# Patient Record
Sex: FEMALE | Race: ASIAN | NOT HISPANIC OR LATINO | Employment: OTHER | URBAN - NONMETROPOLITAN AREA
[De-identification: names, ages, dates, MRNs, and addresses within clinical notes are randomized per-mention and may not be internally consistent; named-entity substitution may affect disease eponyms.]

---

## 2017-03-07 ENCOUNTER — TRANSCRIBE ORDERS (OUTPATIENT)
Dept: INTERNAL MEDICINE | Facility: CLINIC | Age: 67
End: 2017-03-07

## 2017-03-07 DIAGNOSIS — Z12.31 VISIT FOR SCREENING MAMMOGRAM: Primary | ICD-10-CM

## 2017-04-05 ENCOUNTER — HOSPITAL ENCOUNTER (OUTPATIENT)
Dept: MAMMOGRAPHY | Facility: HOSPITAL | Age: 67
Discharge: HOME | End: 2017-04-05
Attending: INTERNAL MEDICINE | Admitting: INTERNAL MEDICINE

## 2017-04-05 DIAGNOSIS — Z12.31 VISIT FOR SCREENING MAMMOGRAM: ICD-10-CM

## 2017-04-05 PROCEDURE — 77063 BREAST TOMOSYNTHESIS BI: CPT | Performed by: RADIOLOGY

## 2017-04-05 PROCEDURE — G0202 SCR MAMMO BI INCL CAD: HCPCS

## 2017-04-05 PROCEDURE — 77063 BREAST TOMOSYNTHESIS BI: CPT

## 2017-04-05 PROCEDURE — G0202 SCR MAMMO BI INCL CAD: HCPCS | Performed by: RADIOLOGY

## 2017-05-16 DIAGNOSIS — G50.0 TRIGEMINAL NEURALGIA: ICD-10-CM

## 2017-05-16 RX ORDER — AMITRIPTYLINE HYDROCHLORIDE 10 MG/1
TABLET, FILM COATED ORAL
Qty: 135 TABLET | Refills: 3 | Status: CANCELLED | OUTPATIENT
Start: 2017-05-16

## 2017-05-17 DIAGNOSIS — G50.0 TRIGEMINAL NEURALGIA: ICD-10-CM

## 2017-05-17 RX ORDER — AMITRIPTYLINE HYDROCHLORIDE 10 MG/1
TABLET, FILM COATED ORAL
Qty: 90 TABLET | Refills: 3 | Status: SHIPPED | OUTPATIENT
Start: 2017-05-17 | End: 2017-05-18 | Stop reason: SDUPTHER

## 2017-05-17 RX ORDER — GABAPENTIN 100 MG/1
100 CAPSULE ORAL 2 TIMES DAILY PRN
Qty: 180 CAPSULE | Refills: 3 | Status: SHIPPED | OUTPATIENT
Start: 2017-05-17 | End: 2017-05-18 | Stop reason: SDUPTHER

## 2017-05-18 DIAGNOSIS — G50.0 TRIGEMINAL NEURALGIA: ICD-10-CM

## 2017-05-18 RX ORDER — GABAPENTIN 100 MG/1
100 CAPSULE ORAL 2 TIMES DAILY PRN
Qty: 180 CAPSULE | Refills: 3 | Status: SHIPPED | OUTPATIENT
Start: 2017-05-18 | End: 2017-07-07 | Stop reason: SDUPTHER

## 2017-05-18 RX ORDER — AMITRIPTYLINE HYDROCHLORIDE 10 MG/1
TABLET, FILM COATED ORAL
Qty: 90 TABLET | Refills: 3 | Status: SHIPPED | OUTPATIENT
Start: 2017-05-18 | End: 2017-11-30 | Stop reason: SDUPTHER

## 2017-06-07 DIAGNOSIS — E78.5 HYPERLIPIDEMIA, UNSPECIFIED HYPERLIPIDEMIA TYPE: Primary | ICD-10-CM

## 2017-06-14 ENCOUNTER — RESULTS ENCOUNTER (OUTPATIENT)
Dept: INTERNAL MEDICINE | Facility: CLINIC | Age: 67
End: 2017-06-14

## 2017-06-14 DIAGNOSIS — E78.5 HYPERLIPIDEMIA, UNSPECIFIED HYPERLIPIDEMIA TYPE: ICD-10-CM

## 2017-06-20 RX ORDER — DOXYCYCLINE HYCLATE 50 MG/1
324 CAPSULE, GELATIN COATED ORAL
Qty: 90 TABLET | Refills: 3 | Status: SHIPPED | OUTPATIENT
Start: 2017-06-20 | End: 2017-07-07

## 2017-07-07 ENCOUNTER — OFFICE VISIT (OUTPATIENT)
Dept: NEUROLOGY | Facility: CLINIC | Age: 67
End: 2017-07-07

## 2017-07-07 VITALS
BODY MASS INDEX: 21.37 KG/M2 | SYSTOLIC BLOOD PRESSURE: 148 MMHG | HEART RATE: 88 BPM | HEIGHT: 59 IN | WEIGHT: 106 LBS | OXYGEN SATURATION: 98 % | DIASTOLIC BLOOD PRESSURE: 76 MMHG

## 2017-07-07 DIAGNOSIS — G50.0 TRIGEMINAL NEURALGIA OF RIGHT SIDE OF FACE: Primary | ICD-10-CM

## 2017-07-07 PROCEDURE — 99213 OFFICE O/P EST LOW 20 MIN: CPT | Performed by: PSYCHIATRY & NEUROLOGY

## 2017-07-07 RX ORDER — GABAPENTIN 100 MG/1
100 CAPSULE ORAL 2 TIMES DAILY PRN
Qty: 180 CAPSULE | Refills: 3 | Status: SHIPPED | OUTPATIENT
Start: 2017-07-07 | End: 2017-07-28 | Stop reason: SDUPTHER

## 2017-07-07 RX ORDER — CARBAMAZEPINE 200 MG/1
100 TABLET ORAL 3 TIMES DAILY
Qty: 90 TABLET | Refills: 3 | Status: SHIPPED | OUTPATIENT
Start: 2017-07-07 | End: 2018-11-13 | Stop reason: SDUPTHER

## 2017-07-09 NOTE — PROGRESS NOTES
[facility] NEUROLOGY [place] PROGRESS NOTE  History of Present Illness     Date: 7/8/2017    Patient Identification  Antonio Barnes is a 66 y.o. female.    Patient information was obtained from patient and relative(s).  History/Exam limitations: none.    Original consultation requested by: Amarjit Beck MD      Chief Complaint   Trigeminal Neuralgia (Pt here for follow up, pt states she has been having a lot of pain )      History of Present Illness   Is a delightful 66-year-old lady who had been diagnosis of trigeminal neuralgia patient is status post gamma knife resection clinically improved 4.  Time and recently patient is having facial numbness associated with intermittent radiating pain along her maxillary and ophthalmic branch of trigeminal nerve.    PMH:   Past Medical History:   Diagnosis Date   • Anemia    • Breast cancer 1994    RIGHT   • Esophagitis, reflux    • High cholesterol    • History of colonoscopy 2010    (Fiberoptic)   • History of mammogram    • History of Papanicolaou smear of cervix     History of reported pap smear   • Osteoarthritis    • Peptic ulcer    • Trigeminal neuralgia    • Urethral stricture due to infection        Past Surgical History:   Past Surgical History:   Procedure Laterality Date   • APPENDECTOMY     • BREAST BIOPSY Right 1994   • COLONOSCOPY      Colonoscopy (Fiberoptic)   • MASTECTOMY Right 1994   • TONSILLECTOMY     • TONSILLECTOMY         Family Hisotry:   Family History   Problem Relation Age of Onset   • Hypertension Mother    • Kidney disease Mother    • Heart attack Father    • Breast cancer Maternal Aunt      AGE UNKNOWN   • Ovarian cancer Neg Hx        Social History:   Social History     Social History   • Marital status:      Spouse name: N/A   • Number of children: N/A   • Years of education: N/A     Occupational History   • Not on file.     Social History Main Topics   • Smoking status: Former Smoker     Packs/day: 1.00     Years: 20.00     Types:  Cigarettes     Quit date: 2007   • Smokeless tobacco: Never Used      Comment: Quit smoking July 2007   • Alcohol use No   • Drug use: No   • Sexual activity: No     Other Topics Concern   • Not on file     Social History Narrative       Medications:   Current Outpatient Prescriptions   Medication Sig Dispense Refill   • amitriptyline (ELAVIL) 10 MG tablet Take 1.5 tablet daily at dinner. 90 tablet 3   • amLODIPine (NORVASC) 10 MG tablet TAKE 1 TABLET DAILY AS     DIRECTED 90 tablet 3   • B Complex Vitamins (VITAMIN B COMPLEX) tablet Take 1 tablet by mouth daily.     • calcium-vitamin D (OSCAL-500) 500-200 MG-UNIT per tablet Take 1 tablet by mouth 2 (two) times a day.     • fenofibrate micronized (LOFIBRA) 134 MG capsule Take 1 capsule by mouth Every Other Day. 45 capsule 3   • gabapentin (NEURONTIN) 100 MG capsule Take 1 capsule by mouth 2 (Two) Times a Day As Needed (for facial pain). Take 1 capsule at lunch and 1 capsule at bedtime. 180 capsule 3   • Omega-3 Fatty Acids (FISH OIL PO) 1000 mg twice daily.     • omeprazole (PriLOSEC) 20 MG capsule TAKE 1 CAPSULE DAILY. 90 capsule 3   • pravastatin (PRAVACHOL) 80 MG tablet TAKE 1 TABLET DAILY 90 tablet 3   • sertraline (ZOLOFT) 50 MG tablet TAKE 1 TABLET EVERY DAY 90 tablet 3   • alendronate (FOSAMAX) 70 MG tablet Take 1 tablet by mouth 1 (one) time per week.     • carBAMazepine (TEGRETOL) 200 MG tablet Take 0.5 tablets by mouth 3 (Three) Times a Day. 90 tablet 3   • Cholecalciferol (VITAMIN D3) 1000 UNITS capsule Take 1 capsule by mouth daily.       No current facility-administered medications for this visit.        Allergy:   Allergies   Allergen Reactions   • Ace Inhibitors Cough   • Penicillins        Review of Systems:  Review of Systems   Constitutional: Negative for chills and fever.   HENT: Negative for congestion, ear pain, hearing loss, rhinorrhea and sore throat.    Eyes: Negative for pain, discharge and redness.   Respiratory: Negative for cough,  "shortness of breath, wheezing and stridor.    Cardiovascular: Negative for chest pain, palpitations and leg swelling.   Gastrointestinal: Negative for abdominal pain, constipation, nausea and vomiting.   Endocrine: Negative for cold intolerance, heat intolerance and polyphagia.   Genitourinary: Negative for dysuria, flank pain, frequency and urgency.   Musculoskeletal: Negative for joint swelling, myalgias, neck pain and neck stiffness.   Skin: Negative for pallor, rash and wound.   Allergic/Immunologic: Negative for environmental allergies.   Neurological: Positive for numbness. Negative for dizziness, tremors, seizures, syncope, facial asymmetry, speech difficulty, weakness, light-headedness and headaches.   Hematological: Negative for adenopathy.   Psychiatric/Behavioral: Positive for sleep disturbance. Negative for confusion and hallucinations. The patient is not nervous/anxious.        Physical Exam     Vitals:    07/07/17 1532   BP: 148/76   Pulse: 88   SpO2: 98%   Weight: 106 lb (48.1 kg)   Height: 59\" (149.9 cm)     GENERAL: Patient is pleasant, cooperative, appears to be stated age.  Body habitus is endomorphic.  SKIN AND EXTREMITIES:  No skin rashes or lesions are noted.  No cyanosis, clubbing or edema of the extremities.    HEAD:  Head is normocephalic and atraumatic.    NECK: Neck are non-tender without thyromegaly or adenopathy.  Carotic upstrokes are 1+/4.  No cranial or cervical bruits.  The neck is supple with a full range of motion.   ENT: palate elevate symmetrically, no evidence of high arch palate, tongue midline erythema in posterior pharynx, Mallampati Classification Class III   CARDIOVASCULAR:  Regular rate and rhythm with normal S1 and S2 without rub or gallop.  RESPIRATORY:  Clear to auscultation without wheezes or crackle   ABDOMEN:  Soft and non-tender, positive bowel sound without hepatosplenomegaly  BACK:  Back is straight without midline defect.    PSYCH:  Higher cortical " function/mental status:  The patient is alert.  She is oriented x3 to time, place and person.  Recent and the remote memory appear normal.  The patient has a good fund of knowledge.  There is no visual or auditory hallucination or suicidal or homicidal ideation.  SPEECH:There is no gross evidence of aphasia, dysarthria or agnosia.      CRANIAL NERVES:  Pupils are 4mm, equal round reactive to light, reacting briskly to 2mm without afferent pupillary defect.  Visual fields are intact to confrontation testing.  Fundoscopic examination reveals sharp disk margins with normal vasculature.  No papilledema, hemorrhages or exudates.  Extraocular movements are full and smooth with normal pursuits and saccades.  No nystagmus noted.  The face is symmetric. palate elevate symmetrically, Tongue midline, positive gag reflex. The remainder of the cranial nerves are intact and symmetrical.    MOTOR: Strength is 5/5 throughout with normal tone and bulk with the following exceptions, 4/5 intrinsic muscles of the hands and feet.  No involuntary movements noted.    Deep Tendon Reflexes: are 2/4 and symmetrical in the upper extremities, 2/4 and symmetrical at the knees and 1/4 and symmetrical at the Achilles tendon.  Plantar responses were down-going bilaterally.    SENSATION:  Intact to pinprick, light touch, vibration and proprioception.  Coordination:  The patient normally performs finger-nose-finger, heel-to-knee-to-shin and rapid alternating movements in symmetrical fashion.    COORDINATION AND GAIT:  The patient walks with a narrow-based gait.  Patient is able to heel-toe and tandem walk forward and backwards without difficulty.  Romberg and monopedal  Romberg are negative.    MUSCULOSKELETAL: Range of motion normal, no clubbing, cyanosis, or edema.  No joint swelling.              Records Reviewed: I have personally reviewed her previous medical record.    Antonio was seen today for trigeminal neuralgia.    Diagnoses and all  orders for this visit:    Trigeminal neuralgia of right side of face    Other orders  -     gabapentin (NEURONTIN) 100 MG capsule; Take 1 capsule by mouth 2 (Two) Times a Day As Needed (for facial pain). Take 1 capsule at lunch and 1 capsule at bedtime.  -     carBAMazepine (TEGRETOL) 200 MG tablet; Take 0.5 tablets by mouth 3 (Three) Times a Day.      Treatments:  1. I will start this patient on Tegretol 200 mg half a pill 3 times a day when necessary  2.  I have refilled her gabapentin  3.  And I'll schedule patient for follow-up visit    This Document is signed by Deangelo Rosas MD, FAAN, FAASM   July 8, 201710:18 PM

## 2017-08-03 ENCOUNTER — APPOINTMENT (OUTPATIENT)
Dept: LAB | Facility: HOSPITAL | Age: 67
End: 2017-08-03

## 2017-08-03 LAB
ALBUMIN SERPL-MCNC: 4.6 G/DL (ref 3.2–4.8)
ALBUMIN/GLOB SERPL: 1.4 G/DL (ref 1.5–2.5)
ALP SERPL-CCNC: 54 U/L (ref 25–100)
ALT SERPL W P-5'-P-CCNC: 24 U/L (ref 7–40)
ANION GAP SERPL CALCULATED.3IONS-SCNC: 10 MMOL/L (ref 3–11)
ARTICHOKE IGE QN: 147 MG/DL (ref 0–130)
AST SERPL-CCNC: 30 U/L (ref 0–33)
BILIRUB SERPL-MCNC: 0.3 MG/DL (ref 0.3–1.2)
BUN BLD-MCNC: 16 MG/DL (ref 9–23)
BUN/CREAT SERPL: 20 (ref 7–25)
CALCIUM SPEC-SCNC: 9.8 MG/DL (ref 8.7–10.4)
CHLORIDE SERPL-SCNC: 103 MMOL/L (ref 99–109)
CHOLEST SERPL-MCNC: 218 MG/DL (ref 0–200)
CK SERPL-CCNC: 130 U/L (ref 26–174)
CO2 SERPL-SCNC: 27 MMOL/L (ref 20–31)
CREAT BLD-MCNC: 0.8 MG/DL (ref 0.6–1.3)
GFR SERPL CREATININE-BSD FRML MDRD: 72 ML/MIN/1.73
GFR SERPL CREATININE-BSD FRML MDRD: 87 ML/MIN/1.73
GLOBULIN UR ELPH-MCNC: 3.3 GM/DL
GLUCOSE BLD-MCNC: 97 MG/DL (ref 70–100)
HDLC SERPL-MCNC: 65 MG/DL (ref 40–60)
POTASSIUM BLD-SCNC: 4.1 MMOL/L (ref 3.5–5.5)
PROT SERPL-MCNC: 7.9 G/DL (ref 5.7–8.2)
SODIUM BLD-SCNC: 140 MMOL/L (ref 132–146)
TRIGL SERPL-MCNC: 84 MG/DL (ref 0–150)

## 2017-08-03 PROCEDURE — 80061 LIPID PANEL: CPT | Performed by: INTERNAL MEDICINE

## 2017-08-03 PROCEDURE — 36415 COLL VENOUS BLD VENIPUNCTURE: CPT | Performed by: INTERNAL MEDICINE

## 2017-08-03 PROCEDURE — 82550 ASSAY OF CK (CPK): CPT | Performed by: INTERNAL MEDICINE

## 2017-08-03 PROCEDURE — 80053 COMPREHEN METABOLIC PANEL: CPT | Performed by: INTERNAL MEDICINE

## 2017-08-03 RX ORDER — GABAPENTIN 100 MG/1
CAPSULE ORAL
Qty: 180 CAPSULE | Refills: 1 | Status: SHIPPED | OUTPATIENT
Start: 2017-08-03 | End: 2017-08-22 | Stop reason: DRUGHIGH

## 2017-08-09 ENCOUNTER — OFFICE VISIT (OUTPATIENT)
Dept: INTERNAL MEDICINE | Facility: CLINIC | Age: 67
End: 2017-08-09

## 2017-08-09 VITALS
BODY MASS INDEX: 21.57 KG/M2 | TEMPERATURE: 99 F | RESPIRATION RATE: 14 BRPM | WEIGHT: 107 LBS | HEART RATE: 94 BPM | OXYGEN SATURATION: 98 % | DIASTOLIC BLOOD PRESSURE: 80 MMHG | HEIGHT: 59 IN | SYSTOLIC BLOOD PRESSURE: 152 MMHG

## 2017-08-09 DIAGNOSIS — K21.9 GASTROESOPHAGEAL REFLUX DISEASE WITHOUT ESOPHAGITIS: ICD-10-CM

## 2017-08-09 DIAGNOSIS — D64.9 ANEMIA, UNSPECIFIED TYPE: ICD-10-CM

## 2017-08-09 DIAGNOSIS — I10 ESSENTIAL HYPERTENSION: Primary | ICD-10-CM

## 2017-08-09 DIAGNOSIS — G50.0 TRIGEMINAL NEURALGIA: ICD-10-CM

## 2017-08-09 DIAGNOSIS — E55.9 VITAMIN D DEFICIENCY: ICD-10-CM

## 2017-08-09 DIAGNOSIS — F32.A DEPRESSION, UNSPECIFIED DEPRESSION TYPE: ICD-10-CM

## 2017-08-09 DIAGNOSIS — M77.10 LATERAL EPICONDYLITIS, UNSPECIFIED LATERALITY: ICD-10-CM

## 2017-08-09 PROCEDURE — 99214 OFFICE O/P EST MOD 30 MIN: CPT | Performed by: INTERNAL MEDICINE

## 2017-08-09 NOTE — PROGRESS NOTES
Subjective   Antonio Barnes is a 66 y.o. female.     Chief Complaint   Patient presents with   • Follow-up   • Jaw Pain   • Skin Problem     look at feet    • Labs Only       History of Present Illness   Patient here for follow-up.  Blood pressure elevated on medication.  Vision complains trigeminal neuralgia is coming back.  Patient is taking pain medicine by neurologist but still has severe pain.  Hyperlipidemia  patient is taking pravastatin 80 mg nighttime.  Also complains skin lesion in both ankle and feet no significant changing size or color.  GERD stable medication.  Knee joint pain stable now.  Anemia stable now.    Current Outpatient Prescriptions:   •  alendronate (FOSAMAX) 70 MG tablet, Take 1 tablet by mouth 1 (one) time per week., Disp: , Rfl:   •  amitriptyline (ELAVIL) 10 MG tablet, Take 1.5 tablet daily at dinner., Disp: 90 tablet, Rfl: 3  •  amLODIPine (NORVASC) 10 MG tablet, TAKE 1 TABLET DAILY AS     DIRECTED, Disp: 90 tablet, Rfl: 3  •  B Complex Vitamins (VITAMIN B COMPLEX) tablet, Take 1 tablet by mouth daily., Disp: , Rfl:   •  calcium-vitamin D (OSCAL-500) 500-200 MG-UNIT per tablet, Take 1 tablet by mouth 2 (two) times a day., Disp: , Rfl:   •  carBAMazepine (TEGRETOL) 200 MG tablet, Take 0.5 tablets by mouth 3 (Three) Times a Day., Disp: 90 tablet, Rfl: 3  •  Cholecalciferol (VITAMIN D3) 1000 UNITS capsule, Take 1 capsule by mouth daily., Disp: , Rfl:   •  fenofibrate micronized (LOFIBRA) 134 MG capsule, Take 1 capsule by mouth Every Other Day., Disp: 45 capsule, Rfl: 3  •  gabapentin (NEURONTIN) 100 MG capsule, TAKE 1 CAPSULE AT LUNCH AND 1 CAPSULE AT BEDTIME AS NEEDED FOR FACIAL PAIN, Disp: 180 capsule, Rfl: 1  •  Omega-3 Fatty Acids (FISH OIL PO), 1000 mg twice daily., Disp: , Rfl:   •  omeprazole (PriLOSEC) 20 MG capsule, TAKE 1 CAPSULE DAILY., Disp: 90 capsule, Rfl: 3  •  pravastatin (PRAVACHOL) 80 MG tablet, TAKE 1 TABLET DAILY, Disp: 90 tablet, Rfl: 3  •  sertraline (ZOLOFT)  50 MG tablet, TAKE 1 TABLET EVERY DAY, Disp: 90 tablet, Rfl: 3    The following portions of the patient's history were reviewed and updated as appropriate: allergies, current medications, past family history, past medical history, past social history, past surgical history and problem list.    Review of Systems   Constitutional: Negative.    Respiratory: Negative.    Cardiovascular: Negative.    Gastrointestinal: Negative.    Musculoskeletal: Negative.    Skin: Negative.    Neurological: Negative.         Trigeminal neuralgia   Psychiatric/Behavioral: Negative.        Objective   Physical Exam   Constitutional: She is oriented to person, place, and time. She appears well-nourished.   Neck: Neck supple.   Cardiovascular: Normal rate, regular rhythm and normal heart sounds.    Pulmonary/Chest: Effort normal and breath sounds normal.   Abdominal: Bowel sounds are normal.   Neurological: She is alert and oriented to person, place, and time.   Trigeminal neuralgia   Skin: Skin is warm.   Psychiatric: She has a normal mood and affect.       All tests have been reviewed.    Assessment/Plan   There are no diagnoses linked to this encounter.           constipation improved after metamucil   Low back pain improved. cotninue Flexeril and the Tylenol patient declines physical therapy  Neck pain improved XR mild arthritis Flexeril and Tylenol patient declines a physical therapy  Osteoporosis,improved after Fosamax 70 mg once a week. continue Fosamax. Os-Tate D 500 mg twice a day. Weightbearing exercise.   Vitamin D deficiency continue vitamin D supplements  Thalassemia trait continue to watch, follow with hematologist  anemia now repeat Hb 10.5, s/p colonoscopy, 3/2015. need EGD and SBFT , patient wants to discuss next  visit  Hyperlipidemia continue good diet and fenofibrate 134mg,   GERD continue medicine  Depression continue sertraline   HTN continue medicine, BP high ?pain related, control pain first  Left knee OA XR mini DJD  PT patient declined  history breast ca right mastectomy 1994  Trigeminal neuralgia follow up with neuro,   gyn done PA no more needed  Colon 3/2016, only divertic and hemorroid  [id number], prevnar 13 done, zostavax done, Tdap done, pneumovax done  SK watch for color and size changes  PE done with octavio  follow up 4 mo

## 2017-08-17 ENCOUNTER — TELEPHONE (OUTPATIENT)
Dept: NEUROLOGY | Facility: CLINIC | Age: 67
End: 2017-08-17

## 2017-08-17 NOTE — TELEPHONE ENCOUNTER
Patient's daughter ( Jocelin) called and states that the patient's right side of her face isn't getting any better. She is in so much pain that she is crying and can't function. They would like to possibly talk about an increase in the medicine Carbamazepine. States that the patient's legs and feet are doing okay.

## 2017-08-22 NOTE — TELEPHONE ENCOUNTER
Pt's daughter called again about her mother's facial pain. They would like to see about increasing her Gabapentin.     I spoke with Dr. Rosas he has approved and increase. Pt may take Gabapentin 300mg, one BID. #60 with 2 RF called in to Christy @ [facility].

## 2017-09-15 ENCOUNTER — OFFICE VISIT (OUTPATIENT)
Dept: NEUROLOGY | Facility: CLINIC | Age: 67
End: 2017-09-15

## 2017-09-15 VITALS
DIASTOLIC BLOOD PRESSURE: 78 MMHG | BODY MASS INDEX: 21.57 KG/M2 | WEIGHT: 107 LBS | HEIGHT: 59 IN | SYSTOLIC BLOOD PRESSURE: 152 MMHG | HEART RATE: 89 BPM | OXYGEN SATURATION: 98 %

## 2017-09-15 DIAGNOSIS — G50.0 TRIGEMINAL NEURALGIA: Primary | ICD-10-CM

## 2017-09-15 PROCEDURE — 99213 OFFICE O/P EST LOW 20 MIN: CPT | Performed by: PSYCHIATRY & NEUROLOGY

## 2017-09-15 RX ORDER — GABAPENTIN 300 MG/1
CAPSULE ORAL
COMMUNITY
Start: 2017-08-22 | End: 2018-11-13 | Stop reason: SDUPTHER

## 2017-09-15 NOTE — PROGRESS NOTES
[facility] NEUROLOGY [place] PROGRESS NOTE  History of Present Illness     Date: 9/15/2017    Patient Identification  Antonio Barnes is a 66 y.o. female.    Patient information was obtained from patient and relative(s).  History/Exam limitations: none.    Original consultation requested by: Amarjit Beck MD      Chief Complaint   Pain (Pt in office with c/o pain in head )      History of Present Illness   Patient is a pleasant 66-year-old referred to [facility] neurology [place] for evaluation of trigeminal neuralgia.  Interval history since last visit patient has an acute is has sedation of trigeminal neuralgia require Tegretol 100 mg 3 times a day to abort her persistent trigeminal neuralgia attacks.  Patient also increase her Neurontin from 100 mg to 300 mg 3 times a day during the attacks.      In today visit patient reported that her trigeminal neuralgia is under excellent control.  She has reduced her Neurontin back to 100 mg 3 times a day.  She has also reduce her Tegretol use to when necessary basis.    PMH:   Past Medical History:   Diagnosis Date   • Anemia    • Breast cancer 1994    RIGHT   • Esophagitis, reflux    • High cholesterol    • History of colonoscopy 2010    (Fiberoptic)   • History of mammogram    • History of Papanicolaou smear of cervix     History of reported pap smear   • Osteoarthritis    • Peptic ulcer    • Trigeminal neuralgia    • Urethral stricture due to infection        Past Surgical History:   Past Surgical History:   Procedure Laterality Date   • APPENDECTOMY     • BREAST BIOPSY Right 1994   • COLONOSCOPY      Colonoscopy (Fiberoptic)   • MASTECTOMY Right 1994   • TONSILLECTOMY     • TONSILLECTOMY         Family Hisotry:   Family History   Problem Relation Age of Onset   • Hypertension Mother    • Kidney disease Mother    • Heart attack Father    • Breast cancer Maternal Aunt      AGE UNKNOWN   • Ovarian cancer Neg Hx        Social History:   Social History     Social  History   • Marital status:      Spouse name: N/A   • Number of children: N/A   • Years of education: N/A     Occupational History   • Not on file.     Social History Main Topics   • Smoking status: Former Smoker     Packs/day: 1.00     Years: 20.00     Types: Cigarettes     Quit date: 2007   • Smokeless tobacco: Never Used      Comment: Quit smoking July 2007   • Alcohol use No   • Drug use: No   • Sexual activity: No     Other Topics Concern   • Not on file     Social History Narrative       Medications:   Current Outpatient Prescriptions   Medication Sig Dispense Refill   • amitriptyline (ELAVIL) 10 MG tablet Take 1.5 tablet daily at dinner. 90 tablet 3   • amLODIPine (NORVASC) 10 MG tablet TAKE 1 TABLET DAILY AS     DIRECTED 90 tablet 3   • B Complex Vitamins (VITAMIN B COMPLEX) tablet Take 1 tablet by mouth daily.     • calcium-vitamin D (OSCAL-500) 500-200 MG-UNIT per tablet Take 1 tablet by mouth 2 (two) times a day.     • carBAMazepine (TEGRETOL) 200 MG tablet Take 0.5 tablets by mouth 3 (Three) Times a Day. 90 tablet 3   • Cholecalciferol (VITAMIN D3) 1000 UNITS capsule Take 1 capsule by mouth daily.     • fenofibrate micronized (LOFIBRA) 134 MG capsule Take 1 capsule by mouth Every Other Day. 45 capsule 3   • gabapentin (NEURONTIN) 300 MG capsule      • Omega-3 Fatty Acids (FISH OIL PO) 1000 mg twice daily.     • omeprazole (PriLOSEC) 20 MG capsule TAKE 1 CAPSULE DAILY. 90 capsule 3   • pravastatin (PRAVACHOL) 80 MG tablet TAKE 1 TABLET DAILY 90 tablet 3   • sertraline (ZOLOFT) 50 MG tablet TAKE 1 TABLET EVERY DAY 90 tablet 3   • alendronate (FOSAMAX) 70 MG tablet Take 1 tablet by mouth 1 (one) time per week.       No current facility-administered medications for this visit.        Allergy:   Allergies   Allergen Reactions   • Ace Inhibitors Cough   • Penicillins        Review of Systems:  Review of Systems   Constitutional: Negative for chills and fever.   HENT: Negative for congestion, ear pain,  "hearing loss, rhinorrhea and sore throat.    Eyes: Negative for pain, discharge and redness.   Respiratory: Negative for cough, shortness of breath, wheezing and stridor.    Cardiovascular: Negative for chest pain, palpitations and leg swelling.   Gastrointestinal: Negative for abdominal pain, constipation, nausea and vomiting.   Endocrine: Negative for cold intolerance, heat intolerance and polyphagia.   Genitourinary: Negative for dysuria, flank pain, frequency and urgency.   Musculoskeletal: Negative for joint swelling, myalgias, neck pain and neck stiffness.   Skin: Negative for pallor, rash and wound.   Allergic/Immunologic: Negative for environmental allergies.   Neurological: Positive for headaches. Negative for dizziness, tremors, seizures, syncope, facial asymmetry, speech difficulty, weakness, light-headedness and numbness.   Hematological: Negative for adenopathy.   Psychiatric/Behavioral: Negative for confusion and hallucinations. The patient is not nervous/anxious.        Physical Exam     Vitals:    09/15/17 1627   BP: 152/78   Pulse: 89   SpO2: 98%   Weight: 107 lb (48.5 kg)   Height: 59\" (149.9 cm)     GENERAL: Patient is pleasant, cooperative, appears to be stated age.  Body habitus is endomorphic.  SKIN AND EXTREMITIES:  No skin rashes or lesions are noted.  No cyanosis, clubbing or edema of the extremities.    HEAD:  Head is normocephalic and atraumatic.    NECK: Neck are non-tender without thyromegaly or adenopathy.  Carotic upstrokes are 1+/4.  No cranial or cervical bruits.  The neck is supple with a full range of motion.   ENT: palate elevate symmetrically, no evidence of high arch palate, tongue midline erythema in posterior pharynx, Mallampati Classification Class III   CARDIOVASCULAR:  Regular rate and rhythm with normal S1 and S2 without rub or gallop.  RESPIRATORY:  Clear to auscultation without wheezes or crackle   ABDOMEN:  Soft and non-tender, positive bowel sound without " hepatosplenomegaly  BACK:  Back is straight without midline defect.    PSYCH:  Higher cortical function/mental status:  The patient is alert.  She is oriented x3 to time, place and person.  Recent and the remote memory appear normal.  The patient has a good fund of knowledge.  There is no visual or auditory hallucination or suicidal or homicidal ideation.  SPEECH:There is no gross evidence of aphasia, dysarthria or agnosia.      CRANIAL NERVES:  Pupils are 4mm, equal round reactive to light, reacting briskly to 2mm without afferent pupillary defect.  Visual fields are intact to confrontation testing.  Fundoscopic examination reveals sharp disk margins with normal vasculature.  No papilledema, hemorrhages or exudates.  Extraocular movements are full and smooth with normal pursuits and saccades.  No nystagmus noted.  The face is symmetric. palate elevate symmetrically, Tongue midline, positive gag reflex. The remainder of the cranial nerves are intact and symmetrical.    MOTOR: Strength is 5/5 throughout with normal tone and bulk with the following exceptions, 4/5 intrinsic muscles of the hands and feet.  No involuntary movements noted.    Deep Tendon Reflexes: are 2/4 and symmetrical in the upper extremities, 2/4 and symmetrical at the knees and 1/4 and symmetrical at the Achilles tendon.  Plantar responses were down-going bilaterally.    SENSATION:  Intact to pinprick, light touch, vibration and proprioception.  Coordination:  The patient normally performs finger-nose-finger, heel-to-knee-to-shin and rapid alternating movements in symmetrical fashion.    COORDINATION AND GAIT:  The patient walks with a narrow-based gait.  Patient is able to heel-toe and tandem walk forward and backwards without difficulty.  Romberg and monopedal  Romberg are negative.    MUSCULOSKELETAL: Range of motion normal, no clubbing, cyanosis, or edema.  No joint swelling.            Studies: I have personally reviewed the following and  discussed with the patient.  Results for orders placed or performed in visit on 06/14/17   CK   Result Value Ref Range    Creatine Kinase 130 26 - 174 U/L   Comprehensive Metabolic Panel   Result Value Ref Range    Glucose 97 70 - 100 mg/dL    BUN 16 9 - 23 mg/dL    Creatinine 0.80 0.60 - 1.30 mg/dL    Sodium 140 132 - 146 mmol/L    Potassium 4.1 3.5 - 5.5 mmol/L    Chloride 103 99 - 109 mmol/L    CO2 27.0 20.0 - 31.0 mmol/L    Calcium 9.8 8.7 - 10.4 mg/dL    Total Protein 7.9 5.7 - 8.2 g/dL    Albumin 4.60 3.20 - 4.80 g/dL    ALT (SGPT) 24 7 - 40 U/L    AST (SGOT) 30 0 - 33 U/L    Alkaline Phosphatase 54 25 - 100 U/L    Total Bilirubin 0.3 0.3 - 1.2 mg/dL    eGFR Non African Amer 72 >60 mL/min/1.73    eGFR  African Amer 87 >60 mL/min/1.73    Globulin 3.3 gm/dL    A/G Ratio 1.4 (L) 1.5 - 2.5 g/dL    BUN/Creatinine Ratio 20.0 7.0 - 25.0    Anion Gap 10.0 3.0 - 11.0 mmol/L   Lipid Panel   Result Value Ref Range    Total Cholesterol 218 (H) 0 - 200 mg/dL    Triglycerides 84 0 - 150 mg/dL    HDL Cholesterol 65 (H) 40 - 60 mg/dL    LDL Cholesterol  147 (H) 0 - 130 mg/dL     Records Reviewed: I have personally reviewed her previous medical record.    Antonio was seen today for pain.    Diagnoses and all orders for this visit:    Trigeminal neuralgia      Treatments:  1.  Patient has an acute exacerbation of trigeminal neuralgia but fortunately aborted by Tegretol and Neurontin.  2.  Since patient has improved clinically she has reduced her Tegretol to when necessary and basis and reducing Neurontin from 300 mg back to 100 mg 3 times a day  3.  I'll schedule patient to follow visit in 3 months    This Document is signed by Deangelo Rosas MD, FAAN, FAASM   September 15, 84036:55 PM

## 2017-09-22 RX ORDER — OMEPRAZOLE 20 MG/1
CAPSULE, DELAYED RELEASE ORAL
Qty: 90 CAPSULE | Refills: 3 | Status: SHIPPED | OUTPATIENT
Start: 2017-09-22 | End: 2018-08-22 | Stop reason: SDUPTHER

## 2017-09-22 RX ORDER — AMLODIPINE BESYLATE 10 MG/1
TABLET ORAL
Qty: 90 TABLET | Refills: 3 | Status: SHIPPED | OUTPATIENT
Start: 2017-09-22 | End: 2018-08-22 | Stop reason: SDUPTHER

## 2017-11-29 ENCOUNTER — PATIENT MESSAGE (OUTPATIENT)
Dept: NEUROLOGY | Facility: CLINIC | Age: 67
End: 2017-11-29

## 2017-11-29 DIAGNOSIS — G50.0 TRIGEMINAL NEURALGIA: ICD-10-CM

## 2017-11-29 RX ORDER — AMITRIPTYLINE HYDROCHLORIDE 10 MG/1
TABLET, FILM COATED ORAL
Qty: 135 TABLET | Refills: 3 | Status: CANCELLED | OUTPATIENT
Start: 2017-11-29

## 2017-11-29 RX ORDER — PRAVASTATIN SODIUM 80 MG/1
TABLET ORAL
Qty: 90 TABLET | Refills: 3 | Status: SHIPPED | OUTPATIENT
Start: 2017-11-29 | End: 2018-04-03

## 2017-11-30 RX ORDER — AMITRIPTYLINE HYDROCHLORIDE 10 MG/1
TABLET, FILM COATED ORAL
Qty: 90 TABLET | Refills: 3 | Status: SHIPPED | OUTPATIENT
Start: 2017-11-30 | End: 2018-09-10 | Stop reason: SDUPTHER

## 2017-11-30 NOTE — TELEPHONE ENCOUNTER
From: Antonio Barnes  To: Deangelo Rosas MD, FAAN  Sent: 11/29/2017 4:50 PM EST  Subject: Prescription Question    Nemesio Santana, would you please call [employer] for the refill of Amitrityline 10 mg.  Thank you,  Bacilio

## 2018-01-04 RX ORDER — FENOFIBRATE 134 MG/1
134 CAPSULE ORAL EVERY OTHER DAY
Qty: 45 CAPSULE | Refills: 3 | Status: SHIPPED | OUTPATIENT
Start: 2018-01-04 | End: 2019-01-11

## 2018-01-08 ENCOUNTER — TELEPHONE (OUTPATIENT)
Dept: INTERNAL MEDICINE | Facility: CLINIC | Age: 68
End: 2018-01-08

## 2018-01-09 ENCOUNTER — TELEPHONE (OUTPATIENT)
Dept: INTERNAL MEDICINE | Facility: CLINIC | Age: 68
End: 2018-01-09

## 2018-01-09 DIAGNOSIS — E55.9 VITAMIN D DEFICIENCY: Primary | ICD-10-CM

## 2018-01-09 DIAGNOSIS — D64.9 ANEMIA, UNSPECIFIED TYPE: ICD-10-CM

## 2018-01-09 DIAGNOSIS — I10 ESSENTIAL HYPERTENSION: ICD-10-CM

## 2018-01-09 DIAGNOSIS — E78.5 HYPERLIPIDEMIA, UNSPECIFIED HYPERLIPIDEMIA TYPE: ICD-10-CM

## 2018-01-09 NOTE — TELEPHONE ENCOUNTER
LEFT DETAILED MESSAGE ON PATIENT'S VOICEMAIL REGARDING LAB ORDERS AND CHEST XRAY. ASK PATIENT TO CALL BACK IF SHE HAS ANY QUESTIONS.

## 2018-01-09 NOTE — TELEPHONE ENCOUNTER
Pt requests labs be put in prior to apt. Also she said it had been over a year since her last chest X-ray.

## 2018-01-10 ENCOUNTER — OFFICE VISIT (OUTPATIENT)
Dept: NEUROLOGY | Facility: CLINIC | Age: 68
End: 2018-01-10

## 2018-01-10 VITALS
HEART RATE: 93 BPM | WEIGHT: 107 LBS | BODY MASS INDEX: 21.57 KG/M2 | DIASTOLIC BLOOD PRESSURE: 78 MMHG | HEIGHT: 59 IN | OXYGEN SATURATION: 98 % | SYSTOLIC BLOOD PRESSURE: 150 MMHG

## 2018-01-10 DIAGNOSIS — G50.0 TRIGEMINAL NEURALGIA OF RIGHT SIDE OF FACE: Primary | ICD-10-CM

## 2018-01-10 PROCEDURE — 99213 OFFICE O/P EST LOW 20 MIN: CPT | Performed by: PSYCHIATRY & NEUROLOGY

## 2018-01-10 RX ORDER — GABAPENTIN 100 MG/1
1 CAPSULE ORAL 2 TIMES DAILY
COMMUNITY
Start: 2017-12-08 | End: 2018-03-01 | Stop reason: SDUPTHER

## 2018-01-11 NOTE — PROGRESS NOTES
[facility] NEUROLOGY [place] PROGRESS NOTE  History of Present Illness     Date: 1/10/2018    Patient Identification  Antonio Barnes is a 67 y.o. female.    Patient information was obtained from patient.  History/Exam limitations: none.    Original consultation requested by: Amarjit Beck MD      Chief Complaint   Trigeminal Neuralgia (Pt in office today for 3 month follow up )      History of Present Illness   Chin is a delightful 67-year-old lady referred to [facility] neurology [place] for evaluation of trigeminal neuralgia.  Patient has been much improved since last visit patient is currently taking Neurontin 100 mg one by mouth twice a day only on the location when the trigeminal neuralgia act up she would use to 300 mg intermittently.  Patient is still taking Tegretol 200 mg 3 times a day.  She is very pleased to see the improvement    PMH:   Past Medical History:   Diagnosis Date   • Anemia    • Breast cancer 1994    RIGHT   • Esophagitis, reflux    • High cholesterol    • History of colonoscopy 2010    (Fiberoptic)   • History of mammogram    • History of Papanicolaou smear of cervix     History of reported pap smear   • Osteoarthritis    • Peptic ulcer    • Trigeminal neuralgia    • Urethral stricture due to infection        Past Surgical History:   Past Surgical History:   Procedure Laterality Date   • APPENDECTOMY     • BREAST BIOPSY Right 1994   • COLONOSCOPY      Colonoscopy (Fiberoptic)   • MASTECTOMY Right 1994   • TONSILLECTOMY     • TONSILLECTOMY         Family Hisotry:   Family History   Problem Relation Age of Onset   • Hypertension Mother    • Kidney disease Mother    • Heart attack Father    • Breast cancer Maternal Aunt      AGE UNKNOWN   • Ovarian cancer Neg Hx        Social History:   Social History     Social History   • Marital status:      Spouse name: N/A   • Number of children: N/A   • Years of education: N/A     Occupational History   • Not on file.     Social History  Main Topics   • Smoking status: Former Smoker     Packs/day: 1.00     Years: 20.00     Types: Cigarettes     Quit date: 2007   • Smokeless tobacco: Never Used      Comment: Quit smoking July 2007   • Alcohol use No   • Drug use: No   • Sexual activity: No     Other Topics Concern   • Not on file     Social History Narrative       Medications:   Current Outpatient Prescriptions   Medication Sig Dispense Refill   • alendronate (FOSAMAX) 70 MG tablet Take 1 tablet by mouth 1 (one) time per week.     • amitriptyline (ELAVIL) 10 MG tablet Take 1.5 tablet daily at dinner. 90 tablet 3   • amLODIPine (NORVASC) 10 MG tablet TAKE 1 TABLET DAILY AS DIRECTED 90 tablet 3   • B Complex Vitamins (VITAMIN B COMPLEX) tablet Take 1 tablet by mouth daily.     • calcium-vitamin D (OSCAL-500) 500-200 MG-UNIT per tablet Take 1 tablet by mouth 2 (two) times a day.     • carBAMazepine (TEGRETOL) 200 MG tablet Take 0.5 tablets by mouth 3 (Three) Times a Day. 90 tablet 3   • Cholecalciferol (VITAMIN D3) 1000 UNITS capsule Take 1 capsule by mouth daily.     • fenofibrate micronized (LOFIBRA) 134 MG capsule Take 1 capsule by mouth Every Other Day. 45 capsule 3   • gabapentin (NEURONTIN) 100 MG capsule 1 capsule 2 (Two) Times a Day.     • gabapentin (NEURONTIN) 300 MG capsule      • Omega-3 Fatty Acids (FISH OIL PO) 1000 mg twice daily.     • omeprazole (priLOSEC) 20 MG capsule TAKE 1 CAPSULE DAILY. 90 capsule 3   • pravastatin (PRAVACHOL) 80 MG tablet TAKE 1 TABLET EVERY DAY 90 tablet 3   • sertraline (ZOLOFT) 50 MG tablet TAKE 1 TABLET EVERY DAY 90 tablet 3     No current facility-administered medications for this visit.        Allergy:   Allergies   Allergen Reactions   • Ace Inhibitors Cough   • Penicillins        Review of Systems:  Review of Systems   Constitutional: Negative for chills and fever.   HENT: Negative for congestion, ear pain, hearing loss, rhinorrhea and sore throat.         Facial pain   Eyes: Negative for pain, discharge  "and redness.   Respiratory: Negative for cough, shortness of breath, wheezing and stridor.    Cardiovascular: Negative for chest pain, palpitations and leg swelling.   Gastrointestinal: Negative for abdominal pain, constipation, nausea and vomiting.   Endocrine: Negative for cold intolerance, heat intolerance and polyphagia.   Genitourinary: Negative for dysuria, flank pain, frequency and urgency.   Musculoskeletal: Negative for joint swelling, myalgias, neck pain and neck stiffness.   Skin: Negative for pallor, rash and wound.   Allergic/Immunologic: Negative for environmental allergies.   Neurological: Positive for numbness. Negative for dizziness, tremors, seizures, syncope, facial asymmetry, speech difficulty, weakness, light-headedness and headaches.   Hematological: Negative for adenopathy.   Psychiatric/Behavioral: Positive for sleep disturbance. Negative for confusion and hallucinations. The patient is not nervous/anxious.        Physical Exam     Vitals:    01/10/18 1550   BP: 150/78   Pulse: 93   SpO2: 98%   Weight: 48.5 kg (107 lb)   Height: 149.9 cm (59\")     GENERAL: Patient is pleasant, cooperative, appears to be stated age.  Body habitus is endomorphic.  SKIN AND EXTREMITIES:  No skin rashes or lesions are noted.  No cyanosis, clubbing or edema of the extremities.    HEAD:  Head is normocephalic and atraumatic.    NECK: Neck are non-tender without thyromegaly or adenopathy.  Carotic upstrokes are 1+/4.  No cranial or cervical bruits.  The neck is supple with a full range of motion.   ENT: palate elevate symmetrically, no evidence of high arch palate, tongue midline erythema in posterior pharynx, Mallampati Classification Class III   CARDIOVASCULAR:  Regular rate and rhythm with normal S1 and S2 without rub or gallop.  RESPIRATORY:  Clear to auscultation without wheezes or crackle   ABDOMEN:  Soft and non-tender, positive bowel sound without hepatosplenomegaly  BACK:  Back is straight without midline " defect.    PSYCH:  Higher cortical function/mental status:  The patient is alert.  She is oriented x3 to time, place and person.  Recent and the remote memory appear normal.  The patient has a good fund of knowledge.  There is no visual or auditory hallucination or suicidal or homicidal ideation.  SPEECH:There is no gross evidence of aphasia, dysarthria or agnosia.      CRANIAL NERVES:  Pupils are 4mm, equal round reactive to light, reacting briskly to 2mm without afferent pupillary defect.  Visual fields are intact to confrontation testing.  Fundoscopic examination reveals sharp disk margins with normal vasculature.  No papilledema, hemorrhages or exudates.  Extraocular movements are full and smooth with normal pursuits and saccades.  No nystagmus noted.  The face is symmetric. palate elevate symmetrically, Tongue midline, positive gag reflex. The remainder of the cranial nerves are intact and symmetrical.    MOTOR: Strength is 5/5 throughout with normal tone and bulk with the following exceptions, 4/5 intrinsic muscles of the hands and feet.  No involuntary movements noted.    Deep Tendon Reflexes: are 2/4 and symmetrical in the upper extremities, 2/4 and symmetrical at the knees and 1/4 and symmetrical at the Achilles tendon.  Plantar responses were down-going bilaterally.    SENSATION:  Intact to pinprick, light touch, vibration and proprioception.  Coordination:  The patient normally performs finger-nose-finger, heel-to-knee-to-shin and rapid alternating movements in symmetrical fashion.    COORDINATION AND GAIT:  The patient walks with a narrow-based gait.  Patient is able to heel-toe and tandem walk forward and backwards without difficulty.  Romberg and monopedal  Romberg are negative.    MUSCULOSKELETAL: Range of motion normal, no clubbing, cyanosis, or edema.  No joint swelling.            Studies: I have personally reviewed the following and discussed with the patient.  Results for orders placed or  performed in visit on 06/14/17   CK   Result Value Ref Range    Creatine Kinase 130 26 - 174 U/L   Comprehensive Metabolic Panel   Result Value Ref Range    Glucose 97 70 - 100 mg/dL    BUN 16 9 - 23 mg/dL    Creatinine 0.80 0.60 - 1.30 mg/dL    Sodium 140 132 - 146 mmol/L    Potassium 4.1 3.5 - 5.5 mmol/L    Chloride 103 99 - 109 mmol/L    CO2 27.0 20.0 - 31.0 mmol/L    Calcium 9.8 8.7 - 10.4 mg/dL    Total Protein 7.9 5.7 - 8.2 g/dL    Albumin 4.60 3.20 - 4.80 g/dL    ALT (SGPT) 24 7 - 40 U/L    AST (SGOT) 30 0 - 33 U/L    Alkaline Phosphatase 54 25 - 100 U/L    Total Bilirubin 0.3 0.3 - 1.2 mg/dL    eGFR Non African Amer 72 >60 mL/min/1.73    eGFR  African Amer 87 >60 mL/min/1.73    Globulin 3.3 gm/dL    A/G Ratio 1.4 (L) 1.5 - 2.5 g/dL    BUN/Creatinine Ratio 20.0 7.0 - 25.0    Anion Gap 10.0 3.0 - 11.0 mmol/L   Lipid Panel   Result Value Ref Range    Total Cholesterol 218 (H) 0 - 200 mg/dL    Triglycerides 84 0 - 150 mg/dL    HDL Cholesterol 65 (H) 40 - 60 mg/dL    LDL Cholesterol  147 (H) 0 - 130 mg/dL       Records Reviewed: I have personally reviewed her previous medical record.    Antonio was seen today for trigeminal neuralgia.    Diagnoses and all orders for this visit:    Trigeminal neuralgia of right side of face      Treatments:  1.  Clinically improved on current regimen  2.  Counseled patient extensively on Trigemnal Neuralgia as follow  The pain typically involves the lower face and jaw, although sometimes it affects the area around the nose and above the eye. This intense, stabbing, electric shock-like pain is caused by irritation of the trigeminal nerve, which sends branches to the forehead, cheek and lower jaw. It usually is limited to one side of the face.   The pain associated with trigeminal neuralgia represents an irritation of the nerve. The cause of the pain usually is due to contact between a healthy artery or vein and the trigeminal nerve at the base of the brain. This places pressure on  "the nerve as it enters the brain and causes the nerve to misfire.  Other causes of trigeminal neuralgia include pressure of a tumor on the nerve or multiple sclerosis, which damages the myelin sheaths. Development of trigeminal neuralgia in a young adult suggests the possibility of multiple sclerosis.  Symptoms   The pain is intensely sharp, throbbing and shock-like, and usually triggered by touching an area of the skin or by specific activities. Atypical pain often is present as a constant, burning sensation affecting a more widespread area of the face. With atypical trigeminal neuralgia, there may not be a remission period, and symptoms are usually more difficult to treat.  Trigeminal neuralgia tends to run in cycles. Patients often suffer long stretches of frequent attacks followed by weeks, months or even years of little or no pain. The usual pattern, however, is for the attacks to intensify over time with shorter pain-free periods. Some patients suffer less than one attack a day, while others experience a dozen or more every hour. The pain typically begins with a sensation of electrical shocks that culminates in an excruciating stabbing pain within less than 20 seconds. Attacks of trigeminal neuralgia may be triggered by the following:  · Touching the skin lightly  · Washing  · Shaving  · Brushing teeth  · Blowing the nose  · Drinking hot or cold beverages  · Encountering a light breeze  · Applying makeup  · Smiling  · Talking  The symptoms of several pain disorders are similar to those of trigeminal neuralgia. Temporal tendinitis involves cheek pain and tooth sensitivity, as well as headaches and neck and shoulder pain. This condition is called a \"migraine mimic\" because its symptoms are similar to those of a migraine. Petey syndrome is an injury of the styomandubular ligament, which connects the base of the skull with the lower jaw, producing pain in areas of the face, head and neck. Occipital neuralgia " involves pain in the front and back of the head that sometimes extends into the facial region.  Treatment   There are several effective ways to alleviate the pain, including a variety of medications.  · Carbamazepine , Baclofen  Phenytoin , Oxcarbazepine , Other medications include gabapentin , clonazepam , sodium valporate , lamotrigine  and topiramate.  There are drawbacks to these medications other than side effects.   Surgery   If medications have proven ineffective in treating trigeminal neuralgia, there are several surgical procedures that may help control the pain.   Stereotactic radiosurgery (through such procedures as Gamma Knife , Cyberknife , LINAC ) delivers a single highly concentrated dose of ionizing radiation to a small, precise target at the trigeminal nerve root. This treatment is noninvasive, and avoids many of the risks and complications of open surgery and other treatments. Over a period of time and as a result of radiation exposure, the slow formation of a lesion in the nerve interrupts transmission of pain signals to the brain.  Microvascular decompression    Percutaneous stereotactic rhizotomy    Percutaneous glycerol rhizotomy   Percutaneous balloon compression .  Motor cortex stimulation    The benefits of surgery should always be weighed carefully against its risks. Although a large percentage of trigeminal neuralgia patients report pain relief after surgery, there is no guarantee that surgery will help every individual.      This Document is signed by Deangelo Rosas MD, FAAN, FAASM   January 10, 42028:24 PM

## 2018-01-31 ENCOUNTER — APPOINTMENT (OUTPATIENT)
Dept: LAB | Facility: HOSPITAL | Age: 68
End: 2018-01-31

## 2018-02-06 LAB
25(OH)D3+25(OH)D2 SERPL-MCNC: 43.8 NG/ML
ALBUMIN SERPL-MCNC: 5 G/DL (ref 3.5–5)
ALBUMIN/GLOB SERPL: 1.5 G/DL (ref 1–2)
ALP SERPL-CCNC: 64 U/L (ref 38–126)
ALT SERPL-CCNC: 37 U/L (ref 13–69)
AST SERPL-CCNC: 34 U/L (ref 15–46)
BASOPHILS # BLD MANUAL: 0.08 10*3/MM3 (ref 0–0.2)
BASOPHILS NFR BLD MANUAL: 1 % (ref 0–2.5)
BILIRUB SERPL-MCNC: 0.4 MG/DL (ref 0.2–1.3)
BUN SERPL-MCNC: 12 MG/DL (ref 7–20)
BUN/CREAT SERPL: 20 (ref 7.1–23.5)
CALCIUM SERPL-MCNC: 10.5 MG/DL (ref 8.4–10.2)
CHLORIDE SERPL-SCNC: 102 MMOL/L (ref 98–107)
CHOLEST SERPL-MCNC: 231 MG/DL (ref 0–199)
CK SERPL-CCNC: 90 U/L (ref 30–170)
CO2 SERPL-SCNC: 28 MMOL/L (ref 26–30)
CREAT SERPL-MCNC: 0.6 MG/DL (ref 0.6–1.3)
DIFFERENTIAL COMMENT: NORMAL
EOSINOPHIL # BLD MANUAL: 0.08 10*3/MM3 (ref 0–0.7)
EOSINOPHIL NFR BLD MANUAL: 1 % (ref 0–7)
ERYTHROCYTE [DISTWIDTH] IN BLOOD BY AUTOMATED COUNT: 16.1 % (ref 11.5–14.5)
GFR SERPLBLD CREATININE-BSD FMLA CKD-EPI: 120 ML/MIN/1.73
GFR SERPLBLD CREATININE-BSD FMLA CKD-EPI: 99 ML/MIN/1.73
GLOBULIN SER CALC-MCNC: 3.4 GM/DL
GLUCOSE SERPL-MCNC: 100 MG/DL (ref 74–98)
HCT VFR BLD AUTO: 37.9 % (ref 37–47)
HDLC SERPL-MCNC: 72 MG/DL (ref 40–60)
HGB BLD-MCNC: 11.7 G/DL (ref 12–16)
LDLC SERPL CALC-MCNC: 136 MG/DL (ref 0–99)
LYMPHOCYTES # BLD MANUAL: 3.36 10*3/MM3 (ref 0.6–3.4)
LYMPHOCYTES NFR BLD MANUAL: 40 % (ref 10–50)
MCH RBC QN AUTO: 21.3 PG (ref 27–31)
MCHC RBC AUTO-ENTMCNC: 30.9 G/DL (ref 30–37)
MCV RBC AUTO: 68.9 FL (ref 81–99)
MONOCYTES # BLD MANUAL: 0.84 10*3/MM3 (ref 0–0.9)
MONOCYTES NFR BLD MANUAL: 10 % (ref 0–12)
NEUTROPHILS # BLD MANUAL: 3.69 10*3/MM3 (ref 2–6.9)
NEUTROPHILS NFR BLD MANUAL: 40 % (ref 37–80)
PLATELET # BLD AUTO: 450 10*3/MM3 (ref 130–400)
PLATELET BLD QL SMEAR: NORMAL
POTASSIUM SERPL-SCNC: 3.7 MMOL/L (ref 3.5–5.1)
PROT SERPL-MCNC: 8.4 G/DL (ref 6.3–8.2)
RBC # BLD AUTO: 5.5 10*6/MM3 (ref 4.2–5.4)
RBC MORPH BLD: NORMAL
SODIUM SERPL-SCNC: 147 MMOL/L (ref 137–145)
TRIGL SERPL-MCNC: 117 MG/DL
TSH SERPL DL<=0.005 MIU/L-ACNC: 3.8 MIU/ML (ref 0.47–4.68)
VLDLC SERPL CALC-MCNC: 23.4 MG/DL
WBC # BLD AUTO: 8.39 10*3/MM3 (ref 4.8–10.8)

## 2018-03-05 RX ORDER — GABAPENTIN 100 MG/1
CAPSULE ORAL
Qty: 180 CAPSULE | Refills: 3 | Status: SHIPPED | OUTPATIENT
Start: 2018-03-05 | End: 2018-08-30 | Stop reason: SDUPTHER

## 2018-04-03 ENCOUNTER — OFFICE VISIT (OUTPATIENT)
Dept: INTERNAL MEDICINE | Facility: CLINIC | Age: 68
End: 2018-04-03

## 2018-04-03 VITALS
DIASTOLIC BLOOD PRESSURE: 80 MMHG | TEMPERATURE: 99 F | BODY MASS INDEX: 21.17 KG/M2 | SYSTOLIC BLOOD PRESSURE: 138 MMHG | HEART RATE: 68 BPM | RESPIRATION RATE: 14 BRPM | OXYGEN SATURATION: 98 % | WEIGHT: 105 LBS | HEIGHT: 59 IN

## 2018-04-03 DIAGNOSIS — F32.A DEPRESSION, UNSPECIFIED DEPRESSION TYPE: ICD-10-CM

## 2018-04-03 DIAGNOSIS — E55.9 VITAMIN D DEFICIENCY: ICD-10-CM

## 2018-04-03 DIAGNOSIS — K21.9 GASTROESOPHAGEAL REFLUX DISEASE WITHOUT ESOPHAGITIS: ICD-10-CM

## 2018-04-03 DIAGNOSIS — K59.00 CONSTIPATION, UNSPECIFIED CONSTIPATION TYPE: ICD-10-CM

## 2018-04-03 DIAGNOSIS — R07.9 CHEST PAIN, UNSPECIFIED TYPE: ICD-10-CM

## 2018-04-03 DIAGNOSIS — R73.9 HYPERGLYCEMIA: ICD-10-CM

## 2018-04-03 DIAGNOSIS — D56.8 OTHER THALASSEMIA (HCC): ICD-10-CM

## 2018-04-03 DIAGNOSIS — G50.0 TRIGEMINAL NEURALGIA: ICD-10-CM

## 2018-04-03 DIAGNOSIS — D64.9 ANEMIA, UNSPECIFIED TYPE: ICD-10-CM

## 2018-04-03 DIAGNOSIS — I10 ESSENTIAL HYPERTENSION: Primary | ICD-10-CM

## 2018-04-03 PROCEDURE — 99214 OFFICE O/P EST MOD 30 MIN: CPT | Performed by: INTERNAL MEDICINE

## 2018-04-03 PROCEDURE — 93000 ELECTROCARDIOGRAM COMPLETE: CPT | Performed by: INTERNAL MEDICINE

## 2018-04-03 RX ORDER — ATORVASTATIN CALCIUM 40 MG/1
40 TABLET, FILM COATED ORAL NIGHTLY
Qty: 30 TABLET | Refills: 1 | Status: SHIPPED | OUTPATIENT
Start: 2018-04-03 | End: 2018-05-29 | Stop reason: SDUPTHER

## 2018-04-03 NOTE — PROGRESS NOTES
Subjective   Antonio Barnes is a 67 y.o. female.     Chief Complaint   Patient presents with   • Follow-up       History of Present Illness   Patient here for follow-up.  The patient has a hyperlipidemia cholesterol  total cholesterol 231 patient is on 2 medications.  Depression stable medication.  Blood pressure improved.  Patient also complains neck and left anterior chest cramping sensation comes and goes usually once  in 3 months.  Patient denies any short of breath palpitation dizziness.  Patient states cramping is not related to exertion.  Anemia patient has a thalassemia patient was seen by hematologist in the past.  Hemoglobin stable now.    Current Outpatient Prescriptions:   •  alendronate (FOSAMAX) 70 MG tablet, Take 1 tablet by mouth 1 (one) time per week., Disp: , Rfl:   •  amitriptyline (ELAVIL) 10 MG tablet, Take 1.5 tablet daily at dinner., Disp: 90 tablet, Rfl: 3  •  amLODIPine (NORVASC) 10 MG tablet, TAKE 1 TABLET DAILY AS DIRECTED, Disp: 90 tablet, Rfl: 3  •  B Complex Vitamins (VITAMIN B COMPLEX) tablet, Take 1 tablet by mouth daily., Disp: , Rfl:   •  calcium-vitamin D (OSCAL-500) 500-200 MG-UNIT per tablet, Take 1 tablet by mouth 2 (two) times a day., Disp: , Rfl:   •  carBAMazepine (TEGRETOL) 200 MG tablet, Take 0.5 tablets by mouth 3 (Three) Times a Day., Disp: 90 tablet, Rfl: 3  •  Cholecalciferol (VITAMIN D3) 1000 UNITS capsule, Take 1 capsule by mouth daily., Disp: , Rfl:   •  fenofibrate micronized (LOFIBRA) 134 MG capsule, Take 1 capsule by mouth Every Other Day., Disp: 45 capsule, Rfl: 3  •  gabapentin (NEURONTIN) 100 MG capsule, TAKE 1 CAPSULE TWICE DAILY (AT LUNCH AND AT BEDTIME) AS NEEDED FOR FACIAL PAIN , Disp: 180 capsule, Rfl: 3  •  gabapentin (NEURONTIN) 300 MG capsule, , Disp: , Rfl:   •  Omega-3 Fatty Acids (FISH OIL PO), 1000 mg twice daily., Disp: , Rfl:   •  omeprazole (priLOSEC) 20 MG capsule, TAKE 1 CAPSULE DAILY., Disp: 90 capsule, Rfl: 3  •  pravastatin  (PRAVACHOL) 80 MG tablet, TAKE 1 TABLET EVERY DAY, Disp: 90 tablet, Rfl: 3  •  sertraline (ZOLOFT) 50 MG tablet, TAKE 1 TABLET EVERY DAY, Disp: 90 tablet, Rfl: 3    The following portions of the patient's history were reviewed and updated as appropriate: allergies, current medications, past family history, past medical history, past social history, past surgical history and problem list.    Review of Systems   Constitutional: Negative.    Respiratory: Negative.    Cardiovascular: Negative.    Gastrointestinal: Negative.    Musculoskeletal: Negative.    Skin: Negative.    Neurological: Negative.    Psychiatric/Behavioral: Negative.        Objective   Physical Exam   Constitutional: She is oriented to person, place, and time. She appears well-nourished.   Neck: Neck supple.   Cardiovascular: Normal rate, regular rhythm and normal heart sounds.    Pulmonary/Chest: Effort normal and breath sounds normal.   Abdominal: Bowel sounds are normal.   Neurological: She is alert and oriented to person, place, and time.   Skin: Skin is warm.   Psychiatric: She has a normal mood and affect.       All tests have been reviewed.    Assessment/Plan   There are no diagnoses linked to this encounter.           constipation improved after metamucil   Low back pain improved. cotninue Flexeril and the Tylenol patient declines physical therapy  Neck pain improved XR mild arthritis Flexeril and Tylenol patient declines a physical therapy  Osteoporosis,improved after Fosamax 70 mg once a week. continue Fosamax. Os-Tate D 500 mg twice a day. Weightbearing exercise.   Vitamin D deficiency continue vitamin D supplements  Thalassemia trait continue to watch, seen by  Hematologist  anemia now repeat Hb 10.5, s/p colonoscopy, 3/2015. need EGD and SBFT , patient declined  Hyperlipidemia continue good diet and fenofibrate 134mg, change pravastatin to lipitor---  GERD continue medicine  Depression continue sertraline   HTN continue medicine,   Left  knee OA XR mini DJD PT patient declined  history breast ca right mastectomy 1994  Trigeminal neuralgia follow up with neuro,   gyn done [place] no more needed  Colon 3/2016, only divertic and hemorroid  [id number], prevnar 13 done, zostavax done, Tdap done, pneumovax done  SK watch for color and size changes  Chest pain  Atypical  Do EKG now  follow up 1 mo after labs      ECG 12 Lead  Date/Time: 4/3/2018 2:35 PM  Performed by: ELVIN LEON  Authorized by: ELVIN LEON   Comparison: not compared with previous ECG   Previous ECG: no previous ECG available  Rhythm: sinus rhythm  Ectopy: PVCs  Rate: normal  Conduction: conduction normal  ST Segments: ST segments normal  T Waves: T waves normal  Other: no other findings  Clinical impression: normal ECG

## 2018-04-04 ENCOUNTER — TRANSCRIBE ORDERS (OUTPATIENT)
Dept: ADMINISTRATIVE | Facility: HOSPITAL | Age: 68
End: 2018-04-04

## 2018-04-04 DIAGNOSIS — Z12.31 VISIT FOR SCREENING MAMMOGRAM: Primary | ICD-10-CM

## 2018-04-08 ENCOUNTER — RESULTS ENCOUNTER (OUTPATIENT)
Dept: INTERNAL MEDICINE | Facility: CLINIC | Age: 68
End: 2018-04-08

## 2018-04-08 DIAGNOSIS — D56.8 OTHER THALASSEMIA (HCC): ICD-10-CM

## 2018-04-08 DIAGNOSIS — E55.9 VITAMIN D DEFICIENCY: ICD-10-CM

## 2018-04-08 DIAGNOSIS — D64.9 ANEMIA, UNSPECIFIED TYPE: ICD-10-CM

## 2018-04-08 DIAGNOSIS — I10 ESSENTIAL HYPERTENSION: ICD-10-CM

## 2018-04-09 ENCOUNTER — HOSPITAL ENCOUNTER (OUTPATIENT)
Dept: CARDIOLOGY | Facility: HOSPITAL | Age: 68
Discharge: HOME | End: 2018-04-09
Attending: INTERNAL MEDICINE

## 2018-04-09 VITALS — HEIGHT: 59 IN | WEIGHT: 105 LBS | BODY MASS INDEX: 21.17 KG/M2

## 2018-04-09 DIAGNOSIS — R07.9 CHEST PAIN, UNSPECIFIED TYPE: ICD-10-CM

## 2018-04-09 PROCEDURE — 78452 HT MUSCLE IMAGE SPECT MULT: CPT | Performed by: INTERNAL MEDICINE

## 2018-04-09 PROCEDURE — 93017 CV STRESS TEST TRACING ONLY: CPT

## 2018-04-09 PROCEDURE — 93018 CV STRESS TEST I&R ONLY: CPT | Performed by: INTERNAL MEDICINE

## 2018-04-09 PROCEDURE — 0 TECHNETIUM SESTAMIBI: Performed by: INTERNAL MEDICINE

## 2018-04-09 PROCEDURE — A9500 TC99M SESTAMIBI: HCPCS | Performed by: INTERNAL MEDICINE

## 2018-04-09 PROCEDURE — 78452 HT MUSCLE IMAGE SPECT MULT: CPT

## 2018-04-09 RX ADMIN — TECHNETIUM TC 99M SESTAMIBI 1 DOSE: 1 INJECTION INTRAVENOUS at 08:25

## 2018-04-09 RX ADMIN — TECHNETIUM TC 99M SESTAMIBI 1 DOSE: 1 INJECTION INTRAVENOUS at 10:40

## 2018-04-10 LAB
BH CV NUCLEAR PRIOR STUDY: 2
BH CV STRESS BP STAGE 1: NORMAL
BH CV STRESS BP STAGE 2: NORMAL
BH CV STRESS DURATION MIN STAGE 1: 3
BH CV STRESS DURATION MIN STAGE 2: 3
BH CV STRESS DURATION SEC STAGE 1: 0
BH CV STRESS DURATION SEC STAGE 2: 0
BH CV STRESS GRADE STAGE 1: 10
BH CV STRESS GRADE STAGE 2: 12
BH CV STRESS HR STAGE 1: 117
BH CV STRESS HR STAGE 2: 134
BH CV STRESS METS STAGE 1: 5
BH CV STRESS METS STAGE 2: 7.2
BH CV STRESS O2 STAGE 2: 99
BH CV STRESS PROTOCOL 1: NORMAL
BH CV STRESS RECOVERY BP: NORMAL MMHG
BH CV STRESS RECOVERY HR: 96 BPM
BH CV STRESS RECOVERY O2: 98 %
BH CV STRESS SPEED STAGE 1: 1.7
BH CV STRESS SPEED STAGE 2: 2.5
BH CV STRESS STAGE 1: 1
BH CV STRESS STAGE 2: 2
LV EF NUC BP: 78 %
MAXIMAL PREDICTED HEART RATE: 152 BPM
PERCENT MAX PREDICTED HR: 89.47 %
STRESS BASELINE BP: NORMAL MMHG
STRESS BASELINE HR: 87 BPM
STRESS O2 SAT REST: 96 %
STRESS PERCENT HR: 105 %
STRESS POST ESTIMATED WORKLOAD: 7.2 METS
STRESS POST EXERCISE DUR MIN: 6 MIN
STRESS POST EXERCISE DUR SEC: 0 SEC
STRESS POST O2 SAT PEAK: 98 %
STRESS POST PEAK BP: NORMAL MMHG
STRESS POST PEAK HR: 136 BPM
STRESS TARGET HR: 129 BPM

## 2018-05-09 ENCOUNTER — HOSPITAL ENCOUNTER (OUTPATIENT)
Dept: MAMMOGRAPHY | Facility: HOSPITAL | Age: 68
Discharge: HOME | End: 2018-05-09
Attending: INTERNAL MEDICINE | Admitting: INTERNAL MEDICINE

## 2018-05-09 DIAGNOSIS — Z12.31 VISIT FOR SCREENING MAMMOGRAM: ICD-10-CM

## 2018-05-09 PROCEDURE — 77063 BREAST TOMOSYNTHESIS BI: CPT | Performed by: RADIOLOGY

## 2018-05-09 PROCEDURE — 77067 SCR MAMMO BI INCL CAD: CPT

## 2018-05-09 PROCEDURE — 77067 SCR MAMMO BI INCL CAD: CPT | Performed by: RADIOLOGY

## 2018-05-09 PROCEDURE — 77063 BREAST TOMOSYNTHESIS BI: CPT

## 2018-05-29 RX ORDER — ATORVASTATIN CALCIUM 40 MG/1
40 TABLET, FILM COATED ORAL NIGHTLY
Qty: 30 TABLET | Refills: 4 | Status: SHIPPED | OUTPATIENT
Start: 2018-05-29 | End: 2018-10-29 | Stop reason: SDUPTHER

## 2018-06-12 ENCOUNTER — APPOINTMENT (OUTPATIENT)
Dept: LAB | Facility: HOSPITAL | Age: 68
End: 2018-06-12

## 2018-06-12 LAB
25(OH)D3 SERPL-MCNC: 68.9 NG/ML
ALBUMIN SERPL-MCNC: 5.1 G/DL (ref 3.5–5)
ALBUMIN/GLOB SERPL: 1.4 G/DL (ref 1–2)
ALP SERPL-CCNC: 70 U/L (ref 38–126)
ALT SERPL W P-5'-P-CCNC: 30 U/L (ref 13–69)
ANION GAP SERPL CALCULATED.3IONS-SCNC: 14.9 MMOL/L (ref 10–20)
AST SERPL-CCNC: 44 U/L (ref 15–46)
BILIRUB SERPL-MCNC: 0.3 MG/DL (ref 0.2–1.3)
BILIRUB UR QL STRIP: NEGATIVE
BUN BLD-MCNC: 15 MG/DL (ref 7–20)
BUN/CREAT SERPL: 21.4 (ref 7.1–23.5)
CALCIUM SPEC-SCNC: 9.8 MG/DL (ref 8.4–10.2)
CHLORIDE SERPL-SCNC: 103 MMOL/L (ref 98–107)
CHOLEST SERPL-MCNC: 189 MG/DL (ref 0–199)
CK SERPL-CCNC: 150 U/L (ref 30–170)
CLARITY UR: CLEAR
CO2 SERPL-SCNC: 30 MMOL/L (ref 26–30)
COLOR UR: YELLOW
CREAT BLD-MCNC: 0.7 MG/DL (ref 0.6–1.3)
FERRITIN SERPL-MCNC: 33.7 NG/ML (ref 11.1–264)
GFR SERPL CREATININE-BSD FRML MDRD: 101 ML/MIN/1.73
GFR SERPL CREATININE-BSD FRML MDRD: 83 ML/MIN/1.73
GLOBULIN UR ELPH-MCNC: 3.6 GM/DL
GLUCOSE BLD-MCNC: 103 MG/DL (ref 74–98)
GLUCOSE UR STRIP-MCNC: NEGATIVE MG/DL
HBA1C MFR BLD: 6.3 % (ref 3–6)
HDLC SERPL-MCNC: 66 MG/DL (ref 40–60)
HGB UR QL STRIP.AUTO: NEGATIVE
IRON 24H UR-MRATE: 94 MCG/DL (ref 37–181)
IRON SATN MFR SERPL: 21 % (ref 11–46)
KETONES UR QL STRIP: NEGATIVE
LDLC SERPL CALC-MCNC: 106 MG/DL (ref 0–99)
LDLC/HDLC SERPL: 1.61 {RATIO}
LEUKOCYTE ESTERASE UR QL STRIP.AUTO: NEGATIVE
NITRITE UR QL STRIP: NEGATIVE
PH UR STRIP.AUTO: 7 [PH] (ref 5–8)
POTASSIUM BLD-SCNC: 3.9 MMOL/L (ref 3.5–5.1)
PROT SERPL-MCNC: 8.7 G/DL (ref 6.3–8.2)
PROT UR QL STRIP: NEGATIVE
SODIUM BLD-SCNC: 144 MMOL/L (ref 137–145)
SP GR UR STRIP: 1.01 (ref 1–1.03)
TIBC SERPL-MCNC: 449 MCG/DL (ref 261–497)
TRIGL SERPL-MCNC: 85 MG/DL
UROBILINOGEN UR QL STRIP: NORMAL
VLDLC SERPL-MCNC: 17 MG/DL

## 2018-06-12 PROCEDURE — 82306 VITAMIN D 25 HYDROXY: CPT | Performed by: INTERNAL MEDICINE

## 2018-06-12 PROCEDURE — 80053 COMPREHEN METABOLIC PANEL: CPT | Performed by: INTERNAL MEDICINE

## 2018-06-12 PROCEDURE — 82550 ASSAY OF CK (CPK): CPT | Performed by: INTERNAL MEDICINE

## 2018-06-12 PROCEDURE — 83036 HEMOGLOBIN GLYCOSYLATED A1C: CPT | Performed by: INTERNAL MEDICINE

## 2018-06-12 PROCEDURE — 80061 LIPID PANEL: CPT | Performed by: INTERNAL MEDICINE

## 2018-06-12 PROCEDURE — 81003 URINALYSIS AUTO W/O SCOPE: CPT | Performed by: INTERNAL MEDICINE

## 2018-06-12 PROCEDURE — 83540 ASSAY OF IRON: CPT | Performed by: INTERNAL MEDICINE

## 2018-06-12 PROCEDURE — 82330 ASSAY OF CALCIUM: CPT | Performed by: INTERNAL MEDICINE

## 2018-06-12 PROCEDURE — 36415 COLL VENOUS BLD VENIPUNCTURE: CPT | Performed by: INTERNAL MEDICINE

## 2018-06-12 PROCEDURE — 83550 IRON BINDING TEST: CPT | Performed by: INTERNAL MEDICINE

## 2018-06-12 PROCEDURE — 82728 ASSAY OF FERRITIN: CPT | Performed by: INTERNAL MEDICINE

## 2018-06-13 LAB — CA-I SERPL ISE-MCNC: 5.2 MG/DL (ref 4.5–5.6)

## 2018-06-21 LAB — HBA1 GENE MUT TESTED BLD/T: NORMAL

## 2018-07-19 ENCOUNTER — OFFICE VISIT (OUTPATIENT)
Dept: INTERNAL MEDICINE | Facility: CLINIC | Age: 68
End: 2018-07-19

## 2018-07-19 VITALS
DIASTOLIC BLOOD PRESSURE: 64 MMHG | WEIGHT: 103 LBS | SYSTOLIC BLOOD PRESSURE: 132 MMHG | OXYGEN SATURATION: 98 % | TEMPERATURE: 99 F | BODY MASS INDEX: 20.76 KG/M2 | HEIGHT: 59 IN | RESPIRATION RATE: 14 BRPM | HEART RATE: 68 BPM

## 2018-07-19 DIAGNOSIS — G50.0 TRIGEMINAL NEURALGIA: ICD-10-CM

## 2018-07-19 DIAGNOSIS — I10 ESSENTIAL HYPERTENSION: Primary | ICD-10-CM

## 2018-07-19 DIAGNOSIS — D64.9 ANEMIA, UNSPECIFIED TYPE: ICD-10-CM

## 2018-07-19 DIAGNOSIS — K59.00 CONSTIPATION, UNSPECIFIED CONSTIPATION TYPE: ICD-10-CM

## 2018-07-19 DIAGNOSIS — M54.2 NECK PAIN: ICD-10-CM

## 2018-07-19 DIAGNOSIS — D56.8 OTHER THALASSEMIA (HCC): ICD-10-CM

## 2018-07-19 DIAGNOSIS — E55.9 VITAMIN D DEFICIENCY: ICD-10-CM

## 2018-07-19 DIAGNOSIS — F32.A DEPRESSION, UNSPECIFIED DEPRESSION TYPE: ICD-10-CM

## 2018-07-19 DIAGNOSIS — R53.83 OTHER FATIGUE: ICD-10-CM

## 2018-07-19 DIAGNOSIS — M81.0 OSTEOPOROSIS, UNSPECIFIED OSTEOPOROSIS TYPE, UNSPECIFIED PATHOLOGICAL FRACTURE PRESENCE: ICD-10-CM

## 2018-07-19 PROCEDURE — 99214 OFFICE O/P EST MOD 30 MIN: CPT | Performed by: INTERNAL MEDICINE

## 2018-07-19 RX ORDER — CALCITONIN SALMON 200 [IU]/.09ML
1 SPRAY, METERED NASAL DAILY
Qty: 1 EACH | Refills: 12 | Status: SHIPPED | OUTPATIENT
Start: 2018-07-19 | End: 2019-01-11

## 2018-07-19 RX ORDER — DOXYCYCLINE HYCLATE 50 MG/1
CAPSULE, GELATIN COATED ORAL
COMMUNITY
Start: 2018-06-11 | End: 2018-08-22 | Stop reason: SDUPTHER

## 2018-07-19 NOTE — PROGRESS NOTES
Subjective   Antonio Barnes is a 67 y.o. female.     Chief Complaint   Patient presents with   • Follow-up       History of Present Illness   Patient here for follow-up of.  The chest pain resolved.  Anemia improved to 8 hemoglobin 11.7.  Constipation improved.  Thalassemia trait stable now.  Hyperlipidemia improved after changing her medicine to Lipitor.  The GERD stable medication.  Depression stable without medication still some depressed depressive episodes.  Hypertension stable medication.    Current Outpatient Prescriptions:   •  alendronate (FOSAMAX) 70 MG tablet, Take 1 tablet by mouth 1 (one) time per week., Disp: , Rfl:   •  amitriptyline (ELAVIL) 10 MG tablet, Take 1.5 tablet daily at dinner., Disp: 90 tablet, Rfl: 3  •  amLODIPine (NORVASC) 10 MG tablet, TAKE 1 TABLET DAILY AS DIRECTED, Disp: 90 tablet, Rfl: 3  •  atorvastatin (LIPITOR) 40 MG tablet, Take 1 tablet by mouth Every Night., Disp: 30 tablet, Rfl: 4  •  B Complex Vitamins (VITAMIN B COMPLEX) tablet, Take 1 tablet by mouth daily., Disp: , Rfl:   •  calcium-vitamin D (OSCAL-500) 500-200 MG-UNIT per tablet, Take 1 tablet by mouth 2 (two) times a day., Disp: , Rfl:   •  carBAMazepine (TEGRETOL) 200 MG tablet, Take 0.5 tablets by mouth 3 (Three) Times a Day., Disp: 90 tablet, Rfl: 3  •  Cholecalciferol (VITAMIN D3) 1000 UNITS capsule, Take 1 capsule by mouth daily., Disp: , Rfl:   •  fenofibrate micronized (LOFIBRA) 134 MG capsule, Take 1 capsule by mouth Every Other Day., Disp: 45 capsule, Rfl: 3  •  ferrous gluconate (FERGON) 324 MG tablet, , Disp: , Rfl:   •  gabapentin (NEURONTIN) 100 MG capsule, TAKE 1 CAPSULE TWICE DAILY (AT LUNCH AND AT BEDTIME) AS NEEDED FOR FACIAL PAIN , Disp: 180 capsule, Rfl: 3  •  gabapentin (NEURONTIN) 300 MG capsule, , Disp: , Rfl:   •  Omega-3 Fatty Acids (FISH OIL PO), 1000 mg twice daily., Disp: , Rfl:   •  omeprazole (priLOSEC) 20 MG capsule, TAKE 1 CAPSULE DAILY., Disp: 90 capsule, Rfl: 3  •  sertraline (ZOLOFT)  50 MG tablet, TAKE 1 TABLET EVERY DAY, Disp: 90 tablet, Rfl: 3    The following portions of the patient's history were reviewed and updated as appropriate: allergies, current medications, past family history, past medical history, past social history, past surgical history and problem list.    Review of Systems   Constitutional: Negative.    Respiratory: Negative.    Cardiovascular: Negative.    Gastrointestinal: Negative.    Musculoskeletal: Negative.    Skin: Negative.    Neurological: Negative.    Psychiatric/Behavioral: Negative.        Objective   Physical Exam   Constitutional: She is oriented to person, place, and time. She appears well-nourished.   Neck: Neck supple.   Cardiovascular: Normal rate, regular rhythm and normal heart sounds.    Pulmonary/Chest: Effort normal and breath sounds normal.   Abdominal: Bowel sounds are normal.   Neurological: She is alert and oriented to person, place, and time.   Skin: Skin is warm.   Psychiatric: She has a normal mood and affect.       All tests have been reviewed.    Assessment/Plan   There are no diagnoses linked to this encounter.            constipation improved after metamucil   Low back pain improved. continue Flexeril and the Tylenol patient declines physical therapy  Neck pain improved XR mild arthritis Flexeril and Tylenol patient declines a physical therapy  Osteoporosis,unable to take Fosamax 70 mg once a week due to stomach upset.  Os-Tate D 500 mg twice a day. Weightbearing exercise. Start miacalcin--  Vitamin D deficiency continue vitamin D supplements  Thalassemia trait continue to watch, seen by  Hematologist  Hb11.7, s/p colonoscopy, 3/2015. need EGD and SBFT , patient declined  Hyperlipidemia continue good diet and fenofibrate 134mg, change lipitor---  GERD continue medicine  Depression continue sertraline   HTN continue medicine,   Left knee OA XR mini DJD PT patient declined  history breast ca right mastectomy 1994--  Trigeminal neuralgia follow up  with neuro,   gyn done [place] no more needed  Colon 3/2016, only divertic and hemorroid  [id number], prevnar 13 done, zostavax done, Tdap done, pneumovax done, shingrix informed  SK watch for color and size changes  Chest pain resolved   6 mo

## 2018-08-22 RX ORDER — OMEPRAZOLE 20 MG/1
20 CAPSULE, DELAYED RELEASE ORAL DAILY
Qty: 90 CAPSULE | Refills: 1 | Status: SHIPPED | OUTPATIENT
Start: 2018-08-22 | End: 2019-01-11

## 2018-08-22 RX ORDER — AMLODIPINE BESYLATE 10 MG/1
10 TABLET ORAL DAILY
Qty: 90 TABLET | Refills: 1 | Status: SHIPPED | OUTPATIENT
Start: 2018-08-22 | End: 2019-02-05 | Stop reason: SDUPTHER

## 2018-08-22 RX ORDER — DOXYCYCLINE HYCLATE 50 MG/1
324 CAPSULE, GELATIN COATED ORAL
Qty: 90 TABLET | Refills: 1 | Status: SHIPPED | OUTPATIENT
Start: 2018-08-22 | End: 2019-01-11

## 2018-09-02 RX ORDER — GABAPENTIN 100 MG/1
CAPSULE ORAL
Qty: 180 CAPSULE | Refills: 3 | Status: SHIPPED | OUTPATIENT
Start: 2018-09-02 | End: 2019-05-31 | Stop reason: SDUPTHER

## 2018-09-10 DIAGNOSIS — G50.0 TRIGEMINAL NEURALGIA: ICD-10-CM

## 2018-09-10 RX ORDER — AMITRIPTYLINE HYDROCHLORIDE 10 MG/1
TABLET, FILM COATED ORAL
Qty: 135 TABLET | Refills: 1 | Status: SHIPPED | OUTPATIENT
Start: 2018-09-10 | End: 2019-02-04 | Stop reason: SDUPTHER

## 2018-10-09 ENCOUNTER — TELEPHONE (OUTPATIENT)
Dept: NEUROLOGY | Facility: CLINIC | Age: 68
End: 2018-10-09

## 2018-10-09 NOTE — TELEPHONE ENCOUNTER
PT'S DAUGHTER CALLED TO R/S PT'S  PASSED AWAY. PT HAS BEEN R/S FOR 11/07/18 BUT WILL RUN OUT OF THE GABAPENTIN 300MG TAKE ONE AS NEEDED. PT ALSO USES GABAPENTIN 100MG BID PRN. SHE USES THE 300MG WITH THE PAIN IS REALLY BAD. PER HER DAUGHTER DR. BABIN IS THE ONE WHO FILLED.

## 2018-10-29 RX ORDER — ATORVASTATIN CALCIUM 40 MG/1
40 TABLET, FILM COATED ORAL NIGHTLY
Qty: 90 TABLET | Refills: 1 | Status: SHIPPED | OUTPATIENT
Start: 2018-10-29 | End: 2019-07-15 | Stop reason: SDUPTHER

## 2018-10-29 RX ORDER — GABAPENTIN 300 MG/1
CAPSULE ORAL
Status: CANCELLED | OUTPATIENT
Start: 2018-10-29

## 2018-10-29 RX ORDER — ATORVASTATIN CALCIUM 40 MG/1
40 TABLET, FILM COATED ORAL NIGHTLY
Qty: 30 TABLET | Refills: 4 | Status: SHIPPED | OUTPATIENT
Start: 2018-10-29 | End: 2018-10-29 | Stop reason: SDUPTHER

## 2018-11-13 ENCOUNTER — OFFICE VISIT (OUTPATIENT)
Dept: NEUROLOGY | Facility: CLINIC | Age: 68
End: 2018-11-13

## 2018-11-13 VITALS
HEART RATE: 68 BPM | OXYGEN SATURATION: 94 % | BODY MASS INDEX: 20.8 KG/M2 | SYSTOLIC BLOOD PRESSURE: 140 MMHG | DIASTOLIC BLOOD PRESSURE: 68 MMHG | HEIGHT: 59 IN

## 2018-11-13 DIAGNOSIS — G50.0 TRIGEMINAL NEURALGIA: Primary | ICD-10-CM

## 2018-11-13 PROCEDURE — 99214 OFFICE O/P EST MOD 30 MIN: CPT | Performed by: PSYCHIATRY & NEUROLOGY

## 2018-11-13 RX ORDER — GABAPENTIN 300 MG/1
300 CAPSULE ORAL 3 TIMES DAILY
Qty: 270 CAPSULE | Refills: 1 | Status: SHIPPED | OUTPATIENT
Start: 2018-11-13 | End: 2019-01-11

## 2018-11-13 RX ORDER — CARBAMAZEPINE 200 MG/1
100 TABLET ORAL 3 TIMES DAILY
Qty: 270 TABLET | Refills: 1 | Status: SHIPPED | OUTPATIENT
Start: 2018-11-13 | End: 2019-09-23 | Stop reason: SDUPTHER

## 2018-11-13 NOTE — PROGRESS NOTES
[facility] [place] CONSULTATION   History of Present Illness     Date: 11/13/2018    Patient Identification  Antonio Barnes is a 68 y.o. female.    Patient information was obtained from patient.  History/Exam limitations: none.    CONSULTATION requested by: No ref. provider found      Chief Complaint   Trigeminal Neuralgia (Pt in office today for follow up appt ) and Med Refill      History of Present Illness   Patient is a pleasant 68-year-old referred to [facility] neurology [place] for evaluation of trigeminal neuralgia.  Patient has been clinically stable on Tegretol and gabapentin.  Patient would like to have refueling today visits  PMH:   Past Medical History:   Diagnosis Date   • Anemia    • Breast cancer (CMS/HCC) 1994    RIGHT   • Esophagitis, reflux    • High cholesterol    • History of colonoscopy 2010    (Fiberoptic)   • History of mammogram    • History of Papanicolaou smear of cervix     History of reported pap smear   • Osteoarthritis    • Peptic ulcer    • Trigeminal neuralgia    • Urethral stricture due to infection        Past Surgical History:   Past Surgical History:   Procedure Laterality Date   • APPENDECTOMY     • BREAST BIOPSY Right 1994   • COLONOSCOPY      Colonoscopy (Fiberoptic)   • MASTECTOMY Right 1994   • TONSILLECTOMY     • TONSILLECTOMY         Family Hisotry:   Family History   Problem Relation Age of Onset   • Hypertension Mother    • Kidney disease Mother    • Heart attack Father    • Breast cancer Maternal Aunt         AGE UNKNOWN   • Ovarian cancer Neg Hx        Social History:   Social History     Socioeconomic History   • Marital status:      Spouse name: Not on file   • Number of children: Not on file   • Years of education: Not on file   • Highest education level: Not on file   Social Needs   • Financial resource strain: Not on file   • Food insecurity - worry: Not on file   • Food insecurity - inability: Not on file   • Transportation needs -  medical: Not on file   • Transportation needs - non-medical: Not on file   Occupational History   • Not on file   Tobacco Use   • Smoking status: Former Smoker     Packs/day: 1.00     Years: 20.00     Pack years: 20.00     Types: Cigarettes     Last attempt to quit:      Years since quittin.8   • Smokeless tobacco: Never Used   • Tobacco comment: Quit smoking 2007   Substance and Sexual Activity   • Alcohol use: No   • Drug use: No   • Sexual activity: No     Partners: Male     Birth control/protection: Post-menopausal   Other Topics Concern   • Not on file   Social History Narrative   • Not on file       Medications:   Current Outpatient Medications   Medication Sig Dispense Refill   • amitriptyline (ELAVIL) 10 MG tablet Take 1.5 tablet daily at dinner. 135 tablet 1   • amLODIPine (NORVASC) 10 MG tablet Take 1 tablet by mouth Daily. as directed 90 tablet 1   • atorvastatin (LIPITOR) 40 MG tablet Take 1 tablet by mouth Every Night. 90 tablet 1   • B Complex Vitamins (VITAMIN B COMPLEX) tablet Take 1 tablet by mouth daily.     • calcitonin, salmon, (MIACALCIN) 200 UNIT/ACT nasal spray 1 spray into each nostril Daily. 1 each 12   • calcium-vitamin D (OSCAL-500) 500-200 MG-UNIT per tablet Take 1 tablet by mouth 2 (two) times a day.     • carBAMazepine (TEGRETOL) 200 MG tablet Take 0.5 tablets by mouth 3 (Three) Times a Day. 270 tablet 1   • Cholecalciferol (VITAMIN D3) 1000 UNITS capsule Take 1 capsule by mouth daily.     • fenofibrate micronized (LOFIBRA) 134 MG capsule Take 1 capsule by mouth Every Other Day. 45 capsule 3   • ferrous gluconate (FERGON) 324 MG tablet Take 1 tablet by mouth Daily With Breakfast. 90 tablet 1   • gabapentin (NEURONTIN) 100 MG capsule Take 1 capsule twice daily (at lunch and at bedtime) PRN facial pain 180 capsule 3   • gabapentin (NEURONTIN) 300 MG capsule Take 1 capsule by mouth 3 (Three) Times a Day. 270 capsule 1   • Omega-3 Fatty Acids (FISH OIL PO) 1000 mg twice daily.  "    • omeprazole (priLOSEC) 20 MG capsule Take 1 capsule by mouth Daily. 90 capsule 1   • sertraline (ZOLOFT) 50 MG tablet TAKE 1 TABLET EVERY DAY 90 tablet 3     No current facility-administered medications for this visit.        Allergy:   Allergies   Allergen Reactions   • Ace Inhibitors Cough   • Penicillins        Review of Systems:  Review of Systems   Constitutional: Negative for chills and fever.   HENT: Negative for congestion, ear pain, hearing loss, rhinorrhea and sore throat.    Eyes: Negative for pain, discharge and redness.   Respiratory: Negative for cough, shortness of breath, wheezing and stridor.    Cardiovascular: Negative for chest pain, palpitations and leg swelling.   Gastrointestinal: Negative for abdominal pain, constipation, nausea and vomiting.   Endocrine: Negative for cold intolerance, heat intolerance and polyphagia.   Genitourinary: Negative for dysuria, flank pain, frequency and urgency.   Musculoskeletal: Negative for joint swelling, myalgias, neck pain and neck stiffness.   Skin: Negative for pallor, rash and wound.   Allergic/Immunologic: Negative for environmental allergies.   Neurological: Positive for numbness. Negative for dizziness, tremors, seizures, syncope, facial asymmetry, speech difficulty, weakness, light-headedness and headaches.   Hematological: Negative for adenopathy.   Psychiatric/Behavioral: Positive for dysphoric mood and sleep disturbance. Negative for confusion and hallucinations. The patient is not nervous/anxious.        Physical Exam     Vitals:    11/13/18 1022   BP: 140/68   Pulse: 68   SpO2: 94%   Height: 149.9 cm (59\")     GENERAL: Patient is pleasant, cooperative, appears to be stated age.  Body habitus is endomorphic.  SKIN AND EXTREMITIES:  No skin rashes or lesions are noted.  No cyanosis, clubbing or edema of the extremities.    HEAD:  Head is normocephalic and atraumatic.    NECK: Nontender without thyromegaly or adenopathy.  Carotid upstrokes are " 1+/4.  No cranial or cervical bruits.  The neck is supple with a full range of motion.   ENT: Palate elevates symmetrically.  No evidence of high arch palate.  Tongue midline.  Erythema in posterior pharynx.  Mallampati Classification Class III   CARDIOVASCULAR:  Regular rate and rhythm with normal S1 and S2 without rub or gallop.  RESPIRATORY:  Clear to auscultation without wheezes or crackle   ABDOMEN:  Soft and non-tender, positive bowel sound without hepatosplenomegaly  BACK:  Back is straight without midline defect.    PSYCH:  Higher cortical function/mental status:  The patient is alert.  She is oriented x3 to time, place and person.  Recent and the remote memory appear normal.  The patient has a good fund of knowledge.  There is no visual or auditory hallucination or suicidal or homicidal ideation.  SPEECH:There is no gross evidence of aphasia, dysarthria or agnosia.      CRANIAL NERVES:  Pupils are 4mm, equal round reactive to light, reacting briskly to 2mm without afferent pupillary defect.  Visual fields are intact to confrontation testing.  Funduscopic examination reveals sharp disk margins with normal vasculature.  No papilledema, hemorrhages or exudates.  Extraocular movements are full and smooth with normal pursuits and saccades.  No nystagmus noted.  The face is symmetric. palate elevate symmetrically, Tongue midline, positive gag reflex. The remainder of the cranial nerves are intact and symmetrical.    MOTOR: Strength is 5/5 throughout with normal tone and bulk with the following exceptions, 4/5 intrinsic muscles of the hands and feet.  No involuntary movements noted.    Deep Tendon Reflexes: are 2/4 and symmetrical in the upper extremities, 2/4 and symmetrical at the knees and 1/4 and symmetrical at the Achilles tendon.  Plantar responses were down-going bilaterally.    SENSATION:  Intact to pinprick, light touch, vibration and proprioception.  Coordination:  The patient normally performs  finger-nose-finger, heel-to-knee-to-shin and rapid alternating movements in symmetrical fashion.    COORDINATION AND GAIT:  The patient walks with a narrow-based gait.  Patient is able to heel-toe and tandem walk forward and backwards without difficulty.  Romberg and monopedal  Romberg are negative.    MUSCULOSKELETAL: Range of motion normal, no clubbing, cyanosis, or edema.  No joint swelling.            Records Reviewed: I have personally reviewed her previous medical record.    Antonio was seen today for trigeminal neuralgia and med refill.    Diagnoses and all orders for this visit:    Trigeminal neuralgia    Other orders  -     carBAMazepine (TEGRETOL) 200 MG tablet; Take 0.5 tablets by mouth 3 (Three) Times a Day.  -     gabapentin (NEURONTIN) 300 MG capsule; Take 1 capsule by mouth 3 (Three) Times a Day.        Treatments:    Discussion:  The pain typically involves the lower face and jaw, although sometimes it affects the area around the nose and above the eye. This intense, stabbing, electric shock-like pain is caused by irritation of the trigeminal nerve, which sends branches to the forehead, cheek and lower jaw. It usually is limited to one side of the face.   The pain associated with trigeminal neuralgia represents an irritation of the nerve. The cause of the pain usually is due to contact between a healthy artery or vein and the trigeminal nerve at the base of the brain. This places pressure on the nerve as it enters the brain and causes the nerve to misfire.  Other causes of trigeminal neuralgia include pressure of a tumor on the nerve or multiple sclerosis, which damages the myelin sheaths. Development of trigeminal neuralgia in a young adult suggests the possibility of multiple sclerosis.  Symptoms   The pain is intensely sharp, throbbing and shock-like, and usually triggered by touching an area of the skin or by specific activities. Atypical pain often is present as a constant, burning sensation  "affecting a more widespread area of the face. With atypical trigeminal neuralgia, there may not be a remission period, and symptoms are usually more difficult to treat.  Trigeminal neuralgia tends to run in cycles. Patients often suffer long stretches of frequent attacks followed by weeks, months or even years of little or no pain. The usual pattern, however, is for the attacks to intensify over time with shorter pain-free periods. Some patients suffer less than one attack a day, while others experience a dozen or more every hour. The pain typically begins with a sensation of electrical shocks that culminates in an excruciating stabbing pain within less than 20 seconds. Attacks of trigeminal neuralgia may be triggered by the following:  · Touching the skin lightly  · Washing  · Shaving  · Brushing teeth  · Blowing the nose  · Drinking hot or cold beverages  · Encountering a light breeze  · Applying makeup  · Smiling  · Talking  The symptoms of several pain disorders are similar to those of trigeminal neuralgia. Temporal tendinitis involves cheek pain and tooth sensitivity, as well as headaches and neck and shoulder pain. This condition is called a \"migraine mimic\" because its symptoms are similar to those of a migraine. Petey syndrome is an injury of the styomandubular ligament, which connects the base of the skull with the lower jaw, producing pain in areas of the face, head and neck. Occipital neuralgia involves pain in the front and back of the head that sometimes extends into the facial region.  Treatment   There are several effective ways to alleviate the pain, including a variety of medications.  · Carbamazepine , Baclofen  Phenytoin , Oxcarbazepine , Other medications include gabapentin , clonazepam , sodium valporate , lamotrigine  and topiramate.  There are drawbacks to these medications other than side effects.   Surgery   If medications have proven ineffective in treating trigeminal neuralgia, there are " several surgical procedures that may help control the pain.   Stereotactic radiosurgery (through such procedures as Gamma Knife , Cyberknife , LINAC ) delivers a single highly concentrated dose of ionizing radiation to a small, precise target at the trigeminal nerve root. This treatment is noninvasive, and avoids many of the risks and complications of open surgery and other treatments. Over a period of time and as a result of radiation exposure, the slow formation of a lesion in the nerve interrupts transmission of pain signals to the brain.  Microvascular decompression    Percutaneous stereotactic rhizotomy    Percutaneous glycerol rhizotomy   Percutaneous balloon compression .  Motor cortex stimulation    The benefits of surgery should always be weighed carefully against its risks. Although a large percentage of trigeminal neuralgia patients report pain relief after surgery, there is no guarantee that surgery will help every individual.      This Document is signed by Deangelo Rosas MD, FAAN, FAASM November 13, 20182:19 PM

## 2018-11-15 ENCOUNTER — OFFICE VISIT (OUTPATIENT)
Dept: RETAIL CLINIC | Facility: CLINIC | Age: 68
End: 2018-11-15

## 2018-11-15 VITALS
RESPIRATION RATE: 18 BRPM | TEMPERATURE: 100.3 F | BODY MASS INDEX: 20.6 KG/M2 | OXYGEN SATURATION: 96 % | DIASTOLIC BLOOD PRESSURE: 75 MMHG | WEIGHT: 102 LBS | SYSTOLIC BLOOD PRESSURE: 135 MMHG | HEART RATE: 112 BPM

## 2018-11-15 DIAGNOSIS — T50.B95A INFLUENZA VACCINE SIDE EFFECT, INITIAL ENCOUNTER: Primary | ICD-10-CM

## 2018-11-15 DIAGNOSIS — R50.9 DEHYDRATION FEVER: ICD-10-CM

## 2018-11-15 LAB
EXPIRATION DATE: NORMAL
FLUAV AG NPH QL: NEGATIVE
FLUBV AG NPH QL: NEGATIVE
INTERNAL CONTROL: NORMAL
Lab: NORMAL

## 2018-11-15 PROCEDURE — 87804 INFLUENZA ASSAY W/OPTIC: CPT | Performed by: NURSE PRACTITIONER

## 2018-11-15 PROCEDURE — 99213 OFFICE O/P EST LOW 20 MIN: CPT | Performed by: NURSE PRACTITIONER

## 2018-11-15 RX ORDER — BENZONATATE 200 MG/1
200 CAPSULE ORAL 3 TIMES DAILY PRN
Qty: 21 CAPSULE | Refills: 0 | Status: SHIPPED | OUTPATIENT
Start: 2018-11-15 | End: 2018-11-22

## 2018-11-15 NOTE — PATIENT INSTRUCTIONS
Fever, Adult  A fever is an increase in the body’s temperature. It is often defined as a temperature of 100° F (38°C) or higher. Short mild or moderate fevers often have no long-term effects. They also often do not need treatment. Moderate or high fevers may make you feel uncomfortable. Sometimes, they can also be a sign of a serious illness or disease. The sweating that may happen with repeated fevers or fevers that last a while may also cause you to not have enough fluid in your body (dehydration).  You can take your temperature with a thermometer to see if you have a fever. A measured temperature can change with:  · Age.  · Time of day.  · Where the thermometer is placed:  ? Mouth (oral).  ? Rectum (rectal).  ? Ear (tympanic).  ? Underarm (axillary).  ? Forehead (temporal).    Follow these instructions at home:  Pay attention to any changes in your symptoms. Take these actions to help with your condition:  · Take over-the-counter and prescription medicines only as told by your doctor. Follow the dosing instructions carefully.  · If you were prescribed an antibiotic medicine, take it as told by your doctor. Do not stop taking the antibiotic even if you start to feel better.  · Rest as needed.  · Drink enough fluid to keep your pee (urine) clear or pale yellow.  · Sponge yourself or bathe with room-temperature water as needed. This helps to lower your body temperature . Do not use ice water.  · Do not wear too many blankets or heavy clothes.    Contact a doctor if:  · You throw up (vomit).  · You cannot eat or drink without throwing up.  · You have watery poop (diarrhea).  · It hurts when you pee.  · Your symptoms do not get better with treatment.  · You have new symptoms.  · You feel very weak.  Get help right away if:  · You are short of breath or have trouble breathing.  · You are dizzy or you pass out (faint).  · You feel confused.  · You have signs of not having enough fluid in your body, such as:  ? A dry  mouth.  ? Peeing less.  ? Looking pale.  · You have very bad pain in your belly (abdomen).  · You keep throwing up or having water poop.  · You have a skin rash.  · Your symptoms suddenly get worse.  This information is not intended to replace advice given to you by your health care provider. Make sure you discuss any questions you have with your health care provider.  Document Released: 09/26/2009 Document Revised: 05/25/2017 Document Reviewed: 02/11/2016  Elsevier Interactive Patient Education © 2018 [employer]

## 2018-11-15 NOTE — PROGRESS NOTES
URI      Subjective   Antonio Barnes is a 68 y.o. female.     URI    This is a new problem. The current episode started yesterday. The problem has been gradually worsening. There has been no fever. Associated symptoms include congestion, coughing, diarrhea, headaches, rhinorrhea, sneezing and a sore throat. Pertinent negatives include no abdominal pain, ear pain, nausea, sinus pain, vomiting or wheezing. Treatments tried: Tylenol (last dose: last night)  The treatment provided no relief.    Had influenza vaccine 11/13/2018.   recently passed 6 weeks ago.  Has not been watching her diet as well or drinking much fluids.  See ROS.      Patient Active Problem List   Diagnosis   • Anemia   • Constipation   • Depression   • Fatigue   • Gastroesophageal reflux disease   • Hypertension   • Low back pain   • Neck pain   • Osteoporosis   • Thalassemia   • Trigeminal neuralgia   • Vitamin D deficiency       Allergies   Allergen Reactions   • Ace Inhibitors Cough   • Penicillins Other (See Comments)     Childhood allergy         Current Outpatient Medications on File Prior to Visit   Medication Sig Dispense Refill   • amitriptyline (ELAVIL) 10 MG tablet Take 1.5 tablet daily at dinner. 135 tablet 1   • amLODIPine (NORVASC) 10 MG tablet Take 1 tablet by mouth Daily. as directed 90 tablet 1   • atorvastatin (LIPITOR) 40 MG tablet Take 1 tablet by mouth Every Night. 90 tablet 1   • B Complex Vitamins (VITAMIN B COMPLEX) tablet Take 1 tablet by mouth daily.     • calcitonin, salmon, (MIACALCIN) 200 UNIT/ACT nasal spray 1 spray into each nostril Daily. 1 each 12   • calcium-vitamin D (OSCAL-500) 500-200 MG-UNIT per tablet Take 1 tablet by mouth 2 (two) times a day.     • carBAMazepine (TEGRETOL) 200 MG tablet Take 0.5 tablets by mouth 3 (Three) Times a Day. 270 tablet 1   • Cholecalciferol (VITAMIN D3) 1000 UNITS capsule Take 1 capsule by mouth daily.     • fenofibrate micronized (LOFIBRA) 134 MG capsule Take 1 capsule by  mouth Every Other Day. 45 capsule 3   • ferrous gluconate (FERGON) 324 MG tablet Take 1 tablet by mouth Daily With Breakfast. 90 tablet 1   • gabapentin (NEURONTIN) 100 MG capsule Take 1 capsule twice daily (at lunch and at bedtime) PRN facial pain 180 capsule 3   • gabapentin (NEURONTIN) 300 MG capsule Take 1 capsule by mouth 3 (Three) Times a Day. 270 capsule 1   • Omega-3 Fatty Acids (FISH OIL PO) 1000 mg twice daily.     • omeprazole (priLOSEC) 20 MG capsule Take 1 capsule by mouth Daily. 90 capsule 1   • sertraline (ZOLOFT) 50 MG tablet TAKE 1 TABLET EVERY DAY 90 tablet 3     No current facility-administered medications on file prior to visit.        Past Medical History:   Diagnosis Date   • Anemia    • Breast cancer (CMS/HCC) 1994    RIGHT   • Esophagitis, reflux    • High cholesterol    • History of colonoscopy 2010    (Fiberoptic)   • History of mammogram    • History of Papanicolaou smear of cervix     History of reported pap smear   • Osteoarthritis    • Peptic ulcer    • Trigeminal neuralgia    • Urethral stricture due to infection        Past Surgical History:   Procedure Laterality Date   • APPENDECTOMY     • BREAST BIOPSY Right 1994   • COLONOSCOPY      Colonoscopy (Fiberoptic)   • MASTECTOMY Right 1994   • TONSILLECTOMY     • TONSILLECTOMY         Family History   Problem Relation Age of Onset   • Hypertension Mother    • Kidney disease Mother    • Heart attack Father    • Breast cancer Maternal Aunt         AGE UNKNOWN   • Ovarian cancer Neg Hx        Social History     Socioeconomic History   • Marital status:      Spouse name: Not on file   • Number of children: Not on file   • Years of education: Not on file   • Highest education level: Not on file   Social Needs   • Financial resource strain: Not on file   • Food insecurity - worry: Not on file   • Food insecurity - inability: Not on file   • Transportation needs - medical: Not on file   • Transportation needs - non-medical: Not on file    Occupational History   • Not on file   Tobacco Use   • Smoking status: Former Smoker     Packs/day: 1.00     Years: 20.00     Pack years: 20.00     Types: Cigarettes     Last attempt to quit: 2007     Years since quittin.8   • Smokeless tobacco: Never Used   • Tobacco comment: Quit smoking 2007   Substance and Sexual Activity   • Alcohol use: No   • Drug use: No   • Sexual activity: Not Currently     Birth control/protection: Abstinence   Other Topics Concern   • Not on file   Social History Narrative   • Not on file       Travel:  No recent travel within the last 21 days outside the [place] Denies recent travel to one of the following [place]ries:  [place], [place], [place], or [place].  Denies contact with anyone who has traveled to one of the following [place]  Countries: [place], [place], [place], or [place] within the last 21 days and is known or suspected to have Ebola.  Denies having had any contact with the human remains, blood or any bodily fluids of someone who is known or suspected to have Ebola within the last 21 days.     OB History      Para Term  AB Living    1 1 1 0 0 0    SAB TAB Ectopic Molar Multiple Live Births    0 0 0   0            Review of Systems   Constitutional: Positive for appetite change (decreased ), chills and diaphoresis. Negative for fever.   HENT: Positive for congestion, postnasal drip, rhinorrhea, sneezing and sore throat. Negative for ear discharge, ear pain, mouth sores, nosebleeds, sinus pressure, sinus pain and voice change.    Respiratory: Positive for cough. Negative for shortness of breath and wheezing.    Gastrointestinal: Positive for diarrhea. Negative for abdominal pain, nausea and vomiting.   Musculoskeletal: Positive for myalgias.   Neurological: Positive for headaches. Negative for dizziness and light-headedness.       /75 (BP Location: Left arm, Patient Position: Sitting, Cuff Size: Adult)   Pulse 112   Temp 100.3 °F  (37.9 °C) (Temporal)   Resp 18   Wt 46.3 kg (102 lb)   LMP  (LMP Unknown)   SpO2 96%   Breastfeeding? No   BMI 20.60 kg/m²     Objective   Physical Exam   Constitutional: She is oriented to person, place, and time. She appears well-developed and well-nourished. She is cooperative. She appears ill. No distress.   HENT:   Head: Normocephalic.   Right Ear: Tympanic membrane, external ear and ear canal normal.   Left Ear: Tympanic membrane, external ear and ear canal normal.   Nose: Right sinus exhibits no maxillary sinus tenderness and no frontal sinus tenderness. Left sinus exhibits no maxillary sinus tenderness and no frontal sinus tenderness.   Mouth/Throat: Uvula is midline. Mucous membranes are dry. No posterior oropharyngeal edema or posterior oropharyngeal erythema. Tonsils are 0 on the right. Tonsils are 0 on the left. No tonsillar exudate.   Mild nasal congestion    Cardiovascular: Regular rhythm and normal heart sounds. Tachycardia present.   Pulmonary/Chest: Effort normal and breath sounds normal. No stridor. She has no decreased breath sounds. She has no wheezes. She has no rhonchi. She has no rales.   Abdominal: Soft. Bowel sounds are normal. There is no hepatosplenomegaly. There is no tenderness. There is no rigidity, no rebound, no guarding and no CVA tenderness.   Lymphadenopathy:        Head (right side): No tonsillar adenopathy present.        Head (left side): No tonsillar adenopathy present.     She has no cervical adenopathy.   Neurological: She is alert and oriented to person, place, and time.   Skin: Skin is warm and dry. No rash noted.   Poor skin turgor    Psychiatric: Her speech is normal and behavior is normal. Judgment and thought content normal. Cognition and memory are normal.   Tearful at times       Assessment/Plan   Antonio was seen today for uri.    Diagnoses and all orders for this visit:    Influenza vaccine side effect, initial encounter  -     benzonatate (TESSALON) 200 MG  capsule; Take 1 capsule by mouth 3 (Three) Times a Day As Needed for Cough for up to 7 days.  -     POC Influenza A / B    Dehydration fever    Increase water intake to at least 64 ounces per day.  Rest.  Encouraged self care.  Patient tearful; offered support.  Okay to take Tylenol and/or Motrin when she increases her water intake to avoid acute liver/acute kidney injury.  Follow up with PCP if symptoms do not improve.  Visit summary provided.  Questions/concerns addressed.  Test results reviewed today.      Results for orders placed or performed in visit on 11/15/18   POC Influenza A / B   Result Value Ref Range    Rapid Influenza A Ag negative     Rapid Influenza B Ag negative     Internal Control Passed Passed    Lot Number [id number]     Expiration Date 6/30/2020        Return with PCP if symptoms worsen.  .

## 2018-11-17 ENCOUNTER — OFFICE VISIT (OUTPATIENT)
Dept: RETAIL CLINIC | Facility: CLINIC | Age: 68
End: 2018-11-17

## 2018-11-17 VITALS
RESPIRATION RATE: 16 BRPM | WEIGHT: 100 LBS | TEMPERATURE: 99.2 F | OXYGEN SATURATION: 98 % | DIASTOLIC BLOOD PRESSURE: 78 MMHG | SYSTOLIC BLOOD PRESSURE: 142 MMHG | HEART RATE: 84 BPM | BODY MASS INDEX: 20.2 KG/M2

## 2018-11-17 DIAGNOSIS — J06.9 ACUTE URI: ICD-10-CM

## 2018-11-17 DIAGNOSIS — J02.0 STREP PHARYNGITIS: Primary | ICD-10-CM

## 2018-11-17 LAB
EXPIRATION DATE: ABNORMAL
INTERNAL CONTROL: ABNORMAL
Lab: ABNORMAL
S PYO AG THROAT QL: POSITIVE

## 2018-11-17 PROCEDURE — 87880 STREP A ASSAY W/OPTIC: CPT | Performed by: NURSE PRACTITIONER

## 2018-11-17 PROCEDURE — 99213 OFFICE O/P EST LOW 20 MIN: CPT | Performed by: NURSE PRACTITIONER

## 2018-11-17 RX ORDER — DEXTROMETHORPHAN HYDROBROMIDE AND PROMETHAZINE HYDROCHLORIDE 15; 6.25 MG/5ML; MG/5ML
5 SYRUP ORAL 4 TIMES DAILY PRN
Qty: 100 ML | Refills: 0 | Status: SHIPPED | OUTPATIENT
Start: 2018-11-17 | End: 2018-11-20

## 2018-11-17 RX ORDER — AZITHROMYCIN 250 MG/1
TABLET, FILM COATED ORAL
Qty: 6 TABLET | Refills: 0 | Status: SHIPPED | OUTPATIENT
Start: 2018-11-17 | End: 2019-01-11

## 2018-11-17 NOTE — PROGRESS NOTES
Subjective   Antonio Barnes is a 68 y.o. female.     Sore Throat    This is a new problem. Episode onset: 4 days ago. The problem has been gradually worsening. Neither side of throat is experiencing more pain than the other. Maximum temperature: up to 100.2. The pain is at a severity of 9/10. Associated symptoms include congestion, coughing, headaches and a hoarse voice. Pertinent negatives include no abdominal pain, diarrhea, drooling, ear discharge, ear pain, plugged ear sensation, neck pain, shortness of breath, stridor, swollen glands, trouble swallowing or vomiting. She has had exposure to strep. Treatments tried: increased water, tylenol, motrin, tessalon. The treatment provided no relief.        Current Outpatient Medications on File Prior to Visit   Medication Sig Dispense Refill   • amitriptyline (ELAVIL) 10 MG tablet Take 1.5 tablet daily at dinner. 135 tablet 1   • amLODIPine (NORVASC) 10 MG tablet Take 1 tablet by mouth Daily. as directed 90 tablet 1   • atorvastatin (LIPITOR) 40 MG tablet Take 1 tablet by mouth Every Night. 90 tablet 1   • B Complex Vitamins (VITAMIN B COMPLEX) tablet Take 1 tablet by mouth daily.     • benzonatate (TESSALON) 200 MG capsule Take 1 capsule by mouth 3 (Three) Times a Day As Needed for Cough for up to 7 days. 21 capsule 0   • calcitonin, salmon, (MIACALCIN) 200 UNIT/ACT nasal spray 1 spray into each nostril Daily. 1 each 12   • calcium-vitamin D (OSCAL-500) 500-200 MG-UNIT per tablet Take 1 tablet by mouth 2 (two) times a day.     • carBAMazepine (TEGRETOL) 200 MG tablet Take 0.5 tablets by mouth 3 (Three) Times a Day. 270 tablet 1   • Cholecalciferol (VITAMIN D3) 1000 UNITS capsule Take 1 capsule by mouth daily.     • fenofibrate micronized (LOFIBRA) 134 MG capsule Take 1 capsule by mouth Every Other Day. 45 capsule 3   • ferrous gluconate (FERGON) 324 MG tablet Take 1 tablet by mouth Daily With Breakfast. 90 tablet 1   • gabapentin (NEURONTIN) 100 MG capsule Take 1  capsule twice daily (at lunch and at bedtime) PRN facial pain 180 capsule 3   • gabapentin (NEURONTIN) 300 MG capsule Take 1 capsule by mouth 3 (Three) Times a Day. 270 capsule 1   • Omega-3 Fatty Acids (FISH OIL PO) 1000 mg twice daily.     • omeprazole (priLOSEC) 20 MG capsule Take 1 capsule by mouth Daily. 90 capsule 1   • sertraline (ZOLOFT) 50 MG tablet TAKE 1 TABLET EVERY DAY 90 tablet 3     No current facility-administered medications on file prior to visit.        Allergies   Allergen Reactions   • Ace Inhibitors Cough   • Penicillins Other (See Comments)     Childhood allergy        Past Medical History:   Diagnosis Date   • Anemia    • Breast cancer (CMS/HCC) 1994    RIGHT   • Esophagitis, reflux    • High cholesterol    • History of colonoscopy 2010    (Fiberoptic)   • History of mammogram    • History of Papanicolaou smear of cervix     History of reported pap smear   • Osteoarthritis    • Peptic ulcer    • Trigeminal neuralgia    • Urethral stricture due to infection        Past Surgical History:   Procedure Laterality Date   • APPENDECTOMY     • BREAST BIOPSY Right 1994   • COLONOSCOPY      Colonoscopy (Fiberoptic)   • MASTECTOMY Right 1994   • TONSILLECTOMY     • TONSILLECTOMY         Family History   Problem Relation Age of Onset   • Hypertension Mother    • Kidney disease Mother    • Heart attack Father    • Breast cancer Maternal Aunt         AGE UNKNOWN   • Ovarian cancer Neg Hx        Social History     Socioeconomic History   • Marital status:      Spouse name: Not on file   • Number of children: Not on file   • Years of education: Not on file   • Highest education level: Not on file   Social Needs   • Financial resource strain: Not on file   • Food insecurity - worry: Not on file   • Food insecurity - inability: Not on file   • Transportation needs - medical: Not on file   • Transportation needs - non-medical: Not on file   Occupational History   • Not on file   Tobacco Use   • Smoking  status: Former Smoker     Packs/day: 1.00     Years: 20.00     Pack years: 20.00     Types: Cigarettes     Last attempt to quit:      Years since quittin.8   • Smokeless tobacco: Never Used   • Tobacco comment: Quit smoking 2007   Substance and Sexual Activity   • Alcohol use: No   • Drug use: No   • Sexual activity: Not Currently     Birth control/protection: Abstinence   Other Topics Concern   • Not on file   Social History Narrative   • Not on file       Review of Systems   Constitutional: Positive for activity change, appetite change, chills, diaphoresis and fever.   HENT: Positive for congestion, hoarse voice, rhinorrhea and sore throat. Negative for drooling, ear discharge, ear pain and trouble swallowing.    Respiratory: Positive for cough. Negative for shortness of breath and stridor.    Cardiovascular: Negative for chest pain.   Gastrointestinal: Negative for abdominal pain, diarrhea, nausea and vomiting.   Musculoskeletal: Negative for neck pain.   Skin: Negative for rash.   Neurological: Positive for headaches.       /78   Pulse 84   Temp 99.2 °F (37.3 °C)   Resp 16   Wt 45.4 kg (100 lb)   LMP  (LMP Unknown)   SpO2 98%   BMI 20.20 kg/m²     Objective   Physical Exam   Constitutional: She is oriented to person, place, and time. She appears well-developed and well-nourished. She is cooperative.   HENT:   Head: Normocephalic.   Right Ear: Tympanic membrane, external ear and ear canal normal.   Left Ear: Tympanic membrane, external ear and ear canal normal.   Nose: Rhinorrhea present. Right sinus exhibits no maxillary sinus tenderness and no frontal sinus tenderness. Left sinus exhibits no maxillary sinus tenderness and no frontal sinus tenderness.   Mouth/Throat: Uvula is midline. Oropharyngeal exudate and posterior oropharyngeal erythema present. Tonsils are 0 on the right. Tonsils are 0 on the left. No tonsillar exudate.       Cardiovascular: Regular rhythm and normal heart  sounds.   Pulmonary/Chest: Effort normal and breath sounds normal.   Lymphadenopathy:     She has cervical adenopathy.   Neurological: She is alert and oriented to person, place, and time.   Skin: Skin is warm and dry. No rash noted.   Psychiatric: Her speech is normal and behavior is normal. Judgment and thought content normal. Cognition and memory are normal.         Assessment/Plan   Antonio was seen today for sore throat.    Diagnoses and all orders for this visit:    Strep pharyngitis  -     azithromycin (ZITHROMAX Z-TRACY) 250 MG tablet; Take 2 tablets the first day, then 1 tablet daily for 4 days.  -     POCT rapid strep A    Acute URI  -     promethazine-dextromethorphan (PROMETHAZINE-DM) 6.25-15 MG/5ML syrup; Take 5 mL by mouth 4 (Four) Times a Day As Needed for Cough for up to 5 days.        Results for orders placed or performed in visit on 11/17/18   POCT rapid strep A   Result Value Ref Range    Rapid Strep A Screen Positive (A) Negative, VALID, INVALID, Not Performed    Internal Control Passed Passed    Lot Number [id number]     Expiration Date 4/20        Follow up with PCP or go to the nearest emergency room if symptoms worsen or fail to improve.

## 2018-11-17 NOTE — PATIENT INSTRUCTIONS
"Upper Respiratory Infection, Adult  Most upper respiratory infections (URIs) are a viral infection of the air passages leading to the lungs. A URI affects the nose, throat, and upper air passages. The most common type of URI is nasopharyngitis and is typically referred to as \"the common cold.\"  URIs run their course and usually go away on their own. Most of the time, a URI does not require medical attention, but sometimes a bacterial infection in the upper airways can follow a viral infection. This is called a secondary infection. Sinus and middle ear infections are common types of secondary upper respiratory infections.  Bacterial pneumonia can also complicate a URI. A URI can worsen asthma and chronic obstructive pulmonary disease (COPD). Sometimes, these complications can require emergency medical care and may be life threatening.  What are the causes?  Almost all URIs are caused by viruses. A virus is a type of germ and can spread from one person to another.  What increases the risk?  You may be at risk for a URI if:  · You smoke.  · You have chronic heart or lung disease.  · You have a weakened defense (immune) system.  · You are very young or very old.  · You have nasal allergies or asthma.  · You work in crowded or poorly ventilated areas.  · You work in health care facilities or schools.    What are the signs or symptoms?  Symptoms typically develop 2-3 days after you come in contact with a cold virus. Most viral URIs last 7-10 days. However, viral URIs from the influenza virus (flu virus) can last 14-18 days and are typically more severe. Symptoms may include:  · Runny or stuffy (congested) nose.  · Sneezing.  · Cough.  · Sore throat.  · Headache.  · Fatigue.  · Fever.  · Loss of appetite.  · Pain in your forehead, behind your eyes, and over your cheekbones (sinus pain).  · Muscle aches.    How is this diagnosed?  Your health care provider may diagnose a URI by:  · Physical exam.  · Tests to check that your " symptoms are not due to another condition such as:  ? Strep throat.  ? Sinusitis.  ? Pneumonia.  ? Asthma.    How is this treated?  A URI goes away on its own with time. It cannot be cured with medicines, but medicines may be prescribed or recommended to relieve symptoms. Medicines may help:  · Reduce your fever.  · Reduce your cough.  · Relieve nasal congestion.    Follow these instructions at home:  · Take medicines only as directed by your health care provider.  · Gargle warm saltwater or take cough drops to comfort your throat as directed by your health care provider.  · Use a warm mist humidifier or inhale steam from a shower to increase air moisture. This may make it easier to breathe.  · Drink enough fluid to keep your urine clear or pale yellow.  · Eat soups and other clear broths and maintain good nutrition.  · Rest as needed.  · Return to work when your temperature has returned to normal or as your health care provider advises. You may need to stay home longer to avoid infecting others. You can also use a face mask and careful hand washing to prevent spread of the virus.  · Increase the usage of your inhaler if you have asthma.  · Do not use any tobacco products, including cigarettes, chewing tobacco, or electronic cigarettes. If you need help quitting, ask your health care provider.  How is this prevented?  The best way to protect yourself from getting a cold is to practice good hygiene.  · Avoid oral or hand contact with people with cold symptoms.  · Wash your hands often if contact occurs.    There is no clear evidence that vitamin C, vitamin E, echinacea, or exercise reduces the chance of developing a cold. However, it is always recommended to get plenty of rest, exercise, and practice good nutrition.  Contact a health care provider if:  · You are getting worse rather than better.  · Your symptoms are not controlled by medicine.  · You have chills.  · You have worsening shortness of breath.  · You have  brown or red mucus.  · You have yellow or brown nasal discharge.  · You have pain in your face, especially when you bend forward.  · You have a fever.  · You have swollen neck glands.  · You have pain while swallowing.  · You have white areas in the back of your throat.  Get help right away if:  · You have severe or persistent:  ? Headache.  ? Ear pain.  ? Sinus pain.  ? Chest pain.  · You have chronic lung disease and any of the following:  ? Wheezing.  ? Prolonged cough.  ? Coughing up blood.  ? A change in your usual mucus.  · You have a stiff neck.  · You have changes in your:  ? Vision.  ? Hearing.  ? Thinking.  ? Mood.  This information is not intended to replace advice given to you by your health care provider. Make sure you discuss any questions you have with your health care provider.  Document Released: 06/13/2002 Document Revised: 08/20/2017 Document Reviewed: 03/25/2015  [employer] Interactive Patient Education © 2018 [employer]  Strep Throat  Strep throat is a bacterial infection of the throat. Your health care provider may call the infection tonsillitis or pharyngitis, depending on whether there is swelling in the tonsils or at the back of the throat. Strep throat is most common during the cold months of the year in children who are 5-15 years of age, but it can happen during any season in people of any age. This infection is spread from person to person (contagious) through coughing, sneezing, or close contact.  What are the causes?  Strep throat is caused by the bacteria called Streptococcus pyogenes.  What increases the risk?  This condition is more likely to develop in:  · People who spend time in crowded places where the infection can spread easily.  · People who have close contact with someone who has strep throat.    What are the signs or symptoms?  Symptoms of this condition include:  · Fever or chills.  · Redness, swelling, or pain in the tonsils or throat.  · Pain or difficulty when  swallowing.  · White or yellow spots on the tonsils or throat.  · Swollen, tender glands in the neck or under the jaw.  · Red rash all over the body (rare).    How is this diagnosed?  This condition is diagnosed by performing a rapid strep test or by taking a swab of your throat (throat culture test). Results from a rapid strep test are usually ready in a few minutes, but throat culture test results are available after one or two days.  How is this treated?  This condition is treated with antibiotic medicine.  Follow these instructions at home:  Medicines  · Take over-the-counter and prescription medicines only as told by your health care provider.  · Take your antibiotic as told by your health care provider. Do not stop taking the antibiotic even if you start to feel better.  · Have family members who also have a sore throat or fever tested for strep throat. They may need antibiotics if they have the strep infection.  Eating and drinking  · Do not share food, drinking cups, or personal items that could cause the infection to spread to other people.  · If swallowing is difficult, try eating soft foods until your sore throat feels better.  · Drink enough fluid to keep your urine clear or pale yellow.  General instructions  · Gargle with a salt-water mixture 3-4 times per day or as needed. To make a salt-water mixture, completely dissolve ½-1 tsp of salt in 1 cup of warm water.  · Make sure that all household members wash their hands well.  · Get plenty of rest.  · Stay home from school or work until you have been taking antibiotics for 24 hours.  · Keep all follow-up visits as told by your health care provider. This is important.  Contact a health care provider if:  · The glands in your neck continue to get bigger.  · You develop a rash, cough, or earache.  · You cough up a thick liquid that is green, yellow-brown, or bloody.  · You have pain or discomfort that does not get better with medicine.  · Your problems seem  to be getting worse rather than better.  · You have a fever.  Get help right away if:  · You have new symptoms, such as vomiting, severe headache, stiff or painful neck, chest pain, or shortness of breath.  · You have severe throat pain, drooling, or changes in your voice.  · You have swelling of the neck, or the skin on the neck becomes red and tender.  · You have signs of dehydration, such as fatigue, dry mouth, and decreased urination.  · You become increasingly sleepy, or you cannot wake up completely.  · Your joints become red or painful.  This information is not intended to replace advice given to you by your health care provider. Make sure you discuss any questions you have with your health care provider.  Document Released: 12/15/2001 Document Revised: 08/16/2017 Document Reviewed: 04/11/2016  [employer] Interactive Patient Education © 2018 [employer]

## 2018-11-19 RX ORDER — GABAPENTIN 100 MG/1
CAPSULE ORAL
Qty: 180 CAPSULE | Refills: 3 | OUTPATIENT
Start: 2018-11-19

## 2018-11-20 ENCOUNTER — OFFICE VISIT (OUTPATIENT)
Dept: RETAIL CLINIC | Facility: CLINIC | Age: 68
End: 2018-11-20

## 2018-11-20 ENCOUNTER — TELEPHONE (OUTPATIENT)
Dept: RETAIL CLINIC | Facility: CLINIC | Age: 68
End: 2018-11-20

## 2018-11-20 VITALS
DIASTOLIC BLOOD PRESSURE: 72 MMHG | OXYGEN SATURATION: 98 % | BODY MASS INDEX: 20.2 KG/M2 | WEIGHT: 100 LBS | TEMPERATURE: 99.5 F | SYSTOLIC BLOOD PRESSURE: 124 MMHG | HEART RATE: 89 BPM | RESPIRATION RATE: 18 BRPM

## 2018-11-20 DIAGNOSIS — J20.9 ACUTE BRONCHITIS, UNSPECIFIED ORGANISM: Primary | ICD-10-CM

## 2018-11-20 PROCEDURE — 96372 THER/PROPH/DIAG INJ SC/IM: CPT | Performed by: NURSE PRACTITIONER

## 2018-11-20 PROCEDURE — 99213 OFFICE O/P EST LOW 20 MIN: CPT | Performed by: NURSE PRACTITIONER

## 2018-11-20 PROCEDURE — 94640 AIRWAY INHALATION TREATMENT: CPT | Performed by: NURSE PRACTITIONER

## 2018-11-20 RX ORDER — PROMETHAZINE HYDROCHLORIDE 12.5 MG/1
12.5 TABLET ORAL EVERY 6 HOURS PRN
Qty: 16 TABLET | Refills: 0 | Status: SHIPPED | OUTPATIENT
Start: 2018-11-20 | End: 2018-11-24

## 2018-11-20 RX ORDER — ALBUTEROL SULFATE 90 UG/1
2 AEROSOL, METERED RESPIRATORY (INHALATION) EVERY 4 HOURS PRN
Qty: 1 INHALER | Refills: 0 | Status: SHIPPED | OUTPATIENT
Start: 2018-11-20 | End: 2018-11-27

## 2018-11-20 RX ORDER — DEXAMETHASONE SODIUM PHOSPHATE 10 MG/ML
10 INJECTION INTRAMUSCULAR; INTRAVENOUS ONCE
Status: COMPLETED | OUTPATIENT
Start: 2018-11-20 | End: 2018-11-20

## 2018-11-20 RX ORDER — ALBUTEROL SULFATE 2.5 MG/3ML
2.5 SOLUTION RESPIRATORY (INHALATION) EVERY 6 HOURS PRN
Status: SHIPPED | OUTPATIENT
Start: 2018-11-20

## 2018-11-20 RX ADMIN — ALBUTEROL SULFATE 2.5 MG: 2.5 SOLUTION RESPIRATORY (INHALATION) at 10:10

## 2018-11-20 RX ADMIN — DEXAMETHASONE SODIUM PHOSPHATE 10 MG: 10 INJECTION INTRAMUSCULAR; INTRAVENOUS at 10:10

## 2018-11-20 NOTE — PROGRESS NOTES
Cough      Subjective   Antonio Barnes is a 68 y.o. female.     Cough   This is a recurrent problem. Episode onset: 5 days  The problem has been gradually worsening. The cough is productive of purulent sputum. Associated symptoms include chest pain (right rib pain ), a fever, headaches, shortness of breath and wheezing. Pertinent negatives include no chills, ear pain, myalgias, postnasal drip, rash, rhinorrhea or sore throat. The symptoms are aggravated by lying down (activity ). Treatments tried: Azithromycin x 3 days, cough medicine, Mucinex. The treatment provided no relief.    Has had influenza vaccine.  See ROS.      Patient Active Problem List   Diagnosis   • Anemia   • Constipation   • Depression   • Fatigue   • Gastroesophageal reflux disease   • Hypertension   • Low back pain   • Neck pain   • Osteoporosis   • Thalassemia   • Trigeminal neuralgia   • Vitamin D deficiency       Allergies   Allergen Reactions   • Ace Inhibitors Cough   • Penicillins Other (See Comments)     Childhood allergy         Current Outpatient Medications on File Prior to Visit   Medication Sig Dispense Refill   • amitriptyline (ELAVIL) 10 MG tablet Take 1.5 tablet daily at dinner. 135 tablet 1   • amLODIPine (NORVASC) 10 MG tablet Take 1 tablet by mouth Daily. as directed 90 tablet 1   • atorvastatin (LIPITOR) 40 MG tablet Take 1 tablet by mouth Every Night. 90 tablet 1   • azithromycin (ZITHROMAX Z-TRACY) 250 MG tablet Take 2 tablets the first day, then 1 tablet daily for 4 days. 6 tablet 0   • B Complex Vitamins (VITAMIN B COMPLEX) tablet Take 1 tablet by mouth daily.     • benzonatate (TESSALON) 200 MG capsule Take 1 capsule by mouth 3 (Three) Times a Day As Needed for Cough for up to 7 days. 21 capsule 0   • calcitonin, salmon, (MIACALCIN) 200 UNIT/ACT nasal spray 1 spray into each nostril Daily. 1 each 12   • calcium-vitamin D (OSCAL-500) 500-200 MG-UNIT per tablet Take 1 tablet by mouth 2 (two) times a day.     • carBAMazepine  (TEGRETOL) 200 MG tablet Take 0.5 tablets by mouth 3 (Three) Times a Day. 270 tablet 1   • Cholecalciferol (VITAMIN D3) 1000 UNITS capsule Take 1 capsule by mouth daily.     • fenofibrate micronized (LOFIBRA) 134 MG capsule Take 1 capsule by mouth Every Other Day. 45 capsule 3   • gabapentin (NEURONTIN) 100 MG capsule Take 1 capsule twice daily (at lunch and at bedtime) PRN facial pain 180 capsule 3   • gabapentin (NEURONTIN) 300 MG capsule Take 1 capsule by mouth 3 (Three) Times a Day. 270 capsule 1   • Omega-3 Fatty Acids (FISH OIL PO) 1000 mg twice daily.     • omeprazole (priLOSEC) 20 MG capsule Take 1 capsule by mouth Daily. 90 capsule 1   • sertraline (ZOLOFT) 50 MG tablet TAKE 1 TABLET EVERY DAY 90 tablet 3   • ferrous gluconate (FERGON) 324 MG tablet Take 1 tablet by mouth Daily With Breakfast. 90 tablet 1   • [DISCONTINUED] promethazine-dextromethorphan (PROMETHAZINE-DM) 6.25-15 MG/5ML syrup Take 5 mL by mouth 4 (Four) Times a Day As Needed for Cough for up to 5 days. 100 mL 0     No current facility-administered medications on file prior to visit.        Past Medical History:   Diagnosis Date   • Anemia    • Breast cancer (CMS/HCC) 1994    RIGHT   • Esophagitis, reflux    • High cholesterol    • History of colonoscopy 2010    (Fiberoptic)   • History of mammogram    • History of Papanicolaou smear of cervix     History of reported pap smear   • Osteoarthritis    • Peptic ulcer    • Trigeminal neuralgia    • Urethral stricture due to infection        Past Surgical History:   Procedure Laterality Date   • APPENDECTOMY     • BREAST BIOPSY Right 1994   • COLONOSCOPY      Colonoscopy (Fiberoptic)   • MASTECTOMY Right 1994   • TONSILLECTOMY     • TONSILLECTOMY         Family History   Problem Relation Age of Onset   • Hypertension Mother    • Kidney disease Mother    • Heart attack Father    • Breast cancer Maternal Aunt         AGE UNKNOWN   • Ovarian cancer Neg Hx        Social History     Socioeconomic  History   • Marital status:      Spouse name: Not on file   • Number of children: Not on file   • Years of education: Not on file   • Highest education level: Not on file   Social Needs   • Financial resource strain: Not on file   • Food insecurity - worry: Not on file   • Food insecurity - inability: Not on file   • Transportation needs - medical: Not on file   • Transportation needs - non-medical: Not on file   Occupational History   • Not on file   Tobacco Use   • Smoking status: Former Smoker     Packs/day: 1.00     Years: 20.00     Pack years: 20.00     Types: Cigarettes     Last attempt to quit:      Years since quittin.8   • Smokeless tobacco: Never Used   • Tobacco comment: Quit smoking 2007   Substance and Sexual Activity   • Alcohol use: No   • Drug use: No   • Sexual activity: Not Currently     Birth control/protection: Abstinence   Other Topics Concern   • Not on file   Social History Narrative   • Not on file       Travel:  No recent travel within the last 21 days outside the [place] Denies recent travel to one of the following [place]  Countries:  [place], [place], [place], or [place].  Denies contact with anyone who has traveled to one of the following [place]  Countries: [place], [place], [place], or [place] within the last 21 days and is known or suspected to have Ebola.  Denies having had any contact with the human remains, blood or any bodily fluids of someone who is known or suspected to have Ebola within the last 21 days.     OB History      Para Term  AB Living    1 1 1 0 0 0    SAB TAB Ectopic Molar Multiple Live Births    0 0 0   0            Review of Systems   Constitutional: Positive for fever. Negative for chills and diaphoresis.   HENT: Negative for congestion, ear discharge, ear pain, mouth sores, nosebleeds, postnasal drip, rhinorrhea, sinus pressure, sinus pain, sneezing, sore throat and voice change.    Respiratory: Positive for cough, chest  tightness, shortness of breath and wheezing.    Cardiovascular: Positive for chest pain (right rib pain ).   Gastrointestinal: Negative for abdominal pain, diarrhea, nausea and vomiting.   Musculoskeletal: Negative for myalgias.   Skin: Negative for rash.   Neurological: Positive for dizziness and headaches.       /72 (BP Location: Right arm, Patient Position: Sitting, Cuff Size: Small Adult)   Pulse 89   Temp 99.5 °F (37.5 °C) (Temporal)   Resp 18   Wt 45.4 kg (100 lb)   LMP  (LMP Unknown)   SpO2 98%   BMI 20.20 kg/m²     Objective   Physical Exam   Constitutional: She appears well-developed and well-nourished. She is cooperative. She appears ill. No distress.   Thin    HENT:   Head: Normocephalic.   Right Ear: External ear and ear canal normal. Tympanic membrane is not injected, not scarred, not perforated, not erythematous, not retracted and not bulging. A middle ear effusion (mild serous ) is present.   Left Ear: External ear and ear canal normal. Tympanic membrane is not injected, not scarred, not perforated, not erythematous, not retracted and not bulging. A middle ear effusion (mild serous ) is present.   Nose: Nose normal. Right sinus exhibits no maxillary sinus tenderness and no frontal sinus tenderness. Left sinus exhibits no maxillary sinus tenderness and no frontal sinus tenderness.   Mouth/Throat: Uvula is midline, oropharynx is clear and moist and mucous membranes are normal. No posterior oropharyngeal edema or posterior oropharyngeal erythema. No tonsillar exudate.   Cardiovascular: Normal rate, regular rhythm and normal heart sounds.   Pulmonary/Chest: She has decreased breath sounds in the right lower field and the left lower field. She has wheezes.   Neurological: She is alert.   Post Neb:  Improved airflow throughout, decreased work of breathing, decreased wheezing throughout all lobes.  Patient reported improvement in ease of breathing    Assessment/Plan   Antonio was seen today for  cough.    Diagnoses and all orders for this visit:    Acute bronchitis, unspecified organism  -     XR Chest PA & Lateral; Future  -     albuterol (PROVENTIL HFA;VENTOLIN HFA) 108 (90 Base) MCG/ACT inhaler; Inhale 2 puffs Every 4 (Four) Hours As Needed for Wheezing or Shortness of Air for up to 7 days.  -     dexamethasone (DECADRON) injection 10 mg; Inject 1 mL into the appropriate muscle as directed by prescriber 1 (One) Time.  -     albuterol (PROVENTIL) nebulizer solution 0.083% 2.5 mg/3mL; Take 2.5 mg by nebulization Every 6 (Six) Hours As Needed for Shortness of Air.  -     promethazine (PHENERGAN) 12.5 MG tablet; Take 1 tablet by mouth Every 6 (Six) Hours As Needed for Nausea or Vomiting for up to 4 days.    Rest.  Increase water intake. Tylenol and/or Motrin for pain.  Follow up with PCP if symptoms worsen. Patient aware of when to seek ER care.  Visit summary provided.  Test results, VS and PE findings reviewed today.  Questions/Concerns addressed.  Will contact when Chest xray results become available. Instructions for inhaler use verbally given.  Patient and patient's daughter verbalized understanding today.        Return if symptoms worsen or fail to improve with PCP or ER .

## 2019-01-11 ENCOUNTER — OFFICE VISIT (OUTPATIENT)
Dept: FAMILY MEDICINE CLINIC | Facility: CLINIC | Age: 69
End: 2019-01-11

## 2019-01-11 VITALS
TEMPERATURE: 98.2 F | DIASTOLIC BLOOD PRESSURE: 70 MMHG | OXYGEN SATURATION: 98 % | HEIGHT: 59 IN | HEART RATE: 83 BPM | SYSTOLIC BLOOD PRESSURE: 115 MMHG | WEIGHT: 102.25 LBS | BODY MASS INDEX: 20.61 KG/M2

## 2019-01-11 DIAGNOSIS — E78.2 MIXED HYPERLIPIDEMIA: ICD-10-CM

## 2019-01-11 DIAGNOSIS — E55.9 VITAMIN D DEFICIENCY: ICD-10-CM

## 2019-01-11 DIAGNOSIS — I10 ESSENTIAL HYPERTENSION: ICD-10-CM

## 2019-01-11 DIAGNOSIS — M81.0 AGE-RELATED OSTEOPOROSIS WITHOUT CURRENT PATHOLOGICAL FRACTURE: ICD-10-CM

## 2019-01-11 DIAGNOSIS — G50.0 TRIGEMINAL NEURALGIA: ICD-10-CM

## 2019-01-11 DIAGNOSIS — K21.9 GASTROESOPHAGEAL REFLUX DISEASE WITHOUT ESOPHAGITIS: ICD-10-CM

## 2019-01-11 DIAGNOSIS — F32.A DEPRESSION, UNSPECIFIED DEPRESSION TYPE: ICD-10-CM

## 2019-01-11 DIAGNOSIS — Z78.0 POST-MENOPAUSAL: ICD-10-CM

## 2019-01-11 PROCEDURE — 99204 OFFICE O/P NEW MOD 45 MIN: CPT | Performed by: NURSE PRACTITIONER

## 2019-01-11 RX ORDER — ALENDRONATE SODIUM 70 MG/1
TABLET ORAL
COMMUNITY
End: 2019-01-11

## 2019-01-11 NOTE — PROGRESS NOTES
Bjorn Barnes is a 68 y.o. female.     Patient is here today, accompanied by her daughter, to establish care with a new PCP. She has been seeing Dr Beck, for the past 7 years, but 3 months ago, her  passed away, and she just can not go back to that clinic anymore, because it brings back too many memories.        She has a history of HTN, for at least 20 years, but never needed medication until she started seeing Dr Beck in 2012. She was also started on Cholesterol medication at this time, for mixed hyperlipidemia. She was changed from Pravastatin to Lipitor, in March 2018 and her cholesterol was good in June 2018. She also takes Fenofibrate, but would like to stop it, because it is too expensive.  She has a history of depression, for at least 10 years, but never took any medication until starting to see Dr Beck. She started Zoloft in 2013, and it works very well for her. She has had some reactive depression, to her husbands death, but she has been finding ways to cope, like exercising, talking with her daughter about it, etc., and is doing much better with it.  She also has a history of trigeminal neuralgia, for about 7 years. She follows Dr Rosas for this. Her symptoms are not completely controlled, but are controlled better than they have ever been, on Gabapentin, Tegretol and Elavil. The Elavil helps her sleep as well. She has a history of GERD from time to time, but does not require daily medication, just as needed.    Her vaccines are up to date. She does not do Pap Smears anymore, hers were always normal. Her other screenings are up to date, except her DEXA scan, and she would like to get it scheduled. She does have a history of Osteoporosis. She was started on Fosamax, but it caused a lot of GI side effects for her, so she stopped it. Dr Beck ordered injections for her but they were too expensive and she could not afford them. He put her back on the Fosamax, but she does not take it, just  takes Calcium and Vitamin D. She has Mammograms every year. She has a history of breast cancer in 1994, with bilateral mastectomy, and has been cancer free since then.    She eats healthy and she exercises nearly every day, using an elliptical and free weights.     Past Medical History:  No date: Anemia  1994: Breast cancer (CMS/HCC)      Comment:  RIGHT  No date: Depression  No date: Esophagitis, reflux  No date: High cholesterol  2010: History of colonoscopy      Comment:  (Fiberoptic)  No date: History of mammogram  No date: History of Papanicolaou smear of cervix      Comment:  History of reported pap smear  No date: Hypertension  No date: Osteoarthritis  No date: Peptic ulcer  No date: Trigeminal neuralgia  No date: Urethral stricture due to infection    Past Surgical History:  No date: APPENDECTOMY  1994: BREAST BIOPSY; Right  No date: COLONOSCOPY      Comment:  Colonoscopy (Fiberoptic)  1994: MASTECTOMY; Right  No date: TONSILLECTOMY  No date: TONSILLECTOMY    Review of patient's family history indicates:  Problem: Hypertension      Relation: Mother          Age of Onset: (Not Specified)  Problem: Kidney disease      Relation: Mother          Age of Onset: (Not Specified)  Problem: Heart attack      Relation: Father          Age of Onset: (Not Specified)  Problem: Breast cancer      Relation: Maternal Aunt          Age of Onset: (Not Specified)          Comment: AGE UNKNOWN  Problem: Ovarian cancer      Relation: Neg Hx          Age of Onset: (Not Specified)           The following portions of the patient's history were reviewed and updated as appropriate: allergies, current medications, past family history, past medical history, past social history, past surgical history and problem list.    Review of Systems   Constitutional: Negative.    HENT: Negative.    Eyes: Negative.    Respiratory: Negative.    Cardiovascular: Negative.    Gastrointestinal: Negative.    Genitourinary: Negative.    Musculoskeletal:  "Negative.    Skin: Negative.    Allergic/Immunologic: Negative.    Neurological: Negative for dizziness, syncope, weakness, numbness and headaches.        Trigeminal neuralgia   Hematological: Negative for adenopathy.   Psychiatric/Behavioral: Positive for dysphoric mood. Negative for confusion and suicidal ideas. The patient is nervous/anxious.         Feels down at times, related to recent death of , but depression controlled. Feels very anxious at times, like she is going to have a panic attack, when she gets upset about her husbands death. Manages it well without medication.      Blood pressure 115/70, pulse 83, temperature 98.2 °F (36.8 °C), height 149.9 cm (59.02\"), weight 46.4 kg (102 lb 4 oz), SpO2 98 %, not currently breastfeeding.  Objective   Physical Exam   Constitutional: She is oriented to person, place, and time. She appears well-developed and well-nourished. No distress.   HENT:   Head: Normocephalic.   Right Ear: External ear normal.   Left Ear: External ear normal.   Nose: Nose normal.   Eyes: Conjunctivae are normal.   Neck: Normal range of motion. Neck supple. No tracheal deviation present. No thyromegaly present.   Cardiovascular: Normal rate, regular rhythm, normal heart sounds and intact distal pulses.   No murmur heard.  Pulmonary/Chest: Effort normal and breath sounds normal. No respiratory distress. She has no wheezes. She has no rales. She exhibits no tenderness.   Abdominal: Soft. Bowel sounds are normal. She exhibits no distension and no mass. There is no hepatosplenomegaly or splenomegaly. There is no tenderness. There is no rebound and no guarding. No hernia.   Musculoskeletal: Normal range of motion. She exhibits no edema or tenderness.   NROM all major joints   Lymphadenopathy:        Right cervical: No superficial cervical, no deep cervical and no posterior cervical adenopathy present.       Left cervical: No superficial cervical, no deep cervical and no posterior cervical " adenopathy present.   Neurological: She is alert and oriented to person, place, and time. Coordination and gait normal.   Skin: Skin is warm and dry. No rash noted.   Psychiatric: She has a normal mood and affect. Her behavior is normal. Judgment and thought content normal.   Nursing note and vitals reviewed.      Assessment/Plan   Antonio was seen today for establish care.    Diagnoses and all orders for this visit:    Essential hypertension    Mixed hyperlipidemia    Age-related osteoporosis without current pathological fracture  -     DEXA Bone Density Axial; Future    Post-menopausal  -     DEXA Bone Density Axial; Future    Vitamin D deficiency    Depression, unspecified depression type    Trigeminal neuralgia    Gastroesophageal reflux disease without esophagitis      No orders of the defined types were placed in this encounter.    Patient here to establish care with a new provider, wanting a new office after the death of her . This office is also closer to her home.     BP and hyperlipidemia, controlled on current medication. Continue all medications as directed before, except Fenofibrate. Patient advised to stop Fenofibrate, since it is too expensive for her. Will check a FLP in 3 months. Nutrition and activity goals reviewed including: mainly water to drink, limit white flour/processed sugar, high protein, high fiber carbs, good breakfast, working toward 150 mins cardio per week, resistance training 2x/week. Weight bearing exercises, along with Vitamin D and Calcium, will help prevent worsening Osteoporosis. DEXA scan ordered today. Other screenings, and all vaccines, are up to date.     Depression symptoms controlled.Patient and daughter educated on Grief counseling through Hospice and [facility]. They are aware of these but patient not interested at this time. She feels talking to her daughter helps enough and she also still has a little bit of a communication barrier.     Continue to follow   [facility] as directed, for her trigeminal neuralgia.     Continue prn GERD medications and avoid foods that cause worsening symptoms. Do not eat within 2 hours of laying down.     Patient and daughter encouraged to keep me informed of any acute changes, lack of improvement, or any new concerning symptoms. They voiced understanding of all instructions and denied further questions.    Patient to RTC in 3 months and prn.

## 2019-01-13 PROBLEM — Z78.0 POST-MENOPAUSAL: Status: ACTIVE | Noted: 2019-01-13

## 2019-01-14 ENCOUNTER — TELEPHONE (OUTPATIENT)
Dept: FAMILY MEDICINE CLINIC | Facility: CLINIC | Age: 69
End: 2019-01-14

## 2019-01-14 NOTE — TELEPHONE ENCOUNTER
Called pt to sched her fu & she said that she thought she was supposed to have fasting labs before her next appt.  Asked if someone could call her when orders have been entered and she will come in to get the labs drawn before her follow up.  Please advise.

## 2019-01-18 ENCOUNTER — APPOINTMENT (OUTPATIENT)
Dept: BONE DENSITY | Facility: HOSPITAL | Age: 69
End: 2019-01-18

## 2019-01-18 DIAGNOSIS — M81.0 AGE-RELATED OSTEOPOROSIS WITHOUT CURRENT PATHOLOGICAL FRACTURE: ICD-10-CM

## 2019-01-18 DIAGNOSIS — Z78.0 POST-MENOPAUSAL: ICD-10-CM

## 2019-01-18 PROCEDURE — 77080 DXA BONE DENSITY AXIAL: CPT

## 2019-01-22 DIAGNOSIS — M81.0 AGE-RELATED OSTEOPOROSIS WITHOUT CURRENT PATHOLOGICAL FRACTURE: Primary | ICD-10-CM

## 2019-01-22 RX ORDER — ALENDRONATE SODIUM 70 MG/1
70 TABLET ORAL
Qty: 4 TABLET | Refills: 5 | Status: SHIPPED | OUTPATIENT
Start: 2019-01-22 | End: 2019-02-21

## 2019-02-04 DIAGNOSIS — G50.0 TRIGEMINAL NEURALGIA: ICD-10-CM

## 2019-02-05 RX ORDER — AMLODIPINE BESYLATE 10 MG/1
10 TABLET ORAL DAILY
Qty: 90 TABLET | Refills: 1 | Status: SHIPPED | OUTPATIENT
Start: 2019-02-05 | End: 2019-07-15 | Stop reason: SDUPTHER

## 2019-02-05 RX ORDER — AMITRIPTYLINE HYDROCHLORIDE 10 MG/1
TABLET, FILM COATED ORAL
Qty: 135 TABLET | Refills: 1 | Status: SHIPPED | OUTPATIENT
Start: 2019-02-05 | End: 2019-07-14 | Stop reason: SDUPTHER

## 2019-04-18 ENCOUNTER — OFFICE VISIT (OUTPATIENT)
Dept: FAMILY MEDICINE CLINIC | Facility: CLINIC | Age: 69
End: 2019-04-18

## 2019-04-18 VITALS
TEMPERATURE: 98.5 F | SYSTOLIC BLOOD PRESSURE: 150 MMHG | BODY MASS INDEX: 21.04 KG/M2 | DIASTOLIC BLOOD PRESSURE: 85 MMHG | HEART RATE: 110 BPM | WEIGHT: 104.38 LBS | OXYGEN SATURATION: 95 % | HEIGHT: 59 IN

## 2019-04-18 DIAGNOSIS — R00.0 TACHYCARDIA: ICD-10-CM

## 2019-04-18 DIAGNOSIS — I10 ESSENTIAL HYPERTENSION: ICD-10-CM

## 2019-04-18 DIAGNOSIS — D50.8 OTHER IRON DEFICIENCY ANEMIA: Primary | ICD-10-CM

## 2019-04-18 DIAGNOSIS — F32.9 REACTIVE DEPRESSION: ICD-10-CM

## 2019-04-18 DIAGNOSIS — E78.2 MIXED HYPERLIPIDEMIA: ICD-10-CM

## 2019-04-18 DIAGNOSIS — D50.8 OTHER IRON DEFICIENCY ANEMIA: ICD-10-CM

## 2019-04-18 DIAGNOSIS — Z13.1 SCREENING FOR DIABETES MELLITUS: ICD-10-CM

## 2019-04-18 DIAGNOSIS — D56.9 THALASSEMIA, UNSPECIFIED TYPE: ICD-10-CM

## 2019-04-18 DIAGNOSIS — R73.09 ELEVATED HEMOGLOBIN A1C: ICD-10-CM

## 2019-04-18 LAB
ALBUMIN SERPL-MCNC: 4.9 G/DL (ref 3.5–5)
ALBUMIN/GLOB SERPL: 1.4 G/DL (ref 1–2)
ALP SERPL-CCNC: 117 U/L (ref 38–126)
ALT SERPL-CCNC: 36 U/L (ref 13–69)
AST SERPL-CCNC: 49 U/L (ref 15–46)
BILIRUB SERPL-MCNC: 0.3 MG/DL (ref 0.2–1.3)
BUN SERPL-MCNC: 10 MG/DL (ref 7–20)
BUN/CREAT SERPL: 20 (ref 7.1–23.5)
CALCIUM SERPL-MCNC: 10.3 MG/DL (ref 8.4–10.2)
CHLORIDE SERPL-SCNC: 98 MMOL/L (ref 98–107)
CHOLEST SERPL-MCNC: 248 MG/DL (ref 0–199)
CO2 SERPL-SCNC: 31 MMOL/L (ref 26–30)
CREAT SERPL-MCNC: 0.5 MG/DL (ref 0.6–1.3)
ERYTHROCYTE [DISTWIDTH] IN BLOOD BY AUTOMATED COUNT: 16.5 % (ref 12.3–15.4)
GLOBULIN SER CALC-MCNC: 3.5 GM/DL
GLUCOSE SERPL-MCNC: 97 MG/DL (ref 74–98)
HBA1C MFR BLD: 6.1 %
HCT VFR BLD AUTO: 41.7 % (ref 34–46.6)
HDLC SERPL-MCNC: 73 MG/DL (ref 40–60)
HGB BLD-MCNC: 12.8 G/DL (ref 12–15.9)
LDLC SERPL CALC-MCNC: 125 MG/DL (ref 0–99)
MCH RBC QN AUTO: 21.5 PG (ref 26.6–33)
MCHC RBC AUTO-ENTMCNC: 30.7 G/DL (ref 31.5–35.7)
MCV RBC AUTO: 70.1 FL (ref 79–97)
PLATELET # BLD AUTO: 388 10*3/MM3 (ref 140–450)
POTASSIUM SERPL-SCNC: 3.9 MMOL/L (ref 3.5–5.1)
PROT SERPL-MCNC: 8.4 G/DL (ref 6.3–8.2)
RBC # BLD AUTO: 5.95 10*6/MM3 (ref 3.77–5.28)
SODIUM SERPL-SCNC: 140 MMOL/L (ref 137–145)
T4 FREE SERPL-MCNC: 0.97 NG/DL (ref 0.78–2.19)
TRIGL SERPL-MCNC: 252 MG/DL
TSH SERPL DL<=0.005 MIU/L-ACNC: 2.01 MIU/ML (ref 0.47–4.68)
VLDLC SERPL CALC-MCNC: 50.4 MG/DL
WBC # BLD AUTO: 8.15 10*3/MM3 (ref 3.4–10.8)

## 2019-04-18 PROCEDURE — 83036 HEMOGLOBIN GLYCOSYLATED A1C: CPT | Performed by: NURSE PRACTITIONER

## 2019-04-18 PROCEDURE — 99214 OFFICE O/P EST MOD 30 MIN: CPT | Performed by: NURSE PRACTITIONER

## 2019-04-18 RX ORDER — METOPROLOL SUCCINATE 25 MG/1
25 TABLET, EXTENDED RELEASE ORAL DAILY
Qty: 30 TABLET | Refills: 1 | Status: SHIPPED | OUTPATIENT
Start: 2019-04-18 | End: 2019-05-17 | Stop reason: SDUPTHER

## 2019-04-18 NOTE — PROGRESS NOTES
Subjective   Antonio Barnes is a 68 y.o. female.     Patient is here today, for follow up on her chronic conditions:    HYPERTENSION   She has been taking Norvasc, 10mg daily, as directed. It had been doing very well, but it has been running high the past week.  She has been measuring her blood pressure at home, in the morning and in the evening and it has been elevated every time she checks it.  Her HR is up as well today. It has been elevated the past 3 days. She has not started any new medications, prescription or OTC, she has not increased caffeine, only one a day, in the mornings. She has not had any chest pain or palpitations. She has never had any issues with her heart rate. She did have some chest pain, in April 2018, and an EKG and stress test was normal.     HYPERLIPIDEMIA  She has a history of hyperlipidemia. Her LDL was 106 and HDL 66, in June 2018. She is taking Lipitor 40 mg qhs as directed, with no adverse SE. She does try to eat healthy most of the time, but did eat bad last month, because she was in [place] the entire month. She exercises 4-5 days a week.    DEPRESSION  She has a history of depression, related to the death of her . She takes Zoloft 50 mg daily, and feels it is effective for her. She still gets sad and cries often, but she does not feel that any of it is abnormal, since he just passed away in October 2018. If she is still having coping issues after a year, she will go to grief counseling.     She has a history of iron deficiency anemia and thalassemia and would like labs checked for this. She has also had an elevated A1c in the past ane would like it rechecked.          The following portions of the patient's history were reviewed and updated as appropriate: allergies, current medications, past family history, past medical history, past social history, past surgical history and problem list.    Review of Systems   Constitutional: Negative.  Negative for activity change,  appetite change, chills, diaphoresis, fatigue, fever and unexpected weight change.   HENT: Negative.    Eyes: Negative.    Respiratory: Negative.    Cardiovascular: Negative.    Gastrointestinal: Negative.    Genitourinary: Negative.    Musculoskeletal: Negative.    Skin: Negative.    Allergic/Immunologic: Negative.    Neurological: Negative for dizziness, syncope, weakness, numbness and headaches.   Hematological: Negative for adenopathy.   Psychiatric/Behavioral: Negative for confusion and suicidal ideas. The patient is not nervous/anxious.      Vitals:    04/18/19 1354   BP: 150/85   Pulse: 110   Temp: 98.5 °F (36.9 °C)   SpO2: 95%     Objective   Physical Exam   Constitutional: She is oriented to person, place, and time. She appears well-developed and well-nourished. No distress.   HENT:   Head: Normocephalic.   Right Ear: External ear normal.   Left Ear: External ear normal.   Nose: Nose normal.   Mouth/Throat: Oropharynx is clear and moist. No oropharyngeal exudate.   Eyes: Conjunctivae are normal.   Neck: Normal range of motion. Neck supple. No tracheal deviation present. No thyromegaly present.   Cardiovascular: Regular rhythm, normal heart sounds and intact distal pulses. Tachycardia present.   No murmur heard.  Pulmonary/Chest: Effort normal and breath sounds normal. No respiratory distress. She has no wheezes. She has no rales. She exhibits no tenderness.   Abdominal: Soft. Bowel sounds are normal. She exhibits no distension and no mass. There is no hepatosplenomegaly or splenomegaly. There is no tenderness. There is no rebound and no guarding. No hernia.   Musculoskeletal: Normal range of motion. She exhibits no edema or tenderness.   NROM all major joints   Lymphadenopathy:     She has no cervical adenopathy.        Right cervical: No superficial cervical, no deep cervical and no posterior cervical adenopathy present.       Left cervical: No superficial cervical, no deep cervical and no posterior  cervical adenopathy present.   Neurological: She is alert and oriented to person, place, and time. Coordination and gait normal.   Skin: Skin is warm and dry. No rash noted.   Psychiatric: She has a normal mood and affect. Her behavior is normal. Judgment and thought content normal.   Nursing note and vitals reviewed.      Assessment/Plan   Antonio was seen today for hypertension.    Diagnoses and all orders for this visit:    Essential hypertension  -     metoprolol succinate XL (TOPROL XL) 25 MG 24 hr tablet; Take 1 tablet by mouth Daily for 30 days.  -     Comprehensive Metabolic Panel  -     CBC (No Diff)    Tachycardia  -     metoprolol succinate XL (TOPROL XL) 25 MG 24 hr tablet; Take 1 tablet by mouth Daily for 30 days.  -     Comprehensive Metabolic Panel  -     CBC (No Diff)  -     TSH  -     T4, Free    Mixed hyperlipidemia  -     Lipid Panel    Other iron deficiency anemia  -     CBC (No Diff)    Thalassemia, unspecified type  -     CBC (No Diff)    Reactive depression  -     Comprehensive Metabolic Panel  -     CBC (No Diff)    Screening for diabetes mellitus    Elevated hemoglobin A1c  -     POC Glycosylated Hemoglobin (Hb A1C)      Metoprolol started today for treatment and better control of her HTN and tachycardia. She will continue Norvasc as directed. Patient educated on DASH diet, and keep sodium intake at 1500 mg per day or less.     Labs ordered today, for screening of her tachycardia, NORA and thalassemia. FLP drawn today, for assessment of her hyperlipidemia. She is taking her Lipitor as directed. I will contact patient regarding test results and provide instructions regarding any necessary changes in plan of care.    Continue Zoloft as directed, for her depression.     A1C 6.1 in the office today, controlled, and no symptoms of DM.     Patient was encouraged to keep me informed of any acute changes, lack of improvement, or any new concerning symptoms. She was advised to seek emergent help for  any symptoms of tachycardia or chest pain. Patient voiced understanding of all instructions and denied further questions.    Patient to RTC in 1 month for follow up or sooner if needed.

## 2019-04-19 LAB
FERRITIN SERPL-MCNC: 36.2 NG/ML (ref 11.1–264)
IRON SERPL-MCNC: 137 MCG/DL (ref 37–181)
Lab: NORMAL
WRITTEN AUTHORIZATION: NORMAL

## 2019-04-23 ENCOUNTER — RESULTS ENCOUNTER (OUTPATIENT)
Dept: FAMILY MEDICINE CLINIC | Facility: CLINIC | Age: 69
End: 2019-04-23

## 2019-04-23 DIAGNOSIS — D50.8 OTHER IRON DEFICIENCY ANEMIA: ICD-10-CM

## 2019-05-14 ENCOUNTER — TRANSCRIBE ORDERS (OUTPATIENT)
Dept: FAMILY MEDICINE CLINIC | Facility: CLINIC | Age: 69
End: 2019-05-14

## 2019-05-14 DIAGNOSIS — Z12.31 VISIT FOR SCREENING MAMMOGRAM: Primary | ICD-10-CM

## 2019-05-17 ENCOUNTER — OFFICE VISIT (OUTPATIENT)
Dept: FAMILY MEDICINE CLINIC | Facility: CLINIC | Age: 69
End: 2019-05-17

## 2019-05-17 VITALS
BODY MASS INDEX: 21.44 KG/M2 | WEIGHT: 106.38 LBS | SYSTOLIC BLOOD PRESSURE: 115 MMHG | HEIGHT: 59 IN | HEART RATE: 78 BPM | TEMPERATURE: 98.8 F | DIASTOLIC BLOOD PRESSURE: 80 MMHG | OXYGEN SATURATION: 97 %

## 2019-05-17 DIAGNOSIS — I10 ESSENTIAL HYPERTENSION: ICD-10-CM

## 2019-05-17 DIAGNOSIS — E78.2 MIXED HYPERLIPIDEMIA: ICD-10-CM

## 2019-05-17 DIAGNOSIS — R00.0 TACHYCARDIA: ICD-10-CM

## 2019-05-17 PROCEDURE — 99214 OFFICE O/P EST MOD 30 MIN: CPT | Performed by: NURSE PRACTITIONER

## 2019-05-17 RX ORDER — METOPROLOL SUCCINATE 25 MG/1
25 TABLET, EXTENDED RELEASE ORAL DAILY
Qty: 90 TABLET | Refills: 1 | Status: SHIPPED | OUTPATIENT
Start: 2019-05-17 | End: 2019-08-15

## 2019-05-17 NOTE — PROGRESS NOTES
Subjective   Antonio Barnes is a 68 y.o. female.     Patient is here today, for follow up on her hypertension and tachycardia, since starting Metoprolol. She has been taking it as directed and doing well with it. She has been taking her blood pressure and heart rate at home and they have been good.     Labs discussed with her in clinic today. Discussed with her that her cholesterol was abnormal. She feels it is because she [place], prior to her labs and that she also forgot to take her Lipitor some. She has been doing better since she came back though, and has been exercising 6-7 days a week, using her elliptical and walking. She has also cut back on sweets and carbs.          The following portions of the patient's history were reviewed and updated as appropriate: allergies, current medications, past family history, past medical history, past social history, past surgical history and problem list.    Review of Systems   Constitutional: Negative.    HENT: Negative.    Eyes: Negative.    Respiratory: Negative.    Cardiovascular: Negative.    Gastrointestinal: Negative.    Genitourinary: Negative.    Musculoskeletal: Negative.    Skin: Negative.    Neurological: Negative for dizziness, syncope, weakness and numbness.   Hematological: Negative for adenopathy.   Psychiatric/Behavioral: Negative for confusion and suicidal ideas. The patient is not nervous/anxious.      Vitals:    05/17/19 1327   BP: 115/80   Pulse: 78   Temp: 98.8 °F (37.1 °C)   SpO2: 97%     Objective   Physical Exam   Constitutional: She is oriented to person, place, and time. She appears well-developed and well-nourished. No distress.   HENT:   Head: Normocephalic.   Right Ear: External ear normal.   Left Ear: External ear normal.   Nose: Nose normal.   Mouth/Throat: Oropharynx is clear and moist. No oropharyngeal exudate.   Eyes: Conjunctivae are normal.   Neck: Normal range of motion. Neck supple.   Cardiovascular:  Normal rate, regular rhythm, normal heart sounds and intact distal pulses.   No murmur heard.  Pulmonary/Chest: Effort normal and breath sounds normal. No respiratory distress. She has no wheezes. She has no rales. She exhibits no tenderness.   Abdominal: Soft. Bowel sounds are normal. She exhibits no distension and no mass. There is no hepatosplenomegaly or splenomegaly. There is no tenderness. There is no rebound and no guarding. No hernia.   Musculoskeletal: Normal range of motion. She exhibits no edema or tenderness.   NROM all major joints   Neurological: She is alert and oriented to person, place, and time. Coordination and gait normal.   Skin: Skin is warm and dry. No rash noted.   Psychiatric: She has a normal mood and affect. Her behavior is normal. Judgment and thought content normal.   Nursing note and vitals reviewed.      Assessment/Plan   Antonio was seen today for hypertension.    Diagnoses and all orders for this visit:    Essential hypertension  -     metoprolol succinate XL (TOPROL XL) 25 MG 24 hr tablet; Take 1 tablet by mouth Daily for 90 days.    Tachycardia  -     metoprolol succinate XL (TOPROL XL) 25 MG 24 hr tablet; Take 1 tablet by mouth Daily for 90 days.    Mixed hyperlipidemia      Patient to continue Toprol XL as directed. It is working well to continue her HTN and tachycardia. She is tolerating it well with no SE. Patient advised to follow DASH diet and avoid caffeinated beverages, that may worsen her tachycardia.     Patient advised to take Lipitor as directed qhs, and follow a low fat diet. Nutrition and activity goals reviewed including: mainly water to drink, limit white flour/processed sugar, high protein, high fiber carbs, good breakfast, working toward 150 mins cardio per week, resistance training 2x/week.    Patient was encouraged to keep me informed of any acute changes, lack of improvement, or any new concerning symptoms. Patient voiced understanding of all instructions and  denied further questions.    Patient to RTC in 2 months for her regular follow up.

## 2019-05-31 ENCOUNTER — PATIENT MESSAGE (OUTPATIENT)
Dept: NEUROLOGY | Facility: CLINIC | Age: 69
End: 2019-05-31

## 2019-06-01 RX ORDER — GABAPENTIN 100 MG/1
CAPSULE ORAL
Qty: 180 CAPSULE | Refills: 3 | Status: SHIPPED | OUTPATIENT
Start: 2019-06-01 | End: 2019-06-12 | Stop reason: SDUPTHER

## 2019-06-12 RX ORDER — GABAPENTIN 300 MG/1
CAPSULE ORAL
Qty: 270 CAPSULE | Refills: 0 | Status: SHIPPED | OUTPATIENT
Start: 2019-06-12 | End: 2019-09-23 | Stop reason: SDUPTHER

## 2019-06-25 ENCOUNTER — HOSPITAL ENCOUNTER (OUTPATIENT)
Dept: MAMMOGRAPHY | Facility: HOSPITAL | Age: 69
Discharge: HOME | End: 2019-06-25
Admitting: FAMILY MEDICINE

## 2019-06-25 DIAGNOSIS — Z12.31 VISIT FOR SCREENING MAMMOGRAM: ICD-10-CM

## 2019-06-25 PROCEDURE — 77063 BREAST TOMOSYNTHESIS BI: CPT | Performed by: RADIOLOGY

## 2019-06-25 PROCEDURE — 77067 SCR MAMMO BI INCL CAD: CPT | Performed by: RADIOLOGY

## 2019-06-25 PROCEDURE — 77067 SCR MAMMO BI INCL CAD: CPT

## 2019-06-25 PROCEDURE — 77063 BREAST TOMOSYNTHESIS BI: CPT

## 2019-07-14 ENCOUNTER — PATIENT MESSAGE (OUTPATIENT)
Dept: FAMILY MEDICINE CLINIC | Facility: CLINIC | Age: 69
End: 2019-07-14

## 2019-07-14 DIAGNOSIS — G50.0 TRIGEMINAL NEURALGIA: ICD-10-CM

## 2019-07-14 RX ORDER — AMLODIPINE BESYLATE 10 MG/1
10 TABLET ORAL DAILY
Qty: 90 TABLET | Refills: 1 | Status: CANCELLED | OUTPATIENT
Start: 2019-07-14

## 2019-07-14 RX ORDER — ATORVASTATIN CALCIUM 40 MG/1
40 TABLET, FILM COATED ORAL NIGHTLY
Qty: 90 TABLET | Refills: 1 | Status: CANCELLED | OUTPATIENT
Start: 2019-07-14

## 2019-07-15 RX ORDER — AMITRIPTYLINE HYDROCHLORIDE 10 MG/1
TABLET, FILM COATED ORAL
Qty: 135 TABLET | Refills: 1 | Status: SHIPPED | OUTPATIENT
Start: 2019-07-15 | End: 2019-12-17 | Stop reason: SDUPTHER

## 2019-07-15 RX ORDER — AMLODIPINE BESYLATE 10 MG/1
10 TABLET ORAL DAILY
Qty: 90 TABLET | Refills: 1 | Status: SHIPPED | OUTPATIENT
Start: 2019-07-15 | End: 2019-12-06 | Stop reason: SDUPTHER

## 2019-07-15 RX ORDER — ATORVASTATIN CALCIUM 40 MG/1
40 TABLET, FILM COATED ORAL NIGHTLY
Qty: 90 TABLET | Refills: 1 | Status: SHIPPED | OUTPATIENT
Start: 2019-07-15 | End: 2019-12-06 | Stop reason: SDUPTHER

## 2019-07-15 NOTE — TELEPHONE ENCOUNTER
From: Antonio Barnes  To: Smiley Hernandes APRN  Sent: 7/14/2019 8:39 PM EDT  Subject: Prescription Question    Hello Ms. Reis,  Can you please request the refills for Amlodipine 10mg and Atorvastatin 40 mg to be sent [facility]?   Thank you so much and hope this finds all of you doing well.  Sincerely,  Antonio Barnes

## 2019-07-17 ENCOUNTER — TRANSCRIBE ORDERS (OUTPATIENT)
Dept: MAMMOGRAPHY | Facility: HOSPITAL | Age: 69
End: 2019-07-17

## 2019-07-17 ENCOUNTER — HOSPITAL ENCOUNTER (OUTPATIENT)
Dept: MAMMOGRAPHY | Facility: HOSPITAL | Age: 69
Discharge: HOME | End: 2019-07-17
Admitting: RADIOLOGY

## 2019-07-17 DIAGNOSIS — R92.8 ABNORMAL MAMMOGRAM: Primary | ICD-10-CM

## 2019-07-17 DIAGNOSIS — R92.8 ABNORMAL MAMMOGRAM: ICD-10-CM

## 2019-07-17 PROCEDURE — 77065 DX MAMMO INCL CAD UNI: CPT | Performed by: RADIOLOGY

## 2019-07-17 PROCEDURE — 77065 DX MAMMO INCL CAD UNI: CPT

## 2019-07-22 ENCOUNTER — OFFICE VISIT (OUTPATIENT)
Dept: FAMILY MEDICINE CLINIC | Facility: CLINIC | Age: 69
End: 2019-07-22

## 2019-07-22 VITALS
OXYGEN SATURATION: 97 % | HEIGHT: 59 IN | BODY MASS INDEX: 21.04 KG/M2 | DIASTOLIC BLOOD PRESSURE: 70 MMHG | SYSTOLIC BLOOD PRESSURE: 115 MMHG | HEART RATE: 90 BPM | TEMPERATURE: 98.1 F | WEIGHT: 104.38 LBS

## 2019-07-22 DIAGNOSIS — E78.2 MIXED HYPERLIPIDEMIA: ICD-10-CM

## 2019-07-22 DIAGNOSIS — G50.0 TRIGEMINAL NEURALGIA: ICD-10-CM

## 2019-07-22 DIAGNOSIS — R00.0 TACHYCARDIA: ICD-10-CM

## 2019-07-22 DIAGNOSIS — R74.8 ELEVATED LIVER ENZYMES: ICD-10-CM

## 2019-07-22 DIAGNOSIS — N64.4 BREAST PAIN, LEFT: ICD-10-CM

## 2019-07-22 DIAGNOSIS — D56.8 OTHER THALASSEMIA (HCC): ICD-10-CM

## 2019-07-22 DIAGNOSIS — F32.1 MODERATE SINGLE CURRENT EPISODE OF MAJOR DEPRESSIVE DISORDER (HCC): ICD-10-CM

## 2019-07-22 DIAGNOSIS — D64.9 ANEMIA, UNSPECIFIED TYPE: ICD-10-CM

## 2019-07-22 DIAGNOSIS — R92.8 ABNORMAL MAMMOGRAM OF LEFT BREAST: ICD-10-CM

## 2019-07-22 DIAGNOSIS — I10 ESSENTIAL HYPERTENSION: ICD-10-CM

## 2019-07-22 PROCEDURE — 99214 OFFICE O/P EST MOD 30 MIN: CPT | Performed by: NURSE PRACTITIONER

## 2019-07-22 NOTE — PROGRESS NOTES
Subjective   Antonio Barnes is a 68 y.o. female.     Patient is here today, for follow up on her chronic conditions:    HYPERTENSION   She has been taking Norvasc, 10mg daily and Toprol XL 25 mg daily,  as directed. It had been doing very well.  She has been measuring her blood pressure at home, in the morning and in the evening and it has been elevated every time she checks it. It is always good here though. She is only taking caffeine one time a day.     HYPERLIPIDEMIA  She has a history of hyperlipidemia. Her LDL was 106 and HDL 66, in June 2018. She is taking Lipitor 40 mg qhs as directed, with no adverse SE.  She exercises 4-5 days a week.    DEPRESSION  She has a history of depression, related to the death of her . She takes Zoloft 50 mg daily, and feels it is effective for her. She still gets sad and cries often, but she does not feel that any of it is abnormal, since he just passed away in October 2018. If she is still having coping issues after a year, she will go to grief counseling.     She had a Mammogram June 25 and it was abnormal.  Her left breast was tender and bothering her before she had the mammogram, so she was concerned something was wrong.  She had a diagnostic mammogram also, which was abnormal as well. She has a biopsy scheduled on 7/29 and will receive results 5-7 days after the biopsy.  Patient says that no matter what the results, she wants to have a mastectomy but does not want any other treatment. She did the same for her right breast cancer as well.         Hypertension   Pertinent negatives include no headaches.        The following portions of the patient's history were reviewed and updated as appropriate: allergies, current medications, past family history, past medical history, past social history, past surgical history and problem list.    Review of Systems   Constitutional: Negative.  Negative for activity change, appetite change, chills, diaphoresis, fatigue, fever and  unexpected weight change.   HENT: Negative.    Eyes: Negative.    Respiratory: Negative.    Cardiovascular: Negative.    Gastrointestinal: Negative.    Genitourinary: Negative.    Musculoskeletal: Negative.         Left breast tenderness   Skin: Negative.    Allergic/Immunologic: Negative.    Neurological: Negative for dizziness, syncope, weakness, numbness and headaches.   Hematological: Negative for adenopathy.   Psychiatric/Behavioral: Negative for confusion and suicidal ideas. The patient is not nervous/anxious.      Vitals:    07/22/19 1533   BP: 115/70   Pulse: 90   Temp: 98.1 °F (36.7 °C)   SpO2: 97%     Objective   Physical Exam   Constitutional: She is oriented to person, place, and time. She appears well-developed and well-nourished. No distress.   HENT:   Head: Normocephalic.   Right Ear: External ear normal.   Left Ear: External ear normal.   Nose: Nose normal.   Mouth/Throat: Oropharynx is clear and moist. No oropharyngeal exudate.   Eyes: Conjunctivae are normal.   Neck: Normal range of motion. Neck supple. No tracheal deviation present. No thyromegaly present.   Cardiovascular: Regular rhythm, normal heart sounds and intact distal pulses. Tachycardia present.   No murmur heard.  Pulmonary/Chest: Effort normal and breath sounds normal. No respiratory distress. She has no wheezes. She has no rales. She exhibits no tenderness. Left breast exhibits tenderness.   Abdominal: Soft. Bowel sounds are normal. She exhibits no distension and no mass. There is no hepatosplenomegaly or splenomegaly. There is no tenderness. There is no rebound and no guarding. No hernia.   Musculoskeletal: Normal range of motion. She exhibits no edema or tenderness.   NROM all major joints   Lymphadenopathy:     She has no cervical adenopathy.        Right cervical: No superficial cervical, no deep cervical and no posterior cervical adenopathy present.       Left cervical: No superficial cervical, no deep cervical and no posterior  cervical adenopathy present.   Neurological: She is alert and oriented to person, place, and time. Coordination and gait normal.   Skin: Skin is warm and dry. No rash noted.   Psychiatric: She has a normal mood and affect. Her behavior is normal. Judgment and thought content normal.   Nursing note and vitals reviewed.      Assessment/Plan   Antonio was seen today for hypertension.    Diagnoses and all orders for this visit:    Essential hypertension  -     Blood Pressure Device  -     Comprehensive Metabolic Panel    Tachycardia    Mixed hyperlipidemia  -     Cancel: Comprehensive Metabolic Panel; Future    Moderate single current episode of major depressive disorder (CMS/HCC)    Trigeminal neuralgia    Other thalassemia (CMS/HCC)    Anemia, unspecified type    Abnormal mammogram of left breast    Breast pain, left    Elevated liver enzymes  -     Cancel: Comprehensive Metabolic Panel; Future  -     Comprehensive Metabolic Panel       HTN and tachycardia controlled with current medications. She will continue Norvasc and Metoprolol as directed. Patient educated on DASH diet, and keep sodium intake at 1500 mg per day or less.    She is taking her Lipitor as directed. Nutrition and activity goals reviewed including: mainly water to drink, limit white flour/processed sugar, high protein, high fiber carbs, good breakfast, working toward 150 mins cardio per week, resistance training 2x/week.    Continue Zoloft as directed, for her depression. She feels symptoms are controlled.     She continues to take Elavil for her trigeminal neuralgia and it is working well for her.     Thalassemia and anemia stable.     Patient counseled on importance to follow through with breast biopsy, scheduled on 7/29/2019.     CMP obtained in clinic today, for assessment of her elevated LFT.     Patient was encouraged to keep me informed of any acute changes, lack of improvement, or any new concerning symptoms. She was advised to seek emergent  help for any symptoms of tachycardia or chest pain. Patient voiced understanding of all instructions and denied further questions.    Patient to RTC pending breast biopsy and in 3 months for follow up.

## 2019-07-23 LAB
ALBUMIN SERPL-MCNC: 4.8 G/DL (ref 3.6–4.8)
ALBUMIN/GLOB SERPL: 1.7 {RATIO} (ref 1.2–2.2)
ALP SERPL-CCNC: 95 IU/L (ref 39–117)
ALT SERPL-CCNC: 22 IU/L (ref 0–32)
AST SERPL-CCNC: 27 IU/L (ref 0–40)
BILIRUB SERPL-MCNC: <0.2 MG/DL (ref 0–1.2)
BUN SERPL-MCNC: 9 MG/DL (ref 8–27)
BUN/CREAT SERPL: 16 (ref 12–28)
CALCIUM SERPL-MCNC: 10.1 MG/DL (ref 8.7–10.3)
CHLORIDE SERPL-SCNC: 99 MMOL/L (ref 96–106)
CO2 SERPL-SCNC: 26 MMOL/L (ref 20–29)
CREAT SERPL-MCNC: 0.56 MG/DL (ref 0.57–1)
GLOBULIN SER CALC-MCNC: 2.9 G/DL (ref 1.5–4.5)
GLUCOSE SERPL-MCNC: 99 MG/DL (ref 65–99)
POTASSIUM SERPL-SCNC: 4.2 MMOL/L (ref 3.5–5.2)
PROT SERPL-MCNC: 7.7 G/DL (ref 6–8.5)
SODIUM SERPL-SCNC: 143 MMOL/L (ref 134–144)

## 2019-07-29 ENCOUNTER — HOSPITAL ENCOUNTER (OUTPATIENT)
Dept: MAMMOGRAPHY | Facility: HOSPITAL | Age: 69
Discharge: HOME | End: 2019-07-29
Admitting: RADIOLOGY

## 2019-07-29 ENCOUNTER — HOSPITAL ENCOUNTER (OUTPATIENT)
Dept: MAMMOGRAPHY | Facility: HOSPITAL | Age: 69
Discharge: HOME | End: 2019-07-29

## 2019-07-29 DIAGNOSIS — R92.8 ABNORMAL MAMMOGRAM: ICD-10-CM

## 2019-07-29 PROCEDURE — A4648 IMPLANTABLE TISSUE MARKER: HCPCS

## 2019-07-29 PROCEDURE — 77065 DX MAMMO INCL CAD UNI: CPT | Performed by: RADIOLOGY

## 2019-07-29 PROCEDURE — 88305 TISSUE EXAM BY PATHOLOGIST: CPT | Performed by: FAMILY MEDICINE

## 2019-07-29 PROCEDURE — 76098 X-RAY EXAM SURGICAL SPECIMEN: CPT

## 2019-07-29 PROCEDURE — 19081 BX BREAST 1ST LESION STRTCTC: CPT | Performed by: RADIOLOGY

## 2019-07-29 PROCEDURE — 25010000003 LIDOCAINE 1 % SOLUTION: Performed by: RADIOLOGY

## 2019-07-29 RX ORDER — LIDOCAINE HYDROCHLORIDE AND EPINEPHRINE 10; 10 MG/ML; UG/ML
10 INJECTION, SOLUTION INFILTRATION; PERINEURAL ONCE
Status: COMPLETED | OUTPATIENT
Start: 2019-07-29 | End: 2019-07-29

## 2019-07-29 RX ORDER — LIDOCAINE HYDROCHLORIDE 10 MG/ML
5 INJECTION, SOLUTION INFILTRATION; PERINEURAL ONCE
Status: COMPLETED | OUTPATIENT
Start: 2019-07-29 | End: 2019-07-29

## 2019-07-29 RX ADMIN — LIDOCAINE HYDROCHLORIDE,EPINEPHRINE BITARTRATE 17 ML: 10; .01 INJECTION, SOLUTION INFILTRATION; PERINEURAL at 11:27

## 2019-07-29 RX ADMIN — LIDOCAINE HYDROCHLORIDE 5 ML: 10 INJECTION, SOLUTION INFILTRATION; PERINEURAL at 11:23

## 2019-07-29 NOTE — PROGRESS NOTES
Alert and orientated. Denies discomfort. No active bleeding. Steri-strips not visualized, gauze dressing reinforced. Ice packs given. Verbalizes and demonstrates understanding of post-care instructions and written copy given.

## 2019-07-30 ENCOUNTER — TELEPHONE (OUTPATIENT)
Dept: MAMMOGRAPHY | Facility: HOSPITAL | Age: 69
End: 2019-07-30

## 2019-07-30 LAB
CYTO UR: NORMAL
LAB AP CASE REPORT: NORMAL
LAB AP CLINICAL INFORMATION: NORMAL
LAB AP DIAGNOSIS COMMENT: NORMAL
PATH REPORT.FINAL DX SPEC: NORMAL
PATH REPORT.GROSS SPEC: NORMAL

## 2019-07-31 ENCOUNTER — TRANSCRIBE ORDERS (OUTPATIENT)
Dept: MAMMOGRAPHY | Facility: HOSPITAL | Age: 69
End: 2019-07-31

## 2019-07-31 DIAGNOSIS — R92.8 ABNORMAL MAMMOGRAM: Primary | ICD-10-CM

## 2019-08-19 ENCOUNTER — PATIENT MESSAGE (OUTPATIENT)
Dept: FAMILY MEDICINE CLINIC | Facility: CLINIC | Age: 69
End: 2019-08-19

## 2019-08-20 RX ORDER — ALENDRONATE SODIUM 70 MG/1
70 TABLET ORAL
Qty: 12 TABLET | Refills: 1 | Status: SHIPPED | OUTPATIENT
Start: 2019-08-20 | End: 2019-12-06 | Stop reason: SDUPTHER

## 2019-09-23 ENCOUNTER — PATIENT MESSAGE (OUTPATIENT)
Dept: FAMILY MEDICINE CLINIC | Facility: CLINIC | Age: 69
End: 2019-09-23

## 2019-09-24 RX ORDER — CARBAMAZEPINE 200 MG/1
100 TABLET ORAL 3 TIMES DAILY
Qty: 270 TABLET | Refills: 1 | Status: SHIPPED | OUTPATIENT
Start: 2019-09-24 | End: 2019-12-10 | Stop reason: SDUPTHER

## 2019-09-24 RX ORDER — METOPROLOL SUCCINATE 25 MG/1
25 TABLET, EXTENDED RELEASE ORAL DAILY
Qty: 90 TABLET | Refills: 1 | Status: SHIPPED | OUTPATIENT
Start: 2019-09-24 | End: 2019-12-06 | Stop reason: SDUPTHER

## 2019-09-24 NOTE — TELEPHONE ENCOUNTER
From: Antonio Barnes  To: Smiley Hernandes, PETERSON  Sent: 9/23/2019 9:17 PM EDT  Subject: Non-Urgent Medical Question    Nemesio Curiel, Would you please send in a refill for Metoprolol ER(Succ) 25MG TAB to [facility] at your earliest convenience. Thank you. Bacilio

## 2019-09-25 ENCOUNTER — PATIENT MESSAGE (OUTPATIENT)
Dept: FAMILY MEDICINE CLINIC | Facility: CLINIC | Age: 69
End: 2019-09-25

## 2019-09-26 NOTE — TELEPHONE ENCOUNTER
From: Antonio Barnes  To: Smiley Hernandes APRN  Sent: 9/25/2019 8:19 PM EDT  Subject: Non-Urgent Medical Question    Nehemiah Curiel, I am sorry to bother you again, I also need a refill for Sertraline 50 mg to be sent to [facility]. Thank you so much I really appreciate your help.

## 2019-09-29 RX ORDER — GABAPENTIN 300 MG/1
CAPSULE ORAL
Qty: 270 CAPSULE | Refills: 0 | Status: SHIPPED | OUTPATIENT
Start: 2019-09-29 | End: 2019-12-06 | Stop reason: SDUPTHER

## 2019-11-21 ENCOUNTER — OFFICE VISIT (OUTPATIENT)
Dept: FAMILY MEDICINE CLINIC | Facility: CLINIC | Age: 69
End: 2019-11-21

## 2019-11-21 VITALS
SYSTOLIC BLOOD PRESSURE: 173 MMHG | HEIGHT: 59 IN | OXYGEN SATURATION: 96 % | DIASTOLIC BLOOD PRESSURE: 80 MMHG | HEART RATE: 76 BPM | TEMPERATURE: 98.1 F | BODY MASS INDEX: 21.87 KG/M2 | WEIGHT: 108.5 LBS

## 2019-11-21 DIAGNOSIS — Z23 NEED FOR INFLUENZA VACCINATION: ICD-10-CM

## 2019-11-21 DIAGNOSIS — E55.9 VITAMIN D DEFICIENCY: ICD-10-CM

## 2019-11-21 DIAGNOSIS — F32.1 MODERATE SINGLE CURRENT EPISODE OF MAJOR DEPRESSIVE DISORDER (HCC): ICD-10-CM

## 2019-11-21 DIAGNOSIS — E78.2 MIXED HYPERLIPIDEMIA: ICD-10-CM

## 2019-11-21 DIAGNOSIS — M81.0 OSTEOPOROSIS, UNSPECIFIED OSTEOPOROSIS TYPE, UNSPECIFIED PATHOLOGICAL FRACTURE PRESENCE: ICD-10-CM

## 2019-11-21 DIAGNOSIS — G50.0 TRIGEMINAL NEURALGIA: ICD-10-CM

## 2019-11-21 DIAGNOSIS — D56.8 OTHER THALASSEMIA (HCC): ICD-10-CM

## 2019-11-21 DIAGNOSIS — D64.9 ANEMIA, UNSPECIFIED TYPE: ICD-10-CM

## 2019-11-21 DIAGNOSIS — I10 ESSENTIAL HYPERTENSION: ICD-10-CM

## 2019-11-21 PROBLEM — R00.0 TACHYCARDIA: Status: RESOLVED | Noted: 2019-05-17 | Resolved: 2019-11-21

## 2019-11-21 PROCEDURE — 90653 IIV ADJUVANT VACCINE IM: CPT | Performed by: NURSE PRACTITIONER

## 2019-11-21 PROCEDURE — 99214 OFFICE O/P EST MOD 30 MIN: CPT | Performed by: NURSE PRACTITIONER

## 2019-11-21 PROCEDURE — G0008 ADMIN INFLUENZA VIRUS VAC: HCPCS | Performed by: NURSE PRACTITIONER

## 2019-11-21 NOTE — PROGRESS NOTES
Subjective   Antonio Barnes is a 69 y.o. female.     Patient is here today for follow up on her chronic conditions:    HYPERTENSION   She has been taking Norvasc, 10mg daily and Toprol XL 25 mg daily,  as directed. It had been doing very well.  She has been measuring her blood pressure at home, in the morning and in the evening and it has been good. It is high now because she forgot to take her medication this morning. She is only using caffeine in the mornings. She is trying to watch her sodium intake.     HYPERLIPIDEMIA  She has a history of hyperlipidemia. Her LDL was 125,  HDL 73, and Trigs 252 in April 2019. She is taking Lipitor 40 mg qhs as directed, with no adverse SE.  She exercises 4-5 days a week.     DEPRESSION  She has a history of depression, related to the death of her . She takes Zoloft 50 mg daily, and feels it is effective for her. She has taken up knitting,to keep her mind off things and it really helps. She is still sad but feels she is coping ok.     ANEMIA/THALASSEMIA   Anemia and thalassemia have been stable with previous labs. She feels her conditions are controlled.    TRIGEMINAL NEURALGIA   She is taking Elavil as directed and it is controlling her Trigeminal Neuralgia.     VITAMIN D DEFICIENCY /OSTEOPOROSIS   She is taking Vitamin D and calcium as directed for her Vitamin D def and Osteoporosis and tolerating them well.     She would like her flu injection today.           The following portions of the patient's history were reviewed and updated as appropriate: allergies, current medications, past family history, past medical history, past social history, past surgical history and problem list.    Review of Systems   Constitutional: Negative.    HENT: Negative.    Eyes: Negative.    Respiratory: Negative.    Cardiovascular: Negative.    Gastrointestinal: Negative.    Genitourinary: Negative.    Musculoskeletal: Negative.    Skin: Negative.    Allergic/Immunologic: Negative.     Neurological: Negative for dizziness, syncope, weakness, numbness and headaches.   Hematological: Negative for adenopathy.   Psychiatric/Behavioral: Positive for dysphoric mood. Negative for confusion, sleep disturbance and suicidal ideas. The patient is not nervous/anxious.      Vitals:    11/21/19 1038   BP: 173/80   Pulse: 76   Temp: 98.1 °F (36.7 °C)   SpO2: 96%     Objective   Physical Exam   Constitutional: She is oriented to person, place, and time. She appears well-developed and well-nourished. No distress.   HENT:   Head: Normocephalic.   Right Ear: External ear normal.   Left Ear: External ear normal.   Nose: Nose normal.   Mouth/Throat: Oropharynx is clear and moist.   Eyes: Conjunctivae are normal.   Neck: Normal range of motion. Neck supple.   Cardiovascular: Normal rate, regular rhythm and normal heart sounds.   No murmur heard.  Pulmonary/Chest: Effort normal and breath sounds normal. No respiratory distress. She has no wheezes. She has no rales. She exhibits no tenderness.   Abdominal: Soft. Bowel sounds are normal. She exhibits no distension and no mass. There is no hepatosplenomegaly or splenomegaly. There is no tenderness. There is no rebound and no guarding. No hernia.   Musculoskeletal: Normal range of motion. She exhibits no edema or tenderness.   NROM all major joints   Lymphadenopathy:        Right cervical: No superficial cervical, no deep cervical and no posterior cervical adenopathy present.       Left cervical: No superficial cervical, no deep cervical and no posterior cervical adenopathy present.   Neurological: She is alert and oriented to person, place, and time. Coordination and gait normal.   Skin: Skin is warm and dry. No rash noted.   Psychiatric: She has a normal mood and affect. Her behavior is normal. Judgment and thought content normal.   Nursing note and vitals reviewed.      Assessment/Plan   Antonio was seen today for hypertension.    Diagnoses and all orders for this  visit:    Essential hypertension  -     CBC (No Diff); Future  -     Comprehensive Metabolic Panel; Future    Mixed hyperlipidemia  -     CBC (No Diff); Future  -     Comprehensive Metabolic Panel; Future  -     Lipid Panel; Future    Moderate single current episode of major depressive disorder (CMS/HCC)    Trigeminal neuralgia    Anemia, unspecified type  -     CBC (No Diff); Future  -     Comprehensive Metabolic Panel; Future    Other thalassemia (CMS/HCC)  -     CBC (No Diff); Future  -     Comprehensive Metabolic Panel; Future    Vitamin D deficiency    Osteoporosis, unspecified osteoporosis type, unspecified pathological fracture presence    Need for influenza vaccination  -     Fluad Tri 65yr ([id number])          HTN and tachycardia controlled with current medications. Her blood pressure is elevated today, because she did not take her medication this morning. She was advised to go home and take this. She will continue Norvasc and Metoprolol as directed. Patient educated on DASH diet, and keep sodium intake at 1500 mg per day or less.     She is taking her Lipitor as directed. Nutrition and activity goals reviewed including: mainly water to drink, limit white flour/processed sugar, high protein, high fiber carbs, good breakfast, working toward 150 mins cardio per week, resistance training 2x/week.     Continue Zoloft as directed, for her depression. She feels symptoms are controlled.      She continues to take Elavil for her trigeminal neuralgia and it is working well for her.      Thalassemia and anemia stable.     Continue Vitamin D and Calcium as directed for Vitamin D deficiency.     Flu injection given with no adverse reactions.     Patient was encouraged to keep me informed of any acute changes, lack of improvement, or any new concerning symptoms. Patient voiced understanding of all instructions and denied further questions.    Patient to RTC in 3 months and prn.

## 2019-11-21 NOTE — PROGRESS NOTES
Patient is here today for follow up on her chronic conditions:    HYPERTENSION   She has been taking Norvasc, 10mg daily and Toprol XL 25 mg daily,  as directed. It had been doing very well.  She has been measuring her blood pressure at home, in the morning and in the evening and it has been elevated every time she checks it. It is always good here though. She is only taking caffeine one time a day.      HYPERLIPIDEMIA  She has a history of hyperlipidemia. Her LDL was 125,  HDL 73, and Trigs 252 in April 2019. She is taking Lipitor 40 mg qhs as directed, with no adverse SE.  She exercises 4-5 days a week.     DEPRESSION  She has a history of depression, related to the death of her . She takes Zoloft 50 mg daily, and feels it is effective for her.

## 2019-11-26 ENCOUNTER — RESULTS ENCOUNTER (OUTPATIENT)
Dept: FAMILY MEDICINE CLINIC | Facility: CLINIC | Age: 69
End: 2019-11-26

## 2019-11-26 DIAGNOSIS — D56.8 OTHER THALASSEMIA (HCC): ICD-10-CM

## 2019-11-26 DIAGNOSIS — D64.9 ANEMIA, UNSPECIFIED TYPE: ICD-10-CM

## 2019-11-26 DIAGNOSIS — E78.2 MIXED HYPERLIPIDEMIA: ICD-10-CM

## 2019-11-26 DIAGNOSIS — I10 ESSENTIAL HYPERTENSION: ICD-10-CM

## 2019-12-06 DIAGNOSIS — G50.0 TRIGEMINAL NEURALGIA: ICD-10-CM

## 2019-12-06 RX ORDER — METOPROLOL SUCCINATE 25 MG/1
25 TABLET, EXTENDED RELEASE ORAL DAILY
Qty: 90 TABLET | Refills: 1 | OUTPATIENT
Start: 2019-12-06

## 2019-12-06 RX ORDER — AMLODIPINE BESYLATE 10 MG/1
10 TABLET ORAL DAILY
Qty: 90 TABLET | Refills: 1 | OUTPATIENT
Start: 2019-12-06

## 2019-12-06 RX ORDER — AMLODIPINE BESYLATE 10 MG/1
TABLET ORAL
Qty: 90 TABLET | Refills: 1 | Status: SHIPPED | OUTPATIENT
Start: 2019-12-06 | End: 2020-05-11

## 2019-12-06 RX ORDER — ATORVASTATIN CALCIUM 40 MG/1
40 TABLET, FILM COATED ORAL NIGHTLY
Qty: 90 TABLET | Refills: 1 | Status: SHIPPED | OUTPATIENT
Start: 2019-12-06 | End: 2020-05-15

## 2019-12-06 RX ORDER — METOPROLOL SUCCINATE 25 MG/1
TABLET, EXTENDED RELEASE ORAL
Qty: 90 TABLET | Refills: 1 | Status: SHIPPED | OUTPATIENT
Start: 2019-12-06 | End: 2020-05-15

## 2019-12-06 RX ORDER — ALENDRONATE SODIUM 70 MG/1
70 TABLET ORAL
Qty: 12 TABLET | Refills: 1 | Status: SHIPPED | OUTPATIENT
Start: 2019-12-06 | End: 2020-03-02

## 2019-12-06 RX ORDER — ATORVASTATIN CALCIUM 40 MG/1
40 TABLET, FILM COATED ORAL NIGHTLY
Qty: 90 TABLET | Refills: 1 | OUTPATIENT
Start: 2019-12-06

## 2019-12-09 RX ORDER — GABAPENTIN 300 MG/1
CAPSULE ORAL
Qty: 270 CAPSULE | Refills: 0 | Status: SHIPPED | OUTPATIENT
Start: 2019-12-09 | End: 2020-04-01 | Stop reason: SDUPTHER

## 2019-12-09 RX ORDER — GABAPENTIN 300 MG/1
CAPSULE ORAL
Qty: 270 CAPSULE | Refills: 0 | OUTPATIENT
Start: 2019-12-09

## 2019-12-09 RX ORDER — AMITRIPTYLINE HYDROCHLORIDE 10 MG/1
TABLET, FILM COATED ORAL
Qty: 135 TABLET | Refills: 1 | OUTPATIENT
Start: 2019-12-09

## 2019-12-10 RX ORDER — CARBAMAZEPINE 200 MG/1
100 TABLET ORAL 3 TIMES DAILY
Qty: 270 TABLET | Refills: 1 | Status: SHIPPED | OUTPATIENT
Start: 2019-12-10 | End: 2020-04-01 | Stop reason: SDUPTHER

## 2019-12-17 DIAGNOSIS — G50.0 TRIGEMINAL NEURALGIA: ICD-10-CM

## 2019-12-17 RX ORDER — AMITRIPTYLINE HYDROCHLORIDE 10 MG/1
TABLET, FILM COATED ORAL
Qty: 135 TABLET | Refills: 1 | Status: SHIPPED | OUTPATIENT
Start: 2019-12-17 | End: 2020-03-02

## 2020-01-15 LAB
ALBUMIN SERPL-MCNC: 4.5 G/DL (ref 3.5–5.2)
ALBUMIN/GLOB SERPL: 1.7 G/DL
ALP SERPL-CCNC: 90 U/L (ref 39–117)
ALT SERPL-CCNC: 24 U/L (ref 1–33)
AST SERPL-CCNC: 28 U/L (ref 1–32)
BILIRUB SERPL-MCNC: 0.3 MG/DL (ref 0.2–1.2)
BUN SERPL-MCNC: 10 MG/DL (ref 8–23)
BUN/CREAT SERPL: 14.1 (ref 7–25)
CALCIUM SERPL-MCNC: 9.2 MG/DL (ref 8.6–10.5)
CHLORIDE SERPL-SCNC: 101 MMOL/L (ref 98–107)
CHOLEST SERPL-MCNC: 220 MG/DL (ref 0–200)
CO2 SERPL-SCNC: 28.3 MMOL/L (ref 22–29)
CREAT SERPL-MCNC: 0.71 MG/DL (ref 0.57–1)
ERYTHROCYTE [DISTWIDTH] IN BLOOD BY AUTOMATED COUNT: 17.1 % (ref 12.3–15.4)
GLOBULIN SER CALC-MCNC: 2.6 GM/DL
GLUCOSE SERPL-MCNC: 109 MG/DL (ref 65–99)
HCT VFR BLD AUTO: 38.7 % (ref 34–46.6)
HDLC SERPL-MCNC: 58 MG/DL (ref 40–60)
HGB BLD-MCNC: 11.8 G/DL (ref 12–15.9)
LDLC SERPL CALC-MCNC: 132 MG/DL (ref 0–100)
MCH RBC QN AUTO: 21.3 PG (ref 26.6–33)
MCHC RBC AUTO-ENTMCNC: 30.5 G/DL (ref 31.5–35.7)
MCV RBC AUTO: 70 FL (ref 79–97)
PLATELET # BLD AUTO: 350 10*3/MM3 (ref 140–450)
POTASSIUM SERPL-SCNC: 4.4 MMOL/L (ref 3.5–5.2)
PROT SERPL-MCNC: 7.1 G/DL (ref 6–8.5)
RBC # BLD AUTO: 5.53 10*6/MM3 (ref 3.77–5.28)
SODIUM SERPL-SCNC: 143 MMOL/L (ref 136–145)
TRIGL SERPL-MCNC: 148 MG/DL (ref 0–150)
VLDLC SERPL CALC-MCNC: 29.6 MG/DL
WBC # BLD AUTO: 7.79 10*3/MM3 (ref 3.4–10.8)

## 2020-01-16 LAB
FERRITIN SERPL-MCNC: 81.2 NG/ML (ref 13–150)
IRON SERPL-MCNC: 122 MCG/DL (ref 37–145)

## 2020-02-03 ENCOUNTER — TRANSCRIBE ORDERS (OUTPATIENT)
Dept: MAMMOGRAPHY | Facility: HOSPITAL | Age: 70
End: 2020-02-03

## 2020-02-03 ENCOUNTER — HOSPITAL ENCOUNTER (OUTPATIENT)
Dept: MAMMOGRAPHY | Facility: HOSPITAL | Age: 70
Discharge: HOME | End: 2020-02-03
Admitting: FAMILY MEDICINE

## 2020-02-03 DIAGNOSIS — Z12.31 VISIT FOR SCREENING MAMMOGRAM: Primary | ICD-10-CM

## 2020-02-03 DIAGNOSIS — R92.8 ABNORMAL MAMMOGRAM: ICD-10-CM

## 2020-02-03 PROCEDURE — G0279 TOMOSYNTHESIS, MAMMO: HCPCS

## 2020-02-03 PROCEDURE — G0279 TOMOSYNTHESIS, MAMMO: HCPCS | Performed by: RADIOLOGY

## 2020-02-03 PROCEDURE — 77065 DX MAMMO INCL CAD UNI: CPT | Performed by: RADIOLOGY

## 2020-02-03 PROCEDURE — 77065 DX MAMMO INCL CAD UNI: CPT

## 2020-02-27 ENCOUNTER — OFFICE VISIT (OUTPATIENT)
Dept: FAMILY MEDICINE CLINIC | Facility: CLINIC | Age: 70
End: 2020-02-27

## 2020-02-27 VITALS
HEART RATE: 75 BPM | BODY MASS INDEX: 21.57 KG/M2 | WEIGHT: 107 LBS | TEMPERATURE: 98.4 F | OXYGEN SATURATION: 98 % | SYSTOLIC BLOOD PRESSURE: 120 MMHG | HEIGHT: 59 IN | DIASTOLIC BLOOD PRESSURE: 70 MMHG

## 2020-02-27 DIAGNOSIS — D64.9 ANEMIA, UNSPECIFIED TYPE: ICD-10-CM

## 2020-02-27 DIAGNOSIS — E55.9 VITAMIN D DEFICIENCY: ICD-10-CM

## 2020-02-27 DIAGNOSIS — G50.0 TRIGEMINAL NEURALGIA: ICD-10-CM

## 2020-02-27 DIAGNOSIS — I10 ESSENTIAL HYPERTENSION: ICD-10-CM

## 2020-02-27 DIAGNOSIS — M81.0 OSTEOPOROSIS, UNSPECIFIED OSTEOPOROSIS TYPE, UNSPECIFIED PATHOLOGICAL FRACTURE PRESENCE: ICD-10-CM

## 2020-02-27 DIAGNOSIS — M25.562 CHRONIC PAIN OF LEFT KNEE: ICD-10-CM

## 2020-02-27 DIAGNOSIS — R00.0 TACHYCARDIA: ICD-10-CM

## 2020-02-27 DIAGNOSIS — F32.1 MODERATE SINGLE CURRENT EPISODE OF MAJOR DEPRESSIVE DISORDER (HCC): ICD-10-CM

## 2020-02-27 DIAGNOSIS — Z78.0 POST-MENOPAUSAL: ICD-10-CM

## 2020-02-27 DIAGNOSIS — E78.2 MIXED HYPERLIPIDEMIA: ICD-10-CM

## 2020-02-27 DIAGNOSIS — D56.8 OTHER THALASSEMIA (HCC): ICD-10-CM

## 2020-02-27 DIAGNOSIS — G89.29 CHRONIC PAIN OF LEFT KNEE: ICD-10-CM

## 2020-02-27 DIAGNOSIS — K21.9 GASTROESOPHAGEAL REFLUX DISEASE WITHOUT ESOPHAGITIS: ICD-10-CM

## 2020-02-27 DIAGNOSIS — K59.00 CONSTIPATION, UNSPECIFIED CONSTIPATION TYPE: ICD-10-CM

## 2020-02-27 DIAGNOSIS — Z23 NEED FOR SHINGLES VACCINE: ICD-10-CM

## 2020-02-27 PROCEDURE — 99214 OFFICE O/P EST MOD 30 MIN: CPT | Performed by: NURSE PRACTITIONER

## 2020-03-01 DIAGNOSIS — G50.0 TRIGEMINAL NEURALGIA: ICD-10-CM

## 2020-03-02 RX ORDER — ALENDRONATE SODIUM 70 MG/1
TABLET ORAL
Qty: 12 TABLET | Refills: 1 | Status: SHIPPED | OUTPATIENT
Start: 2020-03-02 | End: 2020-08-10

## 2020-03-02 RX ORDER — AMITRIPTYLINE HYDROCHLORIDE 10 MG/1
TABLET, FILM COATED ORAL
Qty: 135 TABLET | Refills: 0 | Status: SHIPPED | OUTPATIENT
Start: 2020-03-02 | End: 2020-04-01 | Stop reason: SDUPTHER

## 2020-03-05 ENCOUNTER — TELEPHONE (OUTPATIENT)
Dept: NEUROLOGY | Facility: CLINIC | Age: 70
End: 2020-03-05

## 2020-03-05 NOTE — TELEPHONE ENCOUNTER
PT CALLED IN AND SAID [facility] CONTACTED HER REGARDING HER GABAPENTIN. PT SAID THAT [facility] WILL NOT ALLOW HER TO REFILL THE MEDICATION BECAUSE THEY STILL HAVEN'T RECEIVED AN AUTHORIZATION. PT SAID IT  MG AND IS A FORMER PT OF [facility]. PT HAS UPCOMING APPT WITH KAROLINA GUAJARDO AND WANTS HER TO SEND OVER THE AUTH SO SHE CAN GET HER MEDICATION. PT'S PHARMACY IS MAIL ORDERED AND NEEDS TO BE SENT TO [address] [place] [address].    PT'S CALL BACK - [phone]

## 2020-03-17 NOTE — TELEPHONE ENCOUNTER
Patient states that this medication is received through [facility] mail order.    She requesting this medication be filled even though she cancelled her appointment due to the COVID-19. Patient states that she is at risk to the virus and would prefer to stay indoors.

## 2020-03-18 RX ORDER — GABAPENTIN 300 MG/1
300 CAPSULE ORAL 3 TIMES DAILY
Qty: 270 CAPSULE | Refills: 0 | OUTPATIENT
Start: 2020-03-18

## 2020-03-23 ENCOUNTER — TELEPHONE (OUTPATIENT)
Dept: NEUROLOGY | Facility: CLINIC | Age: 70
End: 2020-03-23

## 2020-03-23 NOTE — TELEPHONE ENCOUNTER
Pt canceled appt due to the covid-19 and did not r/s.  You had filled her medication 03/02/02 with 135 tabs and no refills.   Do you want to refill?

## 2020-03-24 NOTE — TELEPHONE ENCOUNTER
I see that we gave her a 30 day supply at the beginning of the month, she should still have a week's worth at least. I will not refill before then. If she schedules a follow up, I will give one more 30 day supply. I agree she is a vulnerable patient, please let her know that we are working on tele/ eVisit options. Thanks.

## 2020-03-31 ENCOUNTER — TELEPHONE (OUTPATIENT)
Dept: NEUROLOGY | Facility: CLINIC | Age: 70
End: 2020-03-31

## 2020-03-31 NOTE — TELEPHONE ENCOUNTER
Provider: KAROLINA GUAJARDO  Caller: KHANH HAER  Relationship to Patient: PT'S DAUGHTER  Phone Number: [phone]      Reason for Call: PT'S DAUGHTER WANTS TO SET UP A TELE/EVISIT SO SHE CAN GET HER MOM(PT) gabapentin (NEURONTIN) 300 MG capsule REFILLED DUE TO SHE ONLY HAS   2 DAYS OF MEDICINE LEFT. PLEASE CALL HER AS SOON AS POSSIBLE

## 2020-04-01 ENCOUNTER — TELEMEDICINE (OUTPATIENT)
Dept: NEUROLOGY | Facility: CLINIC | Age: 70
End: 2020-04-01

## 2020-04-01 DIAGNOSIS — G50.0 TRIGEMINAL NEURALGIA: Primary | ICD-10-CM

## 2020-04-01 PROCEDURE — 99213 OFFICE O/P EST LOW 20 MIN: CPT | Performed by: NURSE PRACTITIONER

## 2020-04-01 RX ORDER — GABAPENTIN 300 MG/1
300 CAPSULE ORAL 3 TIMES DAILY
Qty: 90 CAPSULE | Refills: 2 | Status: SHIPPED | OUTPATIENT
Start: 2020-04-01 | End: 2020-06-30

## 2020-04-01 RX ORDER — AMITRIPTYLINE HYDROCHLORIDE 10 MG/1
TABLET, FILM COATED ORAL
Qty: 135 TABLET | Refills: 2 | Status: SHIPPED | OUTPATIENT
Start: 2020-04-01 | End: 2021-01-19

## 2020-04-01 RX ORDER — CARBAMAZEPINE 200 MG/1
100 TABLET ORAL 3 TIMES DAILY
Qty: 270 TABLET | Refills: 2 | Status: SHIPPED | OUTPATIENT
Start: 2020-04-01 | End: 2020-08-11

## 2020-04-01 NOTE — PROGRESS NOTES
Follow Up Neurology Office Visit      Patient Name: Antonio Barnes    Referring Physician: No ref. provider found    Chief Complaint:  No chief complaint on file.      History of Present Illness: Antonio Barnes is a 69 y.o. female who is here to follow up with Neurology for management of trigeminal neuralgia.  She was well managed by previous provider with daily Tegretol, Neurontin, and Elavil.  She continues to experience some intermittent pain of moderate intensity, she reports that it is most concentrated along her right nasolabial fold.  This is tolerable for her and she is requesting refills of her current medications.  She denies any other changes in her health or new or concerning symptoms.    The following portions of the patient's history were reviewed and updated as appropriate: allergies, current medications, past family history, past medical history, past social history, past surgical history and problem list.    Subjective     Review of Systems:   Review of Systems   HENT:        Facial pain       Medications:     Current Outpatient Medications:   •  alendronate (FOSAMAX) 70 MG tablet, TAKE 1 TABLET EVERY WEEK, Disp: 12 tablet, Rfl: 1  •  amitriptyline (ELAVIL) 10 MG tablet, TAKE 1 AND 1/2 TABLETS EVERY DAY AT DINNER, Disp: 135 tablet, Rfl: 2  •  amLODIPine (NORVASC) 10 MG tablet, TAKE 1 TABLET EVERY DAY AS DIRECTED, Disp: 90 tablet, Rfl: 1  •  atorvastatin (LIPITOR) 40 MG tablet, TAKE 1 TABLET BY MOUTH EVERY NIGHT., Disp: 90 tablet, Rfl: 1  •  B Complex Vitamins (VITAMIN B COMPLEX) tablet, Take 1 tablet by mouth daily., Disp: , Rfl:   •  calcium-vitamin D (OSCAL-500) 500-200 MG-UNIT per tablet, Take 1 tablet by mouth 2 (two) times a day., Disp: , Rfl:   •  carBAMazepine (TEGretol) 200 MG tablet, Take 0.5 tablets by mouth 3 (Three) Times a Day., Disp: 270 tablet, Rfl: 2  •  Cholecalciferol (VITAMIN D3) 1000 UNITS capsule, Take 1 capsule by mouth daily., Disp: , Rfl:   •  gabapentin (NEURONTIN)  300 MG capsule, Take 1 capsule by mouth 3 (Three) Times a Day for 90 days. Indications: Trigeminal Nerve Pain, Disp: 90 capsule, Rfl: 2  •  metoprolol succinate XL (TOPROL-XL) 25 MG 24 hr tablet, TAKE 1 TABLET BY MOUTH DAILY FOR 90 DAYS., Disp: 90 tablet, Rfl: 1  •  Omega-3 Fatty Acids (FISH OIL PO), 1000 mg twice daily., Disp: , Rfl:   •  sertraline (ZOLOFT) 50 MG tablet, Take 1 tablet by mouth Daily., Disp: 90 tablet, Rfl: 3    Current Facility-Administered Medications:   •  albuterol (PROVENTIL) nebulizer solution 0.083% 2.5 mg/3mL, 2.5 mg, Nebulization, Q6H PRN, Berta Julian N, APRN, 2.5 mg at 11/20/18 1010    Allergies:   Allergies   Allergen Reactions   • Ace Inhibitors Cough   • Penicillins Other (See Comments)     Childhood allergy        Objective     Physical Exam: limited due to video  Vital Signs: There were no vitals filed for this visit.    Physical Exam   Constitutional: She is oriented to person, place, and time. She appears well-developed.   Eyes: EOM are normal.   Pulmonary/Chest: Effort normal.   Neurological: She is oriented to person, place, and time.   Psychiatric: She has a normal mood and affect. Her behavior is normal. Judgment and thought content normal.       Neurologic Exam     Mental Status   Oriented to person, place, and time.   Attention: normal. Concentration: normal.   Level of consciousness: alert  Knowledge: good.   Normal comprehension.     Cranial Nerves     CN III, IV, VI   Extraocular motions are normal.     CN VII   Facial expression full, symmetric.     CN VIII   Hearing: intact        Results Review:   I have reviewed the patient's other medical records to include, labs, radiology and referrals.   Records from previous provider reviewed from August 2013 through present.  Assessment / Plan      Assessment/Plan:   Diagnoses and all orders for this visit:    Trigeminal neuralgia  -     gabapentin (NEURONTIN) 300 MG capsule; Take 1 capsule by mouth 3 (Three) Times a Day for 90  days. Indications: Trigeminal Nerve Pain  -     carBAMazepine (TEGretol) 200 MG tablet; Take 0.5 tablets by mouth 3 (Three) Times a Day.  -     amitriptyline (ELAVIL) 10 MG tablet; TAKE 1 AND 1/2 TABLETS EVERY DAY AT DINNER    This patient is a very pleasant 69-year-old female who has been well managed on current medication regimen.  I have provided refills as requested.  Patient was encouraged to contact office if any changes in condition or questions arise.    MyChart Video Visit lasting 17 minutes.    Follow Up:   Return in about 6 months (around 10/1/2020) for Next scheduled follow up.       PETERSON Grullon  [facility]       Please note that portions of this note may have been completed with a voice recognition program. Efforts were made to edit the dictations, but occasionally words are mistranscribed.

## 2020-05-11 RX ORDER — AMLODIPINE BESYLATE 10 MG/1
TABLET ORAL
Qty: 90 TABLET | Refills: 1 | Status: SHIPPED | OUTPATIENT
Start: 2020-05-11 | End: 2020-05-15

## 2020-05-15 RX ORDER — AMLODIPINE BESYLATE 10 MG/1
TABLET ORAL
Qty: 90 TABLET | Refills: 1 | Status: SHIPPED | OUTPATIENT
Start: 2020-05-15 | End: 2020-12-09

## 2020-05-15 RX ORDER — METOPROLOL SUCCINATE 25 MG/1
TABLET, EXTENDED RELEASE ORAL
Qty: 90 TABLET | Refills: 1 | Status: SHIPPED | OUTPATIENT
Start: 2020-05-15 | End: 2020-12-16

## 2020-05-15 RX ORDER — ATORVASTATIN CALCIUM 40 MG/1
40 TABLET, FILM COATED ORAL NIGHTLY
Qty: 90 TABLET | Refills: 1 | Status: SHIPPED | OUTPATIENT
Start: 2020-05-15 | End: 2020-10-12

## 2020-07-02 ENCOUNTER — HOSPITAL ENCOUNTER (OUTPATIENT)
Dept: MAMMOGRAPHY | Facility: HOSPITAL | Age: 70
Discharge: HOME | End: 2020-07-02
Admitting: FAMILY MEDICINE

## 2020-07-02 DIAGNOSIS — Z12.31 VISIT FOR SCREENING MAMMOGRAM: ICD-10-CM

## 2020-07-02 PROCEDURE — 77063 BREAST TOMOSYNTHESIS BI: CPT | Performed by: RADIOLOGY

## 2020-07-02 PROCEDURE — 77063 BREAST TOMOSYNTHESIS BI: CPT

## 2020-07-02 PROCEDURE — 77067 SCR MAMMO BI INCL CAD: CPT | Performed by: RADIOLOGY

## 2020-07-02 PROCEDURE — 77067 SCR MAMMO BI INCL CAD: CPT

## 2020-07-30 DIAGNOSIS — G50.0 TRIGEMINAL NEURALGIA: ICD-10-CM

## 2020-07-30 RX ORDER — GABAPENTIN 300 MG/1
300 CAPSULE ORAL 3 TIMES DAILY
Qty: 90 CAPSULE | Refills: 2 | OUTPATIENT
Start: 2020-07-30 | End: 2020-10-28

## 2020-08-03 ENCOUNTER — TELEPHONE (OUTPATIENT)
Dept: NEUROLOGY | Facility: CLINIC | Age: 70
End: 2020-08-03

## 2020-08-03 NOTE — TELEPHONE ENCOUNTER
PT CALLED RE: GABAPENTIN 300 MG REFILL THAT HAS BEEN REJECTED, WOULD LIKE TO KNOW WHAT SHE NEEDS TO DO AT THIS TIME TO GET THIS REFILLED.    PT HAS 7 DAYS SUPPLY ON HAND    PT LAST SEEN 4-1-20    PHARMACY:  [facility]    BEST CALL BACK: [phone], LEAVE VM IF NO ANSWER

## 2020-08-10 RX ORDER — ALENDRONATE SODIUM 70 MG/1
TABLET ORAL
Qty: 12 TABLET | Refills: 1 | Status: SHIPPED | OUTPATIENT
Start: 2020-08-10 | End: 2020-11-18

## 2020-08-11 ENCOUNTER — TELEMEDICINE (OUTPATIENT)
Dept: NEUROLOGY | Facility: CLINIC | Age: 70
End: 2020-08-11

## 2020-08-11 DIAGNOSIS — G50.0 TRIGEMINAL NEURALGIA: Primary | ICD-10-CM

## 2020-08-11 DIAGNOSIS — G50.0 TRIGEMINAL NEURALGIA: ICD-10-CM

## 2020-08-11 PROCEDURE — 99213 OFFICE O/P EST LOW 20 MIN: CPT | Performed by: NURSE PRACTITIONER

## 2020-08-11 RX ORDER — CARBAMAZEPINE 200 MG/1
200 TABLET ORAL 2 TIMES DAILY
Qty: 180 TABLET | Refills: 1 | Status: SHIPPED | OUTPATIENT
Start: 2020-08-11 | End: 2021-03-18 | Stop reason: SDUPTHER

## 2020-08-11 RX ORDER — GABAPENTIN 300 MG/1
300 CAPSULE ORAL 3 TIMES DAILY
Qty: 21 CAPSULE | Refills: 0 | Status: SHIPPED | OUTPATIENT
Start: 2020-08-11 | End: 2020-08-14 | Stop reason: SDUPTHER

## 2020-08-11 NOTE — PROGRESS NOTES
Follow Up Neurology Office Visit      Patient Name: Antonio Barnes    Referring Physician: No ref. provider found    Chief Complaint:  Facial pain, numbness      History of Present Illness: Antonio Barnes is a 69 y.o. female who is here to follow up with Neurology for trigeminal neuralgia. Her symptoms are mostly well controlled, but she states that weather changes can still trigger pain.  She is requesting a refill of her gabapentin.  She continues to take amitriptyline nightly and Tegretol 3 times daily.    The following portions of the patient's history were reviewed and updated as appropriate: allergies, current medications, past family history, past medical history, past social history, past surgical history and problem list.    Subjective     Review of Systems:   Review of Systems   HENT: Positive for sinus pressure.    Neurological: Positive for numbness.       Medications:     Current Outpatient Medications:   •  alendronate (FOSAMAX) 70 MG tablet, TAKE 1 TABLET EVERY WEEK, Disp: 12 tablet, Rfl: 1  •  amitriptyline (ELAVIL) 10 MG tablet, TAKE 1 AND 1/2 TABLETS EVERY DAY AT DINNER, Disp: 135 tablet, Rfl: 2  •  amLODIPine (NORVASC) 10 MG tablet, TAKE 1 TABLET EVERY DAY AS DIRECTED, Disp: 90 tablet, Rfl: 1  •  atorvastatin (LIPITOR) 40 MG tablet, TAKE 1 TABLET BY MOUTH EVERY NIGHT., Disp: 90 tablet, Rfl: 1  •  B Complex Vitamins (VITAMIN B COMPLEX) tablet, Take 1 tablet by mouth daily., Disp: , Rfl:   •  calcium-vitamin D (OSCAL-500) 500-200 MG-UNIT per tablet, Take 1 tablet by mouth 2 (two) times a day., Disp: , Rfl:   •  carBAMazepine (TEGretol) 200 MG tablet, Take 1 tablet by mouth 2 (Two) Times a Day., Disp: 180 tablet, Rfl: 1  •  Cholecalciferol (VITAMIN D3) 1000 UNITS capsule, Take 1 capsule by mouth daily., Disp: , Rfl:   •  gabapentin (NEURONTIN) 300 MG capsule, Take 1 capsule by mouth 3 (Three) Times a Day., Disp: 21 capsule, Rfl: 0  •  metoprolol succinate XL (TOPROL-XL) 25 MG 24 hr tablet,  TAKE 1 TABLET BY MOUTH DAILY FOR 90 DAYS., Disp: 90 tablet, Rfl: 1  •  Omega-3 Fatty Acids (FISH OIL PO), 1000 mg twice daily., Disp: , Rfl:   •  sertraline (ZOLOFT) 50 MG tablet, Take 1 tablet by mouth Daily., Disp: 90 tablet, Rfl: 3    Current Facility-Administered Medications:   •  albuterol (PROVENTIL) nebulizer solution 0.083% 2.5 mg/3mL, 2.5 mg, Nebulization, Q6H PRN, Berta Julian N, APRN, 2.5 mg at 11/20/18 1010    Allergies:   Allergies   Allergen Reactions   • Ace Inhibitors Cough   • Penicillins Other (See Comments)     Childhood allergy        Objective     Physical Exam:  Vital Signs: There were no vitals filed for this visit.    Physical Exam   Eyes: EOM are normal.   Neurological: She is alert. GCS eye subscore is 4. GCS verbal subscore is 5. GCS motor subscore is 6.     FS, TML  Speech fluent and appropriate   Psychiatric: She has a normal mood and affect. Her behavior is normal. Judgment and thought content normal.       Neurologic Exam     Cranial Nerves     CN III, IV, VI   Extraocular motions are normal.         Results Review:   I have reviewed the patient's other medical records to include, labs, radiology and referrals.     Assessment / Plan      Assessment/Plan:   Diagnoses and all orders for this visit:    Trigeminal neuralgia    Patient continues to have frequent exacerbations of trigeminal neuralgia pain.  We discussed adjusting her Tegretol to 200 mg twice daily, and patient would like to try this for improved relief of trigeminal neuralgia pain.  She will continue to take amitriptyline.  She is requesting refill of gabapentin, and patient was notified that she needs to sign controlled substance agreement with this provider.  I have informed patient that I will prescribe a limited supply until controlled substance agreement contract has been signed by patient and myself.  She acknowledged this and states that she will come to office and sign contract.  As part of this patient's treatment  plan I am prescribing controlled substances. The patient has been made aware of appropriate use of such medications, including potential risk of somnolence, limited ability to drive and/or work safely, and potential for dependence or overdose. It has also been made clear that these medications are for use by the patient only, without concomitant use of alcohol or other substances unless prescribed. Keep out of reach of children.  Prem report has been reviewed. If this is going to be prescribed long term, [facility] Controlled Substance Agreement Contract has also been read and signed by patient and myself.    MyChart video visit lasting 9 minutes    Follow Up:   Return in about 6 months (around 2/11/2021) for Next scheduled follow up.       PETERSON Grullon  [facility]       Please note that portions of this note may have been completed with a voice recognition program. Efforts were made to edit the dictations, but occasionally words are mistranscribed.

## 2020-08-12 ENCOUNTER — TELEPHONE (OUTPATIENT)
Dept: NEUROLOGY | Facility: CLINIC | Age: 70
End: 2020-08-12

## 2020-08-12 NOTE — TELEPHONE ENCOUNTER
PT CALLED IN STATING THAT HER PHARMACY HAD NOT RECEIVED THE PRESCRIPTION FOR gabapentin (NEURONTIN) 300 MG capsule AND WAS WONDERING WHEN THIS WOULD BE CALLED IN, SHE STATES SHE IS GOING TO COME IN ON Friday TO SIGN THE CONTROLLED SUBSTANCE AGREEMENT PAPER,    PLEASE ADVISE.     Antonio Barnes (Self) [phone] (H)

## 2020-08-12 NOTE — TELEPHONE ENCOUNTER
SPOKE WITH PT TO LET HER KNOW THE MEDICATION IS READY FOR  AT [facility] IN [place].  PT STATES SHE WANTS THIS TO BE MAIL ORDER.  ADVISED PT THAT ONCE SHE CAME IN AND SIGNED CSA, [facility] WOULD SEND TO MAIL ORDER PHARMACY.

## 2020-08-14 DIAGNOSIS — G50.0 TRIGEMINAL NEURALGIA: ICD-10-CM

## 2020-08-14 RX ORDER — GABAPENTIN 300 MG/1
300 CAPSULE ORAL 3 TIMES DAILY
Qty: 270 CAPSULE | Refills: 0 | Status: SHIPPED | OUTPATIENT
Start: 2020-08-14 | End: 2020-10-30 | Stop reason: SDUPTHER

## 2020-10-12 RX ORDER — ATORVASTATIN CALCIUM 40 MG/1
40 TABLET, FILM COATED ORAL NIGHTLY
Qty: 90 TABLET | Refills: 1 | Status: SHIPPED | OUTPATIENT
Start: 2020-10-12 | End: 2021-03-10

## 2020-10-30 DIAGNOSIS — G50.0 TRIGEMINAL NEURALGIA: ICD-10-CM

## 2020-10-30 RX ORDER — GABAPENTIN 300 MG/1
300 CAPSULE ORAL 3 TIMES DAILY
Qty: 270 CAPSULE | Refills: 0 | Status: SHIPPED | OUTPATIENT
Start: 2020-10-30 | End: 2021-01-11 | Stop reason: SDUPTHER

## 2020-11-18 ENCOUNTER — OFFICE VISIT (OUTPATIENT)
Dept: FAMILY MEDICINE CLINIC | Facility: CLINIC | Age: 70
End: 2020-11-18

## 2020-11-18 VITALS
DIASTOLIC BLOOD PRESSURE: 72 MMHG | TEMPERATURE: 96.9 F | OXYGEN SATURATION: 98 % | SYSTOLIC BLOOD PRESSURE: 124 MMHG | BODY MASS INDEX: 22.18 KG/M2 | RESPIRATION RATE: 18 BRPM | WEIGHT: 110 LBS | HEART RATE: 84 BPM | HEIGHT: 59 IN

## 2020-11-18 DIAGNOSIS — R53.83 FATIGUE, UNSPECIFIED TYPE: ICD-10-CM

## 2020-11-18 DIAGNOSIS — D56.9 THALASSEMIA, UNSPECIFIED TYPE: ICD-10-CM

## 2020-11-18 DIAGNOSIS — Z78.0 POST-MENOPAUSAL: ICD-10-CM

## 2020-11-18 DIAGNOSIS — E78.2 MIXED HYPERLIPIDEMIA: ICD-10-CM

## 2020-11-18 DIAGNOSIS — R00.0 TACHYCARDIA: ICD-10-CM

## 2020-11-18 DIAGNOSIS — G50.0 TRIGEMINAL NEURALGIA: ICD-10-CM

## 2020-11-18 DIAGNOSIS — M81.0 OSTEOPOROSIS, UNSPECIFIED OSTEOPOROSIS TYPE, UNSPECIFIED PATHOLOGICAL FRACTURE PRESENCE: ICD-10-CM

## 2020-11-18 DIAGNOSIS — I10 ESSENTIAL HYPERTENSION: ICD-10-CM

## 2020-11-18 DIAGNOSIS — Z00.00 MEDICARE ANNUAL WELLNESS VISIT, SUBSEQUENT: Primary | ICD-10-CM

## 2020-11-18 DIAGNOSIS — M19.042 ARTHRITIS OF LEFT HAND: ICD-10-CM

## 2020-11-18 PROCEDURE — G0439 PPPS, SUBSEQ VISIT: HCPCS | Performed by: NURSE PRACTITIONER

## 2020-11-18 PROCEDURE — 90686 IIV4 VACC NO PRSV 0.5 ML IM: CPT | Performed by: NURSE PRACTITIONER

## 2020-11-18 PROCEDURE — G0008 ADMIN INFLUENZA VIRUS VAC: HCPCS | Performed by: NURSE PRACTITIONER

## 2020-11-18 RX ORDER — VIT C/B6/B5/MAGNESIUM/HERB 173 50-5-6-5MG
CAPSULE ORAL
Start: 2020-11-18 | End: 2021-03-18

## 2020-11-18 NOTE — PROGRESS NOTES
The ABCs of the Annual Wellness Visit  Subsequent Medicare Wellness Visit    Chief Complaint   Patient presents with   • Medicare Wellness-subsequent       Subjective   History of Present Illness:  Antonio Barnes is a 70 y.o. female who presents for a Subsequent Medicare Wellness Visit.    Notes worsening left pain at the base of her thumb. Also has pain in some her other joints. She notes her thumb pain started when she was knitting last year. Has not left. Pain is constant. Sometimes a little swollen. Does not index finger locks at times.   Notes worsening small bumps on her forehead that are worsening. No change in makeup. Sometimes itches. Uses neutrogena cream at night, has used for about 12 years.   Has trigmenial neuralgia.  Follows with neuro.  Currently on Tegretol, amitriptyline, and gabapentin.  Symptoms come and go but overall are controlled.  Has hypertension and tachycardia.  Takes amlodipine and metoprolol.  Does get very elevated readings on her blood pressure cuff of 190/90.  It does sometimes get off balance when she is cooking.  No chest pain or palpitations.  No vision changes.  No weakness on one side of the body.  Has hyperlipidemia.  Takes statin.  Has osteoporosis.  Takes Fosamax.  Due for DEXA scan in January.    HEALTH RISK ASSESSMENT    Recent Hospitalizations:  No hospitalization(s) within the last year.    Current Medical Providers:  Patient Care Team:  Smiley Hernandes APRN as PCP - General (Family Medicine)    Smoking Status:  Social History     Tobacco Use   Smoking Status Former Smoker   • Packs/day: 1.00   • Years: 20.00   • Pack years: 20.00   • Types: Cigarettes   • Quit date:    • Years since quittin.8   Smokeless Tobacco Never Used   Tobacco Comment    Quit smoking 2007       Alcohol Consumption:  Social History     Substance and Sexual Activity   Alcohol Use No       Depression Screen:   No flowsheet data found.    Fall Risk Screen:  ALISON Fall Risk  Assessment has not been completed.    Health Habits and Functional and Cognitive Screening:  Functional & Cognitive Status 11/18/2020   Do you have difficulty preparing food and eating? No   Do you have difficulty bathing yourself, getting dressed or grooming yourself? No   Do you have difficulty using the toilet? No   Do you have difficulty moving around from place to place? No   Do you have trouble with steps or getting out of a bed or a chair? No   Current Diet Well Balanced Diet   Dental Exam Unknown   Eye Exam Unknown   Exercise (times per week) 7 times per week   Current Exercise Activities Include Cardiovasular Workout on Exercise Equipment   Do you need help using the phone?  No   Are you deaf or do you have serious difficulty hearing?  No   Do you need help with transportation? No   Do you need help shopping? No   Do you need help preparing meals?  No   Do you need help with housework?  No   Do you need help with laundry? No   Do you need help taking your medications? No   Do you need help managing money? No   Do you ever drive or ride in a car without wearing a seat belt? No   Have you felt unusual stress, anger or loneliness in the last month? No   Who do you live with? Child   Have you been bothered in the last four weeks by sexual problems? No   Do you have difficulty concentrating, remembering or making decisions? Yes         Does the patient have evidence of cognitive impairment? No    Asprin use counseling:Does not need ASA (and currently is not on it)    Age-appropriate Screening Schedule:  Refer to the list below for future screening recommendations based on patient's age, sex and/or medical conditions. Orders for these recommended tests are listed in the plan section. The patient has been provided with a written plan.    Health Maintenance   Topic Date Due   • ZOSTER VACCINE (2 of 2) 11/03/2016   • INFLUENZA VACCINE  08/01/2020   • TDAP/TD VACCINES (1 - Tdap) 02/27/2025 (Originally 9/26/1969)   •  LIPID PANEL  01/14/2021   • DXA SCAN  01/18/2021   • MAMMOGRAM  07/02/2022   • COLONOSCOPY  03/14/2026          The following portions of the patient's history were reviewed and updated as appropriate: allergies, current medications, past family history, past medical history, past social history, past surgical history and problem list.    Outpatient Medications Prior to Visit   Medication Sig Dispense Refill   • amitriptyline (ELAVIL) 10 MG tablet TAKE 1 AND 1/2 TABLETS EVERY DAY AT DINNER 135 tablet 2   • amLODIPine (NORVASC) 10 MG tablet TAKE 1 TABLET EVERY DAY AS DIRECTED 90 tablet 1   • atorvastatin (LIPITOR) 40 MG tablet TAKE 1 TABLET BY MOUTH EVERY NIGHT. 90 tablet 1   • B Complex Vitamins (VITAMIN B COMPLEX) tablet Take 1 tablet by mouth daily.     • calcium-vitamin D (OSCAL-500) 500-200 MG-UNIT per tablet Take 1 tablet by mouth 2 (two) times a day.     • carBAMazepine (TEGretol) 200 MG tablet Take 1 tablet by mouth 2 (Two) Times a Day. 180 tablet 1   • Cholecalciferol (VITAMIN D3) 1000 UNITS capsule Take 1 capsule by mouth daily.     • gabapentin (NEURONTIN) 300 MG capsule Take 1 capsule by mouth 3 (Three) Times a Day for 90 days. 270 capsule 0   • metoprolol succinate XL (TOPROL-XL) 25 MG 24 hr tablet TAKE 1 TABLET BY MOUTH DAILY FOR 90 DAYS. 90 tablet 1   • Omega-3 Fatty Acids (FISH OIL PO) 1000 mg twice daily.     • sertraline (ZOLOFT) 50 MG tablet Take 1 tablet by mouth Daily. 90 tablet 3   • alendronate (FOSAMAX) 70 MG tablet TAKE 1 TABLET EVERY WEEK 12 tablet 1     Facility-Administered Medications Prior to Visit   Medication Dose Route Frequency Provider Last Rate Last Admin   • albuterol (PROVENTIL) nebulizer solution 0.083% 2.5 mg/3mL  2.5 mg Nebulization Q6H PRN Berta Julian APRN   2.5 mg at 11/20/18 1010       Patient Active Problem List   Diagnosis   • Anemia   • Constipation   • Fatigue   • Gastroesophageal reflux disease   • Mixed hyperlipidemia   • Hypertension   • Low back pain   • Neck  "pain   • Osteoporosis   • Thalassemia   • Trigeminal neuralgia   • Vitamin D deficiency   • Post-menopausal   • Tachycardia   • Moderate single current episode of major depressive disorder (CMS/HCC)       Advanced Care Planning:  ACP discussion was held with the patient during this visit. Patient does not have an advance directive, information provided.    Review of Systems  Gen- No fevers, chills  CV- No chest pain, palpitations  Resp- No cough, dyspnea  GI- No N/V/D, abd pain  Neuro-No dizziness, headaches    Compared to one year ago, the patient feels her physical health is the same.  Compared to one year ago, the patient feels her mental health is the same.    Reviewed chart for potential of high risk medication in the elderly: no  Reviewed chart for potential of harmful drug interactions in the elderly:yes    Objective         Vitals:    11/18/20 0949   BP: 124/72   Pulse: 84   Resp: 18   Temp: 96.9 °F (36.1 °C)   SpO2: 98%   Weight: 49.9 kg (110 lb)   Height: 149.9 cm (59\")   PainSc:   6       Body mass index is 22.22 kg/m².  Discussed the patient's BMI with her. The BMI is in the acceptable range.    Physical Exam  Vitals signs and nursing note reviewed.   Constitutional:       Appearance: Normal appearance. She is well-developed.   HENT:      Head: Normocephalic and atraumatic.      Right Ear: Tympanic membrane, ear canal and external ear normal.      Left Ear: Tympanic membrane, ear canal and external ear normal.      Nose: Nose normal.      Mouth/Throat:      Pharynx: Uvula midline.   Eyes:      Pupils: Pupils are equal, round, and reactive to light.   Neck:      Musculoskeletal: Neck supple.      Vascular: No carotid bruit.   Cardiovascular:      Rate and Rhythm: Normal rate and regular rhythm.      Heart sounds: Normal heart sounds. No murmur. No friction rub. No gallop.    Pulmonary:      Effort: Pulmonary effort is normal.      Breath sounds: Normal breath sounds.   Abdominal:      General: Bowel " sounds are normal.      Palpations: Abdomen is soft.      Tenderness: There is no abdominal tenderness.   Musculoskeletal:         General: No swelling.   Lymphadenopathy:      Head:      Right side of head: No submental, submandibular, tonsillar, preauricular or posterior auricular adenopathy.      Left side of head: No submental, submandibular, tonsillar, preauricular or posterior auricular adenopathy.      Cervical: No cervical adenopathy.   Skin:     General: Skin is warm and dry.      Comments: Tiny scattered white papules noted on forehead   Neurological:      General: No focal deficit present.      Mental Status: She is alert and oriented to person, place, and time.      Cranial Nerves: No cranial nerve deficit.      Sensory: No sensory deficit.      Coordination: Coordination normal.      Gait: Gait normal.   Psychiatric:         Mood and Affect: Mood normal.         Behavior: Behavior normal.               Assessment/Plan   Medicare Risks and Personalized Health Plan  CMS Preventative Services Quick Reference  Advance Directive Discussion  Osteoprorosis Risk    The above risks/problems have been discussed with the patient.  Pertinent information has been shared with the patient in the After Visit Summary.  Follow up plans and orders are seen below in the Assessment/Plan Section.    Diagnoses and all orders for this visit:    1. Medicare annual wellness visit, subsequent (Primary)    2. Osteoporosis, unspecified osteoporosis type, unspecified pathological fracture presence  -     Cancel: DEXA Bone Density Axial  -     DEXA Bone Density Axial; Future  -     Vitamin D 25 Hydroxy    3. Essential hypertension  -     CBC Auto Differential  -     Comprehensive Metabolic Panel    4. Mixed hyperlipidemia  -     Lipid Panel  -     CBC Auto Differential  -     Comprehensive Metabolic Panel    5. Tachycardia    6. Trigeminal neuralgia    7. Post-menopausal    8. Thalassemia, unspecified type  -     Iron and TIBC;  Future  -     Ferritin; Future    9. Fatigue, unspecified type  -     Vitamin B12; Future  -     TSH Rfx On Abnormal To Free T4; Future  -     Folate; Future    10. Arthritis of left hand  -     Turmeric 500 MG capsule; daily  Dispense:      Other orders  -     Cancel: Fluad Quad 65+ yrs (5436-9695)  -     Cancel: Fluad Quad 65+ yrs ([id number])  -     Fluzone Quad >6 Months (2159-1398)      --BP stable.  I have encouraged her to continue Norvasc and metoprolol at current doses.  Bring in home blood pressure cuff to correlate with manual cuff. Meds, diet, and lifestyle recommendation discussed at length. Home BP monitoring encouraged and appropriate intervals discussed.   --Neurology manages trigeminal neuralgia.  Continue gabapentin and Tegretol and amitriptyline  --Repeat DEXA scan in January.  Stop Fosamax that she has been on it for over 5 years.  Continue vitamin D.  Check vitamin D.  --Trial turmeric for arthritis.  Discussed pain in her hand is likely arthritis.  Also has left index trigger finger.  She does not want any aggressive measures    Follow Up:  Return in about 6 months (around 5/18/2021).     An After Visit Summary and PPPS were given to the patient.

## 2020-11-19 LAB
25(OH)D3+25(OH)D2 SERPL-MCNC: 30.2 NG/ML (ref 30–100)
ALBUMIN SERPL-MCNC: 4.4 G/DL (ref 3.5–5.2)
ALBUMIN/GLOB SERPL: 1.7 G/DL
ALP SERPL-CCNC: 109 U/L (ref 39–117)
ALT SERPL-CCNC: 17 U/L (ref 1–33)
AST SERPL-CCNC: 22 U/L (ref 1–32)
BASOPHILS # BLD MANUAL: 0.07 10*3/MM3 (ref 0–0.2)
BASOPHILS NFR BLD MANUAL: 1 % (ref 0–1.5)
BILIRUB SERPL-MCNC: 0.2 MG/DL (ref 0–1.2)
BUN SERPL-MCNC: 13 MG/DL (ref 8–23)
BUN/CREAT SERPL: 22.8 (ref 7–25)
CALCIUM SERPL-MCNC: 9.3 MG/DL (ref 8.6–10.5)
CHLORIDE SERPL-SCNC: 103 MMOL/L (ref 98–107)
CHOLEST SERPL-MCNC: 257 MG/DL (ref 0–200)
CO2 SERPL-SCNC: 29.7 MMOL/L (ref 22–29)
CREAT SERPL-MCNC: 0.57 MG/DL (ref 0.57–1)
DIFFERENTIAL COMMENT: ABNORMAL
EOSINOPHIL # BLD MANUAL: 0.43 10*3/MM3 (ref 0–0.4)
EOSINOPHIL NFR BLD MANUAL: 5.9 % (ref 0.3–6.2)
ERYTHROCYTE [DISTWIDTH] IN BLOOD BY AUTOMATED COUNT: 16.9 % (ref 12.3–15.4)
GLOBULIN SER CALC-MCNC: 2.6 GM/DL
GLUCOSE SERPL-MCNC: 103 MG/DL (ref 65–99)
HCT VFR BLD AUTO: 36.5 % (ref 34–46.6)
HDLC SERPL-MCNC: 54 MG/DL (ref 40–60)
HGB BLD-MCNC: 11.7 G/DL (ref 12–15.9)
LDLC SERPL CALC-MCNC: 146 MG/DL (ref 0–100)
LYMPHOCYTES # BLD MANUAL: 2.41 10*3/MM3 (ref 0.7–3.1)
LYMPHOCYTES NFR BLD MANUAL: 32.7 % (ref 19.6–45.3)
MCH RBC QN AUTO: 22.2 PG (ref 26.6–33)
MCHC RBC AUTO-ENTMCNC: 32.1 G/DL (ref 31.5–35.7)
MCV RBC AUTO: 69.3 FL (ref 79–97)
MONOCYTES # BLD MANUAL: 0.22 10*3/MM3 (ref 0.1–0.9)
MONOCYTES NFR BLD MANUAL: 3 % (ref 5–12)
NEUTROPHILS # BLD MANUAL: 4.22 10*3/MM3 (ref 1.7–7)
NEUTROPHILS NFR BLD MANUAL: 57.4 % (ref 42.7–76)
NRBC BLD AUTO-RTO: 0 /100 WBC (ref 0–0.2)
PLATELET # BLD AUTO: 319 10*3/MM3 (ref 140–450)
PLATELET BLD QL SMEAR: ABNORMAL
POTASSIUM SERPL-SCNC: 4.1 MMOL/L (ref 3.5–5.2)
PROT SERPL-MCNC: 7 G/DL (ref 6–8.5)
RBC # BLD AUTO: 5.27 10*6/MM3 (ref 3.77–5.28)
RBC MORPH BLD: ABNORMAL
SODIUM SERPL-SCNC: 143 MMOL/L (ref 136–145)
TRIGL SERPL-MCNC: 315 MG/DL (ref 0–150)
VLDLC SERPL CALC-MCNC: 57 MG/DL (ref 5–40)
WBC # BLD AUTO: 7.36 10*3/MM3 (ref 3.4–10.8)

## 2020-11-23 ENCOUNTER — RESULTS ENCOUNTER (OUTPATIENT)
Dept: FAMILY MEDICINE CLINIC | Facility: CLINIC | Age: 70
End: 2020-11-23

## 2020-11-23 DIAGNOSIS — R53.83 FATIGUE, UNSPECIFIED TYPE: ICD-10-CM

## 2020-11-23 DIAGNOSIS — D56.9 THALASSEMIA, UNSPECIFIED TYPE: ICD-10-CM

## 2020-11-23 NOTE — PROGRESS NOTES
Labs show mildly elevated cholesterol.  Continue to take atorvastatin nightly.  Work on increasing physical activity as tolerated.  Avoiding processed foods.  Anemia is mild and stable.  All other labs normal.

## 2020-12-09 RX ORDER — AMLODIPINE BESYLATE 10 MG/1
TABLET ORAL
Qty: 90 TABLET | Refills: 1 | Status: SHIPPED | OUTPATIENT
Start: 2020-12-09 | End: 2021-02-16 | Stop reason: SDUPTHER

## 2020-12-16 RX ORDER — METOPROLOL SUCCINATE 25 MG/1
TABLET, EXTENDED RELEASE ORAL
Qty: 90 TABLET | Refills: 1 | Status: SHIPPED | OUTPATIENT
Start: 2020-12-16 | End: 2021-05-14

## 2020-12-30 RX ORDER — ALENDRONATE SODIUM 70 MG/1
TABLET ORAL
Qty: 12 TABLET | Refills: 1 | OUTPATIENT
Start: 2020-12-30

## 2021-01-11 DIAGNOSIS — G50.0 TRIGEMINAL NEURALGIA: ICD-10-CM

## 2021-01-11 NOTE — TELEPHONE ENCOUNTER
DELETE AFTER REVIEWING: Telephone encounter to be sent to the clinical pool.  If patient has less than a 3 day supply left, send the encounter HIGH Priority.    Caller:  JCARLOS HARE    Relationship: SELF    Best call back number: [phone]    Medication needed:   Requested Prescriptions     Pending Prescriptions Disp Refills   • gabapentin (NEURONTIN) 300 MG capsule 270 capsule 0     Sig: Take 1 capsule by mouth 3 (Three) Times a Day for 90 days.       When do you need the refill by: EARLIEST CONVENIENCE    What details did the patient provide when requesting the medication: PT IS STILL TAKING MEDICATION AS PRESCRIBED, 300MG 3X DAILY    Does the patient have less than a 3 day supply:  [] Yes  [x] No    What is the patient's preferred pharmacy:    [facility]        DELETE AFTER READING TO PATIENT: “Thank you for sharing this information with me. I will send a message to the clinical team. Please allow 48 hours for the clinical staff to follow up on this request.”

## 2021-01-12 RX ORDER — GABAPENTIN 300 MG/1
300 CAPSULE ORAL 3 TIMES DAILY
Qty: 270 CAPSULE | Refills: 1 | Status: SHIPPED | OUTPATIENT
Start: 2021-01-12 | End: 2021-03-18 | Stop reason: SDUPTHER

## 2021-01-19 DIAGNOSIS — G50.0 TRIGEMINAL NEURALGIA: ICD-10-CM

## 2021-01-19 RX ORDER — AMITRIPTYLINE HYDROCHLORIDE 10 MG/1
TABLET, FILM COATED ORAL
Qty: 135 TABLET | Refills: 1 | Status: SHIPPED | OUTPATIENT
Start: 2021-01-19 | End: 2021-06-16

## 2021-01-25 ENCOUNTER — APPOINTMENT (OUTPATIENT)
Dept: BONE DENSITY | Facility: HOSPITAL | Age: 71
End: 2021-01-25

## 2021-01-25 DIAGNOSIS — M81.0 OSTEOPOROSIS, UNSPECIFIED OSTEOPOROSIS TYPE, UNSPECIFIED PATHOLOGICAL FRACTURE PRESENCE: ICD-10-CM

## 2021-01-25 PROCEDURE — 77080 DXA BONE DENSITY AXIAL: CPT

## 2021-01-27 NOTE — PROGRESS NOTES
dexa scan shows stable osteoporosis. I still recommend she stay off fosamax as she has been on it for 5 years. We can repeat DEXA in 2 years.

## 2021-02-16 RX ORDER — AMLODIPINE BESYLATE 10 MG/1
TABLET ORAL
Qty: 90 TABLET | Refills: 1 | Status: SHIPPED | OUTPATIENT
Start: 2021-02-16 | End: 2021-09-07

## 2021-03-10 RX ORDER — ATORVASTATIN CALCIUM 40 MG/1
TABLET, FILM COATED ORAL
Qty: 90 TABLET | Refills: 1 | Status: SHIPPED | OUTPATIENT
Start: 2021-03-10 | End: 2021-08-06

## 2021-03-18 ENCOUNTER — OFFICE VISIT (OUTPATIENT)
Dept: NEUROLOGY | Facility: CLINIC | Age: 71
End: 2021-03-18

## 2021-03-18 VITALS
HEART RATE: 87 BPM | TEMPERATURE: 97.1 F | OXYGEN SATURATION: 99 % | BODY MASS INDEX: 21.93 KG/M2 | DIASTOLIC BLOOD PRESSURE: 74 MMHG | HEIGHT: 59 IN | SYSTOLIC BLOOD PRESSURE: 128 MMHG | WEIGHT: 108.8 LBS

## 2021-03-18 DIAGNOSIS — F43.21 UNRESOLVED GRIEF: ICD-10-CM

## 2021-03-18 DIAGNOSIS — G50.0 TRIGEMINAL NEURALGIA: ICD-10-CM

## 2021-03-18 DIAGNOSIS — G50.1 FACIAL PAIN, ATYPICAL: Primary | ICD-10-CM

## 2021-03-18 PROCEDURE — 99214 OFFICE O/P EST MOD 30 MIN: CPT | Performed by: NURSE PRACTITIONER

## 2021-03-18 RX ORDER — GABAPENTIN 300 MG/1
CAPSULE ORAL
Qty: 360 CAPSULE | Refills: 0 | Status: SHIPPED | OUTPATIENT
Start: 2021-03-18 | End: 2021-04-29 | Stop reason: ALTCHOICE

## 2021-03-18 RX ORDER — CARBAMAZEPINE 200 MG/1
TABLET ORAL
Qty: 180 TABLET | Refills: 1 | Status: SHIPPED | OUTPATIENT
Start: 2021-03-18 | End: 2021-04-29 | Stop reason: DRUGHIGH

## 2021-03-18 NOTE — PROGRESS NOTES
Follow Up Neurology Office Visit      Patient Name: Antonio Barnes    Referring Physician: No ref. provider found    Chief Complaint:    Chief Complaint   Patient presents with   • Trigeminal Neuralgia     Patient is in office for trigeminal neuralgia follow up treatment.        History of Present Illness: Antonio Barnes is a very pleasant 70 y.o. female who is here to follow up with Neurology for trigeminal neuralgia.  She reports that she has had some worsening pain over the past 2 months, this is particularly bothersome with changes in weather pattern.  She is also very bothered by persistent right-sided facial numbness, she is self-conscious when eating with family and friends because she often does not notice if she has food on her face, or when her nose is running.  She has a long history of trigeminal neuralgia pain, she did undergo gamma knife procedure in 2013 with relief of pain, but does report that she experienced significant facial numbness after procedure.  She also reports that the facial pain has worsened after the death of her  in 2018.  She continues to take Tegretol 200 mg twice daily as well as gabapentin 300 mg 3 times daily.    The following portions of the patient's history were reviewed and updated as appropriate: allergies, current medications, past family history, past medical history, past social history, past surgical history and problem list.    Subjective     Review of Systems:   Review of Systems   Constitutional: Negative for activity change and fatigue.   HENT: Negative for drooling and voice change.    Eyes: Negative for blurred vision, double vision, photophobia and visual disturbance.   Respiratory: Negative for shortness of breath.    Cardiovascular: Negative for chest pain and palpitations.   Gastrointestinal: Negative for nausea and vomiting.   Genitourinary: Negative for urinary incontinence.   Musculoskeletal: Negative for arthralgias, back pain, gait problem,  myalgias and neck pain.   Allergic/Immunologic: Negative for immunocompromised state.   Neurological: Positive for weakness and numbness. Negative for dizziness, tremors, seizures, syncope, facial asymmetry, speech difficulty, light-headedness, headache, memory problem and confusion.   Hematological: Does not bruise/bleed easily.   Psychiatric/Behavioral: Negative for decreased concentration, dysphoric mood, hallucinations, sleep disturbance, depressed mood and stress. The patient is not nervous/anxious.        Medications:     Current Outpatient Medications:   •  amitriptyline (ELAVIL) 10 MG tablet, TAKE 1 AND 1/2 TABLETS EVERY DAY  AT  DINNER, Disp: 135 tablet, Rfl: 1  •  amLODIPine (NORVASC) 10 MG tablet, TAKE 1 TABLET EVERY DAY AS DIRECTED, Disp: 90 tablet, Rfl: 1  •  atorvastatin (LIPITOR) 40 MG tablet, TAKE 1 TABLET EVERY NIGHT, Disp: 90 tablet, Rfl: 1  •  B Complex Vitamins (VITAMIN B COMPLEX) tablet, Take 1 tablet by mouth daily., Disp: , Rfl:   •  calcium-vitamin D (OSCAL-500) 500-200 MG-UNIT per tablet, Take 1 tablet by mouth 2 (two) times a day., Disp: , Rfl:   •  carBAMazepine (TEGretol) 200 MG tablet, Take 1 tablet by mouth Every Morning AND 2 tablets Every Night., Disp: 180 tablet, Rfl: 1  •  Cholecalciferol (VITAMIN D3) 1000 UNITS capsule, Take 1 capsule by mouth daily., Disp: , Rfl:   •  gabapentin (NEURONTIN) 300 MG capsule, Take 1 capsule by mouth Daily With Breakfast & Lunch AND 2 capsules every night at bedtime. Do all this for 90 days., Disp: 360 capsule, Rfl: 0  •  metoprolol succinate XL (TOPROL-XL) 25 MG 24 hr tablet, TAKE 1 TABLET EVERY DAY, Disp: 90 tablet, Rfl: 1  •  Omega-3 Fatty Acids (FISH OIL PO), 1000 mg twice daily., Disp: , Rfl:   •  sertraline (ZOLOFT) 50 MG tablet, TAKE 1 TABLET EVERY DAY, Disp: 90 tablet, Rfl: 3    Current Facility-Administered Medications:   •  albuterol (PROVENTIL) nebulizer solution 0.083% 2.5 mg/3mL, 2.5 mg, Nebulization, Q6H PRN, Berta Julian N, APRN, 2.5  "mg at 11/20/18 1010    Allergies:   Allergies   Allergen Reactions   • Ace Inhibitors Cough   • Penicillins Other (See Comments)     Childhood allergy        Objective     Physical Exam:  Vital Signs:   Vitals:    03/18/21 1528   BP: 128/74   BP Location: Left arm   Patient Position: Sitting   Cuff Size: Adult   Pulse: 87   Temp: 97.1 °F (36.2 °C)   TempSrc: Temporal   SpO2: 99%   Weight: 49.4 kg (108 lb 12.8 oz)   Height: 149.9 cm (59\")       Physical Exam  Vitals and nursing note reviewed.   Constitutional:       Appearance: Normal appearance.   Eyes:      Extraocular Movements: EOM normal.      Pupils: Pupils are equal, round, and reactive to light.   Pulmonary:      Effort: Pulmonary effort is normal.   Skin:     General: Skin is warm.   Neurological:      Mental Status: She is oriented to person, place, and time. Mental status is at baseline.      Gait: Gait is intact.   Psychiatric:         Mood and Affect: Affect is tearful.         Speech: Speech normal.         Behavior: Behavior normal.         Thought Content: Thought content normal.         Judgment: Judgment normal.       Neurologic Exam     Mental Status   Oriented to person, place, and time.   Attention: normal. Concentration: normal.   Speech: speech is normal   Level of consciousness: alert  Knowledge: good.   Normal comprehension.     Cranial Nerves     CN II   Visual fields full to confrontation.     CN III, IV, VI   Pupils are equal, round, and reactive to light.  Extraocular motions are normal.     CN V   Right facial sensation deficit: cheeks and mandible  Left facial sensation deficit: none    CN VII   Facial expression full, symmetric.     CN VIII   CN VIII normal.     CN IX, X   CN IX normal.   CN X normal.     CN XI   CN XI normal.     CN XII   CN XII normal.     Motor Exam   Muscle bulk: normal  Overall muscle tone: normal    Sensory Exam   Light touch normal.     Gait, Coordination, and Reflexes     Gait  Gait: normal    Tremor   Resting " tremor: absent  2016  MR BRAIN W/WO CONTRAST       HISTORY: TRIGEMINAL NEURALGIA           PROCEDURE: Multiplanar multisequence imaging of the brain was performed     both before and following the administration of intravenous contrast.           FINDINGS: There are scattered periventricular and subcortical white     matter lesions consistent with chronic small vessel ischemic change.     There is no mass, mass effect or midline shift. There is no     hydrocephalus. There are no areas of restricted diffusion. There is no     pathologic contrast enhancement.           The midbrain, luis manuel, cerebellum and craniocervical junction are     unremarkable. The sella and pituitary gland are within normal limits.     The major intracranial vasculature demonstrates the expected flow     related signal. The paranasal sinuses are clear.          IMPRESSION- Findings consistent with chronic small vessel ischemic   change with no acute intracranial abnormality.          Results Review:   I reviewed the patient's new clinical results.  I have reviewed the patient's other medical records to include, labs, radiology and referrals.   Previous provider notes reviewed from 2016 through present    Assessment / Plan      Assessment/Plan:   Diagnoses and all orders for this visit:    1. Facial pain, atypical (Primary)  -     MRI Brain With & Without Contrast; Future  -     MRI angiogram head w wo contrast; Future  -     Ambulatory Referral to Neurosurgery  Patient has been having recent worsening of trigeminal pain, despite taking increased dose of Tegretol and gabapentin over the past year.  She has been evaluated by [facility] in the past, and describes what a believe is treatment with gamma knife in 2013 which did alleviate her pain somewhat.  She has undergone contrasted brain MRI last in 2016, I do not see any records of MRI to assess for possible vessel loop compression and I have ordered this today.  I have also ordered repeat  "contrasted brain MRI to assess for any structural or vascular abnormalities which may be contributing to her facial pain.  I have offered referral to [facility] for further evaluation and possible treatment of trigeminal pain, patient accepted this referral today.  2. Trigeminal neuralgia  -     carBAMazepine (TEGretol) 200 MG tablet; Take 1 tablet by mouth Every Morning AND 2 tablets Every Night.  Dispense: 180 tablet; Refill: 1  -     MRI Brain With & Without Contrast; Future  -     MRI angiogram head w wo contrast; Future  -     Ambulatory Referral to Neurosurgery  -     gabapentin (NEURONTIN) 300 MG capsule; Take 1 capsule by mouth Daily With Breakfast & Lunch AND 2 capsules every night at bedtime. Do all this for 90 days.  Dispense: 360 capsule; Refill: 0  Patient has been having increased trigeminal pain over the past few months.  I have ordered her for repeat imaging, and referred her to neurosurgery for evaluation and possible treatment options.  In the meantime, I have suggested that she increase her nighttime doses of Tegretol and gabapentin for enhanced relief of pain with minimal daytime somnolence.  She agreed to this plan today.    3. Unresolved grief  Patient reports that her pain has significantly worsened since the death of her  in 2018.  She is tearful several times in office today when talking about the death of her  of 47 years.  She has not sought counseling or psychiatric care for this.  I have offered referral to behavioral health today, she declined this stating that she has significant family support and this is helpful.  Although she admits that her facial pain is bad enough at times that it would \"just end\", she denies any active thoughts of suicide or self-harm today.  Emotional support provided and patient was encouraged to notify myself or other healthcare provider if she does experience worsening grief or thoughts of self-harm.     Follow Up:   Return in about 6 months (around " 9/18/2021) for Next scheduled follow up.     PETERSON Grullon  [facility]   AS THE PROVIDER, I PERSONALLY WORE PPE DURING ENTIRE FACE TO FACE ENCOUNTER IN CLINIC WITH THE PATIENT. PATIENT ALSO WORE PPE DURING ENTIRE FACE TO FACE ENCOUNTER EXCEPT FOR A MAX OF 30 SECONDS DURING NEUROLOGICAL EVALUATION OF CRANIAL NERVES AND THEN MASK WAS PLACED BACK OVER PATIENT FACE FOR REMAINDER OF VISIT. I WASHED MY HANDS BEFORE AND AFTER VISIT.    Please note that portions of this note may have been completed with a voice recognition program. Efforts were made to edit the dictations, but occasionally words are mistranscribed.

## 2021-03-23 ENCOUNTER — TELEPHONE (OUTPATIENT)
Dept: FAMILY MEDICINE CLINIC | Facility: CLINIC | Age: 71
End: 2021-03-23

## 2021-03-23 RX ORDER — DIAZEPAM 2 MG/1
TABLET ORAL
Qty: 1 TABLET | Refills: 0 | Status: SHIPPED | OUTPATIENT
Start: 2021-03-23 | End: 2021-04-29

## 2021-03-23 NOTE — TELEPHONE ENCOUNTER
PATIENT CALLED BACK AND STATED THAT SHE USUALLY SEES JACKELINE DELGADO AND WANTED TO MAKE SURE SHE GOT PREVIOUS REQUEST FOR MEDICATION TO HELP HER GET THROUGH THE HOUR LONG MRI.     IF ANY QUESTIONS PATIENT CAN BE REACHED AT: [phone]

## 2021-03-23 NOTE — TELEPHONE ENCOUNTER
Caller: Antonio Barnes    Relationship: Self    Best call back number: [phone]   What medication are you requesting: MEDICATION TO RELAX PATIENT ENOUGH TO GET MRI FOR ONE HOUR.     What are your current symptoms:  SHE STATES SHE IS ANXIOUS AND DOES NOT KNOW IF SHE IS CAN SIT STILL FOR ONE HOUR  How long have you been experiencing symptoms: NO SYMPTOMS - JUST REQUESTING THIS MEDICATION BEFORE THE APPOINTMENT IS MADE  Have you had these symptoms before:    [] Yes  [x] No    Have you been treated for these symptoms before:   [] Yes  [x] No    If a prescription is needed, what is your preferred pharmacy and phone number: [facility] [address] [phone] [facility] [phone]      Additional notes:  PLEASE CALL AND ADVISE [phone].

## 2021-03-26 DIAGNOSIS — F41.8 SITUATIONAL ANXIETY: Primary | ICD-10-CM

## 2021-03-26 RX ORDER — ALPRAZOLAM 0.5 MG/1
0.5 TABLET ORAL ONCE AS NEEDED
Qty: 2 TABLET | Refills: 0 | Status: SHIPPED | OUTPATIENT
Start: 2021-04-16 | End: 2021-04-29

## 2021-04-12 ENCOUNTER — APPOINTMENT (OUTPATIENT)
Dept: MRI IMAGING | Facility: HOSPITAL | Age: 71
End: 2021-04-12

## 2021-04-12 ENCOUNTER — HOSPITAL ENCOUNTER (OUTPATIENT)
Dept: MRI IMAGING | Facility: HOSPITAL | Age: 71
Discharge: HOME | End: 2021-04-12
Admitting: NURSE PRACTITIONER

## 2021-04-12 DIAGNOSIS — E78.2 MIXED HYPERLIPIDEMIA: Primary | ICD-10-CM

## 2021-04-12 DIAGNOSIS — G50.0 TRIGEMINAL NEURALGIA: ICD-10-CM

## 2021-04-12 DIAGNOSIS — G50.1 FACIAL PAIN, ATYPICAL: ICD-10-CM

## 2021-04-12 PROCEDURE — A9577 INJ MULTIHANCE: HCPCS | Performed by: NURSE PRACTITIONER

## 2021-04-12 PROCEDURE — 0 GADOBENATE DIMEGLUMINE 529 MG/ML SOLUTION: Performed by: NURSE PRACTITIONER

## 2021-04-12 PROCEDURE — 70553 MRI BRAIN STEM W/O & W/DYE: CPT

## 2021-04-12 RX ORDER — ASPIRIN 81 MG/1
81 TABLET ORAL DAILY
Qty: 90 TABLET | Refills: 1 | Status: SHIPPED | OUTPATIENT
Start: 2021-04-12 | End: 2021-05-18

## 2021-04-12 RX ADMIN — GADOBENATE DIMEGLUMINE 10 ML: 529 INJECTION, SOLUTION INTRAVENOUS at 08:48

## 2021-04-19 ENCOUNTER — HOSPITAL ENCOUNTER (OUTPATIENT)
Dept: MRI IMAGING | Facility: HOSPITAL | Age: 71
Discharge: HOME | End: 2021-04-19
Admitting: NURSE PRACTITIONER

## 2021-04-19 DIAGNOSIS — G50.0 TRIGEMINAL NEURALGIA: ICD-10-CM

## 2021-04-19 DIAGNOSIS — G50.1 FACIAL PAIN, ATYPICAL: ICD-10-CM

## 2021-04-19 PROCEDURE — 70544 MR ANGIOGRAPHY HEAD W/O DYE: CPT

## 2021-04-23 ENCOUNTER — TELEPHONE (OUTPATIENT)
Dept: NEUROLOGY | Facility: CLINIC | Age: 71
End: 2021-04-23

## 2021-04-23 NOTE — TELEPHONE ENCOUNTER
Provider: CASANDRA  Caller: AMY PORTER/DR. NUÑEZ OFFICE  Relationship to Patient: N/A  Phone Number: [phone]  Reason for Call: PLEASE SEND COPY OF MRI RESULTS TO DR. NUÑEZ OFFICE.    FAX #: [phone]    THANK YOU.

## 2021-04-29 ENCOUNTER — OFFICE VISIT (OUTPATIENT)
Dept: NEUROLOGY | Facility: CLINIC | Age: 71
End: 2021-04-29

## 2021-04-29 VITALS
SYSTOLIC BLOOD PRESSURE: 144 MMHG | HEART RATE: 72 BPM | BODY MASS INDEX: 22.01 KG/M2 | OXYGEN SATURATION: 99 % | TEMPERATURE: 97.5 F | WEIGHT: 109.2 LBS | DIASTOLIC BLOOD PRESSURE: 68 MMHG | HEIGHT: 59 IN

## 2021-04-29 DIAGNOSIS — G50.0 TRIGEMINAL NEURALGIA: ICD-10-CM

## 2021-04-29 PROCEDURE — 99214 OFFICE O/P EST MOD 30 MIN: CPT | Performed by: NURSE PRACTITIONER

## 2021-04-29 RX ORDER — CARBAMAZEPINE 400 MG/1
400 TABLET, EXTENDED RELEASE ORAL 2 TIMES DAILY
Qty: 180 TABLET | Refills: 1 | Status: SHIPPED | OUTPATIENT
Start: 2021-04-29 | End: 2021-09-21

## 2021-04-29 RX ORDER — PREGABALIN 50 MG/1
50 CAPSULE ORAL 2 TIMES DAILY
Qty: 180 CAPSULE | Refills: 0 | Status: SHIPPED | OUTPATIENT
Start: 2021-04-29 | End: 2021-05-18 | Stop reason: SDUPTHER

## 2021-04-29 NOTE — PATIENT INSTRUCTIONS
PLEASE keep the appointment with Dr. Bone in June. I am hopeful that he may be able to offer you additional treatment options.    I have increased your carbamazepine to 400mg, take this morning and night.    I am changing your gabapentin to pregabalin. When this comes, stop the gabapentin and begin taking the pregabalin twice a day.     Follow up with Ritu Chavez (primary care) next month (5/18) as well.     Trigeminal Neuralgia    Trigeminal neuralgia is a nerve disorder that causes severe pain on one side of the face. The pain may last from a few seconds to several minutes. The pain is usually only on one side of the face.  Symptoms may occur for days, weeks, or months and then go away for months or years. The pain may return and be worse than before.  What are the causes?  This condition is caused by damage or pressure to a nerve in the head that is called the trigeminal nerve. An attack can be triggered by:  · Talking.  · Chewing.  · Putting on makeup.  · Washing your face.  · Shaving your face.  · Brushing your teeth.  · Touching your face.  What increases the risk?  You are more likely to develop this condition if you:  · Are 50 years of age or older.  · Are female.  What are the signs or symptoms?  The main symptom of this condition is severe pain in the:  · Jaw.  · Lips.  · Eyes.  · Nose.  · Scalp.  · Forehead.  · Face.  The pain may be:  · Intense.  · Stabbing.  · Electric.  · Shock-like.  How is this diagnosed?  This condition is diagnosed with a physical exam. A CT scan or an MRI may be done to rule out other conditions that can cause facial pain.  How is this treated?  This condition may be treated with:  · Avoiding the things that trigger your symptoms.  · Taking prescription medicines (anticonvulsants).  · Having surgery. This may be done in severe cases if other medical treatment does not provide relief.  · Having procedures such as ablation, thermal, or radiation therapy.  It may take up to  one month for treatment to start relieving the pain.  Follow these instructions at home:  Managing pain  · Learn as much as you can about how to manage your pain. Ask your health care provider if a pain specialist would be helpful.  · Consider talking with a mental health care provider (psychologist) about how to cope with the pain.  · Consider joining a pain support group.  General instructions  · Take over-the-counter and prescription medicines only as told by your health care provider.  · Avoid the things that trigger your symptoms. It may help to:  ? Chew on the unaffected side of your mouth.  ? Avoid touching your face.  ? Avoid blasts of hot or cold air.  · Follow your treatment plan as told by your health care provider. This may include:  ? Cognitive or behavioral therapy.  ? Gentle, regular exercise.  ? Meditation or yoga.  ? Aromatherapy.  · Keep all follow-up visits as told by your health care provider. You may need to be monitored closely to make sure treatment is working well for you.  Where to find more information  · Facial Pain Association: fpa-support.org  Contact a health care provider if:  · Your medicine is not helping your symptoms.  · You have side effects from the medicine used for treatment.  · You develop new, unexplained symptoms, such as:  ? Double vision.  ? Facial weakness.  ? Facial numbness.  ? Changes in hearing or balance.  · You feel depressed.  Get help right away if:  · Your pain is severe and is not getting better.  · You develop suicidal thoughts.  If you ever feel like you may hurt yourself or others, or have thoughts about taking your own life, get help right away. You can go to your nearest emergency department or call:  · Your local emergency services (911 in the [place]  · A suicide crisis helpline, such as the National Suicide Prevention Lifeline at 1-535.122.1653. This is open 24 hours a day.  Summary  · Trigeminal neuralgia is a nerve disorder that causes severe pain on  one side of the face. The pain may last from a few seconds to several minutes.  · This condition is caused by damage or pressure to a nerve in the head that is called the trigeminal nerve.  · Treatment may include avoiding the things that trigger your symptoms, taking medicines, or having surgery or procedures. It may take up to one month for treatment to start relieving the pain.  · Avoid the things that trigger your symptoms.  · Keep all follow-up visits as told by your health care provider. You may need to be monitored closely to make sure treatment is working well for you.  This information is not intended to replace advice given to you by your health care provider. Make sure you discuss any questions you have with your health care provider.  Document Revised: 11/04/2019 Document Reviewed: 11/04/2019  Elsevier Patient Education © 2021 [employer] Inc[employer]

## 2021-04-29 NOTE — PROGRESS NOTES
Follow Up Neurology Office Visit      Patient Name: Antonio Barnes    Referring Physician: No ref. provider found    Chief Complaint:    Chief Complaint   Patient presents with   • Follow-up     Patient in office today for follow up on Trigeminal neuralgia.         History of Present Illness: Antonio Barnes is a 70 y.o. female who is here to follow up with Neurology for trigeminal neuralgia. Has not seen Dr. Bone, rescheduled appt to June  Has been having some GI discomfort with ASA, grandson has alpha thalassemia. Denies hematochezia, BRBPR  Initially fatigued with higher dose carbamazepine, improved after 1-2 weeks  Still significant pain    The following portions of the patient's history were reviewed and updated as appropriate: allergies, current medications, past family history, past medical history, past social history, past surgical history and problem list.    Subjective     Review of Systems:   Review of Systems   Constitutional: Negative for activity change and fatigue.   HENT: Negative for drooling and voice change.    Eyes: Negative for blurred vision, double vision, photophobia and visual disturbance.   Respiratory: Negative for shortness of breath.    Cardiovascular: Negative for chest pain and palpitations.   Gastrointestinal: Negative for nausea and vomiting.   Genitourinary: Negative for urinary incontinence.   Musculoskeletal: Positive for gait problem. Negative for arthralgias, back pain, myalgias and neck pain.   Allergic/Immunologic: Negative for immunocompromised state.   Neurological: Positive for tremors, weakness and numbness. Negative for dizziness, seizures, syncope, facial asymmetry, speech difficulty, light-headedness, headache, memory problem and confusion.   Hematological: Does not bruise/bleed easily.   Psychiatric/Behavioral: Negative for decreased concentration, dysphoric mood, hallucinations, sleep disturbance, depressed mood and stress. The patient is not  "nervous/anxious.      Medications:     Current Outpatient Medications:   •  amitriptyline (ELAVIL) 10 MG tablet, TAKE 1 AND 1/2 TABLETS EVERY DAY  AT  DINNER, Disp: 135 tablet, Rfl: 1  •  amLODIPine (NORVASC) 10 MG tablet, TAKE 1 TABLET EVERY DAY AS DIRECTED, Disp: 90 tablet, Rfl: 1  •  aspirin 81 MG EC tablet, Take 1 tablet by mouth Daily., Disp: 90 tablet, Rfl: 1  •  atorvastatin (LIPITOR) 40 MG tablet, TAKE 1 TABLET EVERY NIGHT, Disp: 90 tablet, Rfl: 1  •  B Complex Vitamins (VITAMIN B COMPLEX) tablet, Take 1 tablet by mouth daily., Disp: , Rfl:   •  calcium-vitamin D (OSCAL-500) 500-200 MG-UNIT per tablet, Take 1 tablet by mouth 2 (two) times a day., Disp: , Rfl:   •  CBD (cannabidiol) oral oil, Take 500 mL by mouth Daily., Disp: , Rfl:   •  Cholecalciferol (VITAMIN D3) 1000 UNITS capsule, Take 1 capsule by mouth daily., Disp: , Rfl:   •  metoprolol succinate XL (TOPROL-XL) 25 MG 24 hr tablet, TAKE 1 TABLET EVERY DAY, Disp: 90 tablet, Rfl: 1  •  Omega-3 Fatty Acids (FISH OIL PO), 1000 mg twice daily., Disp: , Rfl:   •  sertraline (ZOLOFT) 50 MG tablet, TAKE 1 TABLET EVERY DAY, Disp: 90 tablet, Rfl: 3  •  carBAMazepine XR (TEGretol  XR) 400 MG 12 hr tablet, Take 1 tablet by mouth 2 (Two) Times a Day., Disp: 180 tablet, Rfl: 1  •  pregabalin (Lyrica) 50 MG capsule, Take 1 capsule by mouth 2 (Two) Times a Day., Disp: 180 capsule, Rfl: 0    Current Facility-Administered Medications:   •  albuterol (PROVENTIL) nebulizer solution 0.083% 2.5 mg/3mL, 2.5 mg, Nebulization, Q6H PRN, Berta Julian, APRN, 2.5 mg at 11/20/18 1010    Allergies:   Allergies   Allergen Reactions   • Ace Inhibitors Cough   • Penicillins Other (See Comments)     Childhood allergy      Objective     Physical Exam:  Vital Signs:   Vitals:    04/29/21 1509   BP: 144/68   Pulse: 72   Temp: 97.5 °F (36.4 °C)   SpO2: 99%   Weight: 49.5 kg (109 lb 3.2 oz)   Height: 149.9 cm (59\")   PainSc: 0-No pain     Physical Exam  Vitals and nursing note reviewed. " Exam conducted with a chaperone present (daughter).   Pulmonary:      Effort: Pulmonary effort is normal.   Neurological:      Mental Status: She is oriented to person, place, and time. Mental status is at baseline.      Gait: Gait is intact.   Psychiatric:         Speech: Speech normal.       Neurologic Exam     Mental Status   Oriented to person, place, and time.   Attention: normal. Concentration: normal.   Speech: speech is normal   Level of consciousness: alert  Knowledge: good.   Normal comprehension.     Cranial Nerves   Cranial nerves II through XII intact.     Motor Exam   Muscle bulk: normal  Overall muscle tone: normal    Gait, Coordination, and Reflexes     Gait  Gait: normal    Tremor   Resting tremor: absent    Results Review:   I reviewed the patient's new clinical results.  I have reviewed the patient's other medical records to include, labs, radiology and referrals.     Assessment / Plan      Assessment/Plan:   Diagnoses and all orders for this visit:    1. Trigeminal neuralgia  -     carBAMazepine XR (TEGretol  XR) 400 MG 12 hr tablet; Take 1 tablet by mouth 2 (Two) Times a Day.  Dispense: 180 tablet; Refill: 1  -     pregabalin (Lyrica) 50 MG capsule; Take 1 capsule by mouth 2 (Two) Times a Day.  Dispense: 180 capsule; Refill: 0     Instructed patient that if GI discomfort persists, discontinue ASA. Follow with PCP in May, Dr. Bone in June.  Follow Up:   Return in about 6 weeks (around 6/10/2021) for Next scheduled follow up.     PETERSON Grullon  [facility]   AS THE PROVIDER, I PERSONALLY WORE PPE DURING ENTIRE FACE TO FACE ENCOUNTER IN CLINIC WITH THE PATIENT. PATIENT ALSO WORE PPE DURING ENTIRE FACE TO FACE ENCOUNTER EXCEPT FOR A MAX OF 30 SECONDS DURING NEUROLOGICAL EVALUATION OF CRANIAL NERVES AND THEN MASK WAS PLACED BACK OVER PATIENT FACE FOR REMAINDER OF VISIT. I WASHED MY HANDS BEFORE AND AFTER VISIT.      Please note that portions of this note may have been  completed with a voice recognition program. Efforts were made to edit the dictations, but occasionally words are mistranscribed.

## 2021-05-14 RX ORDER — METOPROLOL SUCCINATE 25 MG/1
TABLET, EXTENDED RELEASE ORAL
Qty: 90 TABLET | Refills: 1 | Status: SHIPPED | OUTPATIENT
Start: 2021-05-14 | End: 2021-10-07

## 2021-05-18 ENCOUNTER — OFFICE VISIT (OUTPATIENT)
Dept: FAMILY MEDICINE CLINIC | Facility: CLINIC | Age: 71
End: 2021-05-18

## 2021-05-18 VITALS
HEART RATE: 74 BPM | BODY MASS INDEX: 21.81 KG/M2 | RESPIRATION RATE: 16 BRPM | WEIGHT: 108 LBS | DIASTOLIC BLOOD PRESSURE: 72 MMHG | SYSTOLIC BLOOD PRESSURE: 122 MMHG | OXYGEN SATURATION: 99 % | TEMPERATURE: 97.1 F

## 2021-05-18 DIAGNOSIS — G50.0 TRIGEMINAL NEURALGIA: ICD-10-CM

## 2021-05-18 DIAGNOSIS — F32.1 MODERATE SINGLE CURRENT EPISODE OF MAJOR DEPRESSIVE DISORDER (HCC): ICD-10-CM

## 2021-05-18 DIAGNOSIS — M81.0 OSTEOPOROSIS, UNSPECIFIED OSTEOPOROSIS TYPE, UNSPECIFIED PATHOLOGICAL FRACTURE PRESENCE: ICD-10-CM

## 2021-05-18 DIAGNOSIS — K21.9 GASTROESOPHAGEAL REFLUX DISEASE WITHOUT ESOPHAGITIS: ICD-10-CM

## 2021-05-18 DIAGNOSIS — I10 ESSENTIAL HYPERTENSION: ICD-10-CM

## 2021-05-18 DIAGNOSIS — R00.0 TACHYCARDIA: ICD-10-CM

## 2021-05-18 DIAGNOSIS — R53.83 FATIGUE, UNSPECIFIED TYPE: ICD-10-CM

## 2021-05-18 DIAGNOSIS — R73.9 HYPERGLYCEMIA: ICD-10-CM

## 2021-05-18 DIAGNOSIS — E78.2 MIXED HYPERLIPIDEMIA: Primary | ICD-10-CM

## 2021-05-18 PROCEDURE — 99214 OFFICE O/P EST MOD 30 MIN: CPT | Performed by: NURSE PRACTITIONER

## 2021-05-18 RX ORDER — PREGABALIN 50 MG/1
50 CAPSULE ORAL 3 TIMES DAILY
Qty: 90 CAPSULE | Refills: 2 | Status: SHIPPED | OUTPATIENT
Start: 2021-05-18 | End: 2021-06-30 | Stop reason: SDUPTHER

## 2021-05-18 NOTE — PROGRESS NOTES
Subjective     Chief Complaint:    Chief Complaint   Patient presents with   • Hypertension       History of Present Illness:   Here for f/u.   Has trigmenial neuralgia.  Follows with neuro.  Currently on Tegretol, amitriptyline. Was recently started on lyrica. She is taking 50mg BID. She notes around 5pm she starts having worsening pain. Would like to increase. She is reaching out to neuro. she was also started on aspirin due to chronic vessel ischemic changes noted on MRI.  She notes aspirin is causing GI discomfort.  So severe she is unable to sleep.  Has hypertension and tachycardia.  Takes amlodipine and metoprolol. BP cuff at home reading high. Correlated here in office and is reading 60 points higher than office manual cuff  Has hyperlipidemia.  Takes statin.  Has osteoporosis.  Has completed 5 years of fosamax previously. Last DEXA was JAn 2021.   She feels tired. She is hungry all the time. She wants to eat all day. She feels like she may have diabetes.  She admits to eating chips and cookies.  She has been having some trouble with constipation. Has been drinking tea for constipation and that will help.   She has mild depression.  Does not want to increase medicine at this time.  Feels mood is relatively stable and she can handle instabilities on her own.    Review of Systems  Gen- No fevers, chills  CV- No chest pain, palpitations  Resp- No cough, dyspnea  GI- No N/V/D, abd pain  Neuro-No dizziness, headaches      I have reviewed and/or updated the patient's past medical, surgical, family, social history and problem list as appropriate.     Medications:    Current Outpatient Medications:   •  amitriptyline (ELAVIL) 10 MG tablet, TAKE 1 AND 1/2 TABLETS EVERY DAY  AT  DINNER, Disp: 135 tablet, Rfl: 1  •  amLODIPine (NORVASC) 10 MG tablet, TAKE 1 TABLET EVERY DAY AS DIRECTED, Disp: 90 tablet, Rfl: 1  •  atorvastatin (LIPITOR) 40 MG tablet, TAKE 1 TABLET EVERY NIGHT, Disp: 90 tablet, Rfl: 1  •  B Complex  Vitamins (VITAMIN B COMPLEX) tablet, Take 1 tablet by mouth daily., Disp: , Rfl:   •  calcium-vitamin D (OSCAL-500) 500-200 MG-UNIT per tablet, Take 1 tablet by mouth 2 (two) times a day., Disp: , Rfl:   •  carBAMazepine XR (TEGretol  XR) 400 MG 12 hr tablet, Take 1 tablet by mouth 2 (Two) Times a Day., Disp: 180 tablet, Rfl: 1  •  CBD (cannabidiol) oral oil, Take 500 mL by mouth Daily., Disp: , Rfl:   •  Cholecalciferol (VITAMIN D3) 1000 UNITS capsule, Take 1 capsule by mouth daily., Disp: , Rfl:   •  metoprolol succinate XL (TOPROL-XL) 25 MG 24 hr tablet, TAKE 1 TABLET EVERY DAY, Disp: 90 tablet, Rfl: 1  •  Omega-3 Fatty Acids (FISH OIL PO), 1000 mg twice daily., Disp: , Rfl:   •  pregabalin (Lyrica) 50 MG capsule, Take 1 capsule by mouth 2 (Two) Times a Day., Disp: 180 capsule, Rfl: 0  •  sertraline (ZOLOFT) 50 MG tablet, TAKE 1 TABLET EVERY DAY, Disp: 90 tablet, Rfl: 3    Current Facility-Administered Medications:   •  albuterol (PROVENTIL) nebulizer solution 0.083% 2.5 mg/3mL, 2.5 mg, Nebulization, Q6H PRN, Berta Julian N, APRN, 2.5 mg at 11/20/18 1010    Allergies:  Allergies   Allergen Reactions   • Ace Inhibitors Cough   • Penicillins Other (See Comments)     Childhood allergy        Objective     Vital Signs:   Vitals:    05/18/21 1130   BP: 122/72   Pulse: 74   Resp: 16   Temp: 97.1 °F (36.2 °C)   SpO2: 99%   Weight: 49 kg (108 lb)       Physical Exam:    Physical Exam  Vitals and nursing note reviewed.   Constitutional:       Appearance: Normal appearance. She is well-developed.   HENT:      Head: Normocephalic and atraumatic.   Eyes:      Pupils: Pupils are equal, round, and reactive to light.   Neck:      Vascular: No carotid bruit.   Cardiovascular:      Rate and Rhythm: Normal rate and regular rhythm.      Heart sounds: Normal heart sounds.   Pulmonary:      Effort: Pulmonary effort is normal.      Breath sounds: Normal breath sounds.   Abdominal:      General: Bowel sounds are normal. There is no  distension.      Palpations: Abdomen is soft.      Tenderness: There is no abdominal tenderness.   Musculoskeletal:      Cervical back: Neck supple.   Skin:     General: Skin is warm and dry.      Capillary Refill: Capillary refill takes less than 2 seconds.   Neurological:      General: No focal deficit present.      Mental Status: She is alert and oriented to person, place, and time.   Psychiatric:         Mood and Affect: Mood normal.         Behavior: Behavior normal.         Assessment / Plan     Assessment/Plan:   Problem List Items Addressed This Visit        Cardiac and Vasculature    Mixed hyperlipidemia - Primary    Relevant Medications    atorvastatin (LIPITOR) 40 MG tablet    Other Relevant Orders    Lipid Panel    Hypertension    Relevant Medications    amLODIPine (NORVASC) 10 MG tablet    metoprolol succinate XL (TOPROL-XL) 25 MG 24 hr tablet    Tachycardia       Gastrointestinal Abdominal     Gastroesophageal reflux disease       Mental Health    Moderate single current episode of major depressive disorder (CMS/HCC)    Relevant Medications    sertraline (ZOLOFT) 50 MG tablet    amitriptyline (ELAVIL) 10 MG tablet       Musculoskeletal and Injuries    Osteoporosis       Neuro    Trigeminal neuralgia    Relevant Medications    amitriptyline (ELAVIL) 10 MG tablet    carBAMazepine XR (TEGretol  XR) 400 MG 12 hr tablet    pregabalin (Lyrica) 50 MG capsule       Symptoms and Signs    Fatigue    Relevant Orders    Comprehensive Metabolic Panel    CBC (No Diff)    Vitamin B12    Folate    TSH Rfx On Abnormal To Free T4      Other Visit Diagnoses     Hyperglycemia        Relevant Orders    Hemoglobin A1c        --Orders as above  --We will reach out to neuro in regards with increasing her pregabalin.  I have advised her to stop aspirin due to severe GI discomfort.  Continue statin.  Continue regular exercise.  We had long discussion about dietary habits.  I encouraged her to make better food choices.  --Blood  pressure stable on current regimen.  Continue. Meds, diet, and lifestyle recommendation discussed at length. Home BP monitoring encouraged and appropriate intervals discussed.   --She does have osteoporosis.  Status post 5 years of Fosamax.  Repeat DEXA scan in 2 years.  --Continue current dose of Zoloft.  Mood discussed.   --We discussed constipation.  I feel it is fine for her to continue her tea.  I have asked her with assistance of daughter to upload ingredients to my chart for my review.  We discussed increasing fiber.    Follow up:  Follow-up in 6 months    Electronically signed by PETERSON Nguyen   05/18/2021 11:46 EDT      Please note that portions of this note may have been completed with a voice recognition program. Efforts were made to edit the dictations, but occasionally words are mistranscribed.

## 2021-05-19 LAB
ALBUMIN SERPL-MCNC: 4.5 G/DL (ref 3.5–5.2)
ALBUMIN/GLOB SERPL: 2 G/DL
ALP SERPL-CCNC: 91 U/L (ref 39–117)
ALT SERPL-CCNC: 19 U/L (ref 1–33)
AST SERPL-CCNC: 23 U/L (ref 1–32)
BILIRUB SERPL-MCNC: 0.3 MG/DL (ref 0–1.2)
BUN SERPL-MCNC: 8 MG/DL (ref 8–23)
BUN/CREAT SERPL: 13.6 (ref 7–25)
CALCIUM SERPL-MCNC: 9.3 MG/DL (ref 8.6–10.5)
CHLORIDE SERPL-SCNC: 102 MMOL/L (ref 98–107)
CHOLEST SERPL-MCNC: 242 MG/DL (ref 0–200)
CO2 SERPL-SCNC: 28.3 MMOL/L (ref 22–29)
CREAT SERPL-MCNC: 0.59 MG/DL (ref 0.57–1)
ERYTHROCYTE [DISTWIDTH] IN BLOOD BY AUTOMATED COUNT: 15.6 % (ref 12.3–15.4)
FOLATE SERPL-MCNC: 19 NG/ML (ref 4.78–24.2)
GLOBULIN SER CALC-MCNC: 2.2 GM/DL
GLUCOSE SERPL-MCNC: 92 MG/DL (ref 65–99)
HBA1C MFR BLD: 5.8 % (ref 4.8–5.6)
HCT VFR BLD AUTO: 34.8 % (ref 34–46.6)
HDLC SERPL-MCNC: 65 MG/DL (ref 40–60)
HGB BLD-MCNC: 10.8 G/DL (ref 12–15.9)
LDLC SERPL CALC-MCNC: 146 MG/DL (ref 0–100)
MCH RBC QN AUTO: 21.8 PG (ref 26.6–33)
MCHC RBC AUTO-ENTMCNC: 31 G/DL (ref 31.5–35.7)
MCV RBC AUTO: 70.2 FL (ref 79–97)
PLATELET # BLD AUTO: 303 10*3/MM3 (ref 140–450)
POTASSIUM SERPL-SCNC: 4.3 MMOL/L (ref 3.5–5.2)
PROT SERPL-MCNC: 6.7 G/DL (ref 6–8.5)
RBC # BLD AUTO: 4.96 10*6/MM3 (ref 3.77–5.28)
SODIUM SERPL-SCNC: 140 MMOL/L (ref 136–145)
TRIGL SERPL-MCNC: 175 MG/DL (ref 0–150)
TSH SERPL DL<=0.005 MIU/L-ACNC: 2.7 UIU/ML (ref 0.27–4.2)
VIT B12 SERPL-MCNC: 629 PG/ML (ref 211–946)
VLDLC SERPL CALC-MCNC: 31 MG/DL (ref 5–40)
WBC # BLD AUTO: 7.92 10*3/MM3 (ref 3.4–10.8)

## 2021-05-21 NOTE — PROGRESS NOTES
Labs show mild anemia.  Bettye can you see if we can add iron studies to her blood or has it been to many days?  I presume she is iron deficient.  She does not have diabetes.  A1c is 5.8.  This is consistent with prediabetes and is improved from 2 years ago.  Her cholesterol remains mildly elevated.  Continue current dose of atorvastatin.  Thyroid normal.  Vitamin B12 normal.

## 2021-05-25 DIAGNOSIS — D64.9 ANEMIA, UNSPECIFIED TYPE: Primary | ICD-10-CM

## 2021-06-10 ENCOUNTER — LAB (OUTPATIENT)
Dept: FAMILY MEDICINE CLINIC | Facility: CLINIC | Age: 71
End: 2021-06-10

## 2021-06-16 DIAGNOSIS — G50.0 TRIGEMINAL NEURALGIA: ICD-10-CM

## 2021-06-16 RX ORDER — AMITRIPTYLINE HYDROCHLORIDE 10 MG/1
TABLET, FILM COATED ORAL
Qty: 135 TABLET | Refills: 1 | Status: SHIPPED | OUTPATIENT
Start: 2021-06-16 | End: 2021-11-10

## 2021-06-17 ENCOUNTER — OFFICE VISIT (OUTPATIENT)
Dept: NEUROLOGY | Facility: CLINIC | Age: 71
End: 2021-06-17

## 2021-06-17 VITALS
OXYGEN SATURATION: 96 % | HEIGHT: 59 IN | HEART RATE: 67 BPM | SYSTOLIC BLOOD PRESSURE: 148 MMHG | BODY MASS INDEX: 22.09 KG/M2 | TEMPERATURE: 97.7 F | DIASTOLIC BLOOD PRESSURE: 62 MMHG | WEIGHT: 109.6 LBS

## 2021-06-17 DIAGNOSIS — G50.0 TRIGEMINAL NEURALGIA: Primary | ICD-10-CM

## 2021-06-17 PROCEDURE — 99213 OFFICE O/P EST LOW 20 MIN: CPT | Performed by: NURSE PRACTITIONER

## 2021-06-17 NOTE — PROGRESS NOTES
Follow Up Neurology Office Visit      Patient Name: Antonio Barnes    Referring Physician: No ref. provider found    Chief Complaint:    Chief Complaint   Patient presents with   • Follow-up     Patient in office to follow up on trigeminal neuralgia.         History of Present Illness: Antonio Barnes is a delightful 70 y.o. female who is here to follow up with Neurology for trigeminal neuralgia. She has responded very well to pregabalin (50mg TID), she has had near complete resolution of her pain. She continues to take Tegretol ER 400mg BID as well. She returned to [facility] for evaluation with Dr. Bone in May, she was offered surgical intervention but it was felt that as her symptoms are currently well controlled she would like to defer at this time. She states that she typically wakes late morning, takes first dose around noon, second dose around 5pm, and last dose around 10:30pm. She dose note that she has more pain around 5pm, but it is tolerable and much reduced from previous levels.     The following portions of the patient's history were reviewed and updated as appropriate: allergies, current medications, past family history, past medical history, past social history, past surgical history and problem list.    Subjective     Review of Systems:   Review of Systems   Constitutional: Negative for activity change and fatigue.   HENT: Negative for drooling and voice change.    Eyes: Negative for blurred vision, double vision, photophobia and visual disturbance.   Respiratory: Negative for shortness of breath.    Cardiovascular: Negative for chest pain and palpitations.   Gastrointestinal: Negative for nausea and vomiting.   Genitourinary: Negative for urinary incontinence.   Musculoskeletal: Negative for arthralgias, back pain, gait problem, myalgias and neck pain.   Allergic/Immunologic: Negative for immunocompromised state.   Neurological: Positive for weakness and numbness. Negative for dizziness, tremors,  seizures, syncope, facial asymmetry, speech difficulty, light-headedness, headache, memory problem and confusion.   Hematological: Does not bruise/bleed easily.   Psychiatric/Behavioral: Negative for decreased concentration, dysphoric mood, hallucinations, sleep disturbance, depressed mood and stress. The patient is not nervous/anxious.      Medications:     Current Outpatient Medications:   •  amitriptyline (ELAVIL) 10 MG tablet, TAKE 1 AND 1/2 TABLETS EVERY DAY AT DINNER, Disp: 135 tablet, Rfl: 1  •  amLODIPine (NORVASC) 10 MG tablet, TAKE 1 TABLET EVERY DAY AS DIRECTED, Disp: 90 tablet, Rfl: 1  •  atorvastatin (LIPITOR) 40 MG tablet, TAKE 1 TABLET EVERY NIGHT, Disp: 90 tablet, Rfl: 1  •  B Complex Vitamins (VITAMIN B COMPLEX) tablet, Take 1 tablet by mouth daily., Disp: , Rfl:   •  calcium-vitamin D (OSCAL-500) 500-200 MG-UNIT per tablet, Take 1 tablet by mouth 2 (two) times a day., Disp: , Rfl:   •  carBAMazepine XR (TEGretol  XR) 400 MG 12 hr tablet, Take 1 tablet by mouth 2 (Two) Times a Day., Disp: 180 tablet, Rfl: 1  •  Cholecalciferol (VITAMIN D3) 1000 UNITS capsule, Take 1 capsule by mouth daily., Disp: , Rfl:   •  metoprolol succinate XL (TOPROL-XL) 25 MG 24 hr tablet, TAKE 1 TABLET EVERY DAY, Disp: 90 tablet, Rfl: 1  •  pregabalin (Lyrica) 50 MG capsule, Take 1 capsule by mouth 3 (Three) Times a Day., Disp: 90 capsule, Rfl: 2  •  sertraline (ZOLOFT) 50 MG tablet, TAKE 1 TABLET EVERY DAY, Disp: 90 tablet, Rfl: 3    Current Facility-Administered Medications:   •  albuterol (PROVENTIL) nebulizer solution 0.083% 2.5 mg/3mL, 2.5 mg, Nebulization, Q6H PRN, Berta Julian, APRN, 2.5 mg at 11/20/18 1010    Allergies:   Allergies   Allergen Reactions   • Ace Inhibitors Cough   • Penicillins Other (See Comments)     Childhood allergy        Objective     Physical Exam:  Vital Signs:   Vitals:    06/17/21 1531   BP: 148/62   Pulse: 67   Temp: 97.7 °F (36.5 °C)   SpO2: 96%   Weight: 49.7 kg (109 lb 9.6 oz)  "  Height: 149.9 cm (59\")   PainSc: 0-No pain     Physical Exam  Vitals and nursing note reviewed.   Pulmonary:      Effort: Pulmonary effort is normal.   Skin:     General: Skin is warm.   Neurological:      Mental Status: She is oriented to person, place, and time. Mental status is at baseline.      Gait: Gait is intact.   Psychiatric:         Mood and Affect: Mood normal.         Speech: Speech normal.         Behavior: Behavior normal.         Thought Content: Thought content normal.         Judgment: Judgment normal.       Neurologic Exam     Mental Status   Oriented to person, place, and time.   Attention: normal. Concentration: normal.   Speech: speech is normal   Level of consciousness: alert  Knowledge: good.   Normal comprehension.     Cranial Nerves   Cranial nerves II through XII intact.     Motor Exam   Muscle bulk: normal  Overall muscle tone: normal    Gait, Coordination, and Reflexes     Gait  Gait: normal    Tremor   Resting tremor: absent    MRI ANGIOGRAM HEAD WO CONTRAST-   HISTORY: Headache, cluster/trigeminal     PROCEDURE: 3-D time-of-flight MRA images of the brain were performed.     FINDINGS: The constituent vessels of the [place] are patent  with no areas of significant stenosis or abrupt cutoff. There is no  evidence of an aneurysm or vascular malformation.     IMPRESSION:  Unremarkable MRA of the brain    MRI BRAIN W WO CONTRAST-   HISTORY: TGN, facial numbness; G50.0-Trigeminal neuralgia;  G50.1-Atypical facial pain     PROCEDURE: Multiplanar multisequence imaging of the brain was performed  both before and following the administration of 15 mL MultiHance  intravenous contrast.     COMPARISON: November 1, 2016.     FINDINGS: There is generalized cerebral volume loss. FLAIR  hyperintensities in the periventricular white matter of both cerebral  hemispheres is consistent with chronic small vessel ischemic change.  There is no mass, mass effect or midline shift. There is " no  hydrocephalus. There are no areas of restricted diffusion. There is no  pathologic contrast enhancement.     The midbrain, luis manuel, cerebellum and craniocervical junction are  unremarkable. The sella and pituitary gland are within normal limits.  The major intracranial vasculature demonstrates the expected flow  related signal. The paranasal sinuses are clear.     IMPRESSION:  Findings consistent with chronic small vessel ischemic  change with no acute intracranial abnormality.    Results Review:   I reviewed the patient's new clinical results.  I have reviewed the patient's other medical records to include, labs, radiology and referrals.   I reviewed the patient's new imaging results and agree with the interpretation.    Assessment / Plan      Assessment/Plan:   Diagnoses and all orders for this visit:    1. Trigeminal neuralgia (Primary)     Continue Lyrica and Tegretol. If evening pain becomes more bothersome, consider additional daytime tablet of Lyrica (noon or 5pm). If this is not helpful, we discussed that she may need to reconsider intervention with Dr. Bone.     Follow Up:   Return in about 3 months (around 9/17/2021).     PETERSON Grullon  [facility]   AS THE PROVIDER, I PERSONALLY WORE PPE DURING ENTIRE FACE TO FACE ENCOUNTER IN CLINIC WITH THE PATIENT. PATIENT ALSO WORE PPE DURING ENTIRE FACE TO FACE ENCOUNTER EXCEPT FOR A MAX OF 30 SECONDS DURING NEUROLOGICAL EVALUATION OF CRANIAL NERVES AND THEN MASK WAS PLACED BACK OVER PATIENT FACE FOR REMAINDER OF VISIT. I WASHED MY HANDS BEFORE AND AFTER VISIT.    Please note that portions of this note may have been completed with a voice recognition program. Efforts were made to edit the dictations, but occasionally words are mistranscribed.

## 2021-06-17 NOTE — PATIENT INSTRUCTIONS
Trigeminal Neuralgia    Trigeminal neuralgia is a nerve disorder that causes severe pain on one side of the face. The pain may last from a few seconds to several minutes. The pain is usually only on one side of the face.  Symptoms may occur for days, weeks, or months and then go away for months or years. The pain may return and be worse than before.  What are the causes?  This condition is caused by damage or pressure to a nerve in the head that is called the trigeminal nerve. An attack can be triggered by:  · Talking.  · Chewing.  · Putting on makeup.  · Washing your face.  · Shaving your face.  · Brushing your teeth.  · Touching your face.  What increases the risk?  You are more likely to develop this condition if you:  · Are 50 years of age or older.  · Are female.  What are the signs or symptoms?  The main symptom of this condition is severe pain in the:  · Jaw.  · Lips.  · Eyes.  · Nose.  · Scalp.  · Forehead.  · Face.  The pain may be:  · Intense.  · Stabbing.  · Electric.  · Shock-like.  How is this diagnosed?  This condition is diagnosed with a physical exam. A CT scan or an MRI may be done to rule out other conditions that can cause facial pain.  How is this treated?  This condition may be treated with:  · Avoiding the things that trigger your symptoms.  · Taking prescription medicines (anticonvulsants).  · Having surgery. This may be done in severe cases if other medical treatment does not provide relief.  · Having procedures such as ablation, thermal, or radiation therapy.  It may take up to one month for treatment to start relieving the pain.  Follow these instructions at home:  Managing pain  · Learn as much as you can about how to manage your pain. Ask your health care provider if a pain specialist would be helpful.  · Consider talking with a mental health care provider (psychologist) about how to cope with the pain.  · Consider joining a pain support group.  General instructions  · Take  over-the-counter and prescription medicines only as told by your health care provider.  · Avoid the things that trigger your symptoms. It may help to:  ? Chew on the unaffected side of your mouth.  ? Avoid touching your face.  ? Avoid blasts of hot or cold air.  · Follow your treatment plan as told by your health care provider. This may include:  ? Cognitive or behavioral therapy.  ? Gentle, regular exercise.  ? Meditation or yoga.  ? Aromatherapy.  · Keep all follow-up visits as told by your health care provider. You may need to be monitored closely to make sure treatment is working well for you.  Where to find more information  · Facial Pain Association: fpa-support.org  Contact a health care provider if:  · Your medicine is not helping your symptoms.  · You have side effects from the medicine used for treatment.  · You develop new, unexplained symptoms, such as:  ? Double vision.  ? Facial weakness.  ? Facial numbness.  ? Changes in hearing or balance.  · You feel depressed.  Get help right away if:  · Your pain is severe and is not getting better.  · You develop suicidal thoughts.  If you ever feel like you may hurt yourself or others, or have thoughts about taking your own life, get help right away. You can go to your nearest emergency department or call:  · Your local emergency services (911 in the [place]  · A suicide crisis helpline, such as the National Suicide Prevention Lifeline at 1-743.917.9229. This is open 24 hours a day.  Summary  · Trigeminal neuralgia is a nerve disorder that causes severe pain on one side of the face. The pain may last from a few seconds to several minutes.  · This condition is caused by damage or pressure to a nerve in the head that is called the trigeminal nerve.  · Treatment may include avoiding the things that trigger your symptoms, taking medicines, or having surgery or procedures. It may take up to one month for treatment to start relieving the pain.  · Avoid the things that  trigger your symptoms.  · Keep all follow-up visits as told by your health care provider. You may need to be monitored closely to make sure treatment is working well for you.  This information is not intended to replace advice given to you by your health care provider. Make sure you discuss any questions you have with your health care provider.  Document Revised: 11/04/2019 Document Reviewed: 11/04/2019  Elsevier Patient Education © 2021 [employer]

## 2021-06-30 DIAGNOSIS — G50.0 TRIGEMINAL NEURALGIA: ICD-10-CM

## 2021-06-30 RX ORDER — PREGABALIN 50 MG/1
50 CAPSULE ORAL 3 TIMES DAILY
Qty: 90 CAPSULE | Refills: 2 | Status: SHIPPED | OUTPATIENT
Start: 2021-06-30 | End: 2021-09-16 | Stop reason: SDUPTHER

## 2021-07-06 ENCOUNTER — TRANSCRIBE ORDERS (OUTPATIENT)
Dept: ADMINISTRATIVE | Facility: HOSPITAL | Age: 71
End: 2021-07-06

## 2021-07-06 DIAGNOSIS — Z12.31 ENCOUNTER FOR SCREENING MAMMOGRAM FOR MALIGNANT NEOPLASM OF BREAST: Primary | ICD-10-CM

## 2021-07-12 RX ORDER — DOXYCYCLINE HYCLATE 100 MG/1
100 CAPSULE ORAL 2 TIMES DAILY
Qty: 20 CAPSULE | Refills: 0 | Status: SHIPPED | OUTPATIENT
Start: 2021-07-12 | End: 2021-07-22

## 2021-08-02 ENCOUNTER — HOSPITAL ENCOUNTER (OUTPATIENT)
Dept: MAMMOGRAPHY | Facility: HOSPITAL | Age: 71
Discharge: HOME | End: 2021-08-02
Admitting: NURSE PRACTITIONER

## 2021-08-02 DIAGNOSIS — Z12.31 ENCOUNTER FOR SCREENING MAMMOGRAM FOR MALIGNANT NEOPLASM OF BREAST: ICD-10-CM

## 2021-08-02 PROCEDURE — 77063 BREAST TOMOSYNTHESIS BI: CPT

## 2021-08-02 PROCEDURE — 77067 SCR MAMMO BI INCL CAD: CPT

## 2021-08-02 PROCEDURE — 77067 SCR MAMMO BI INCL CAD: CPT | Performed by: RADIOLOGY

## 2021-08-02 PROCEDURE — 77063 BREAST TOMOSYNTHESIS BI: CPT | Performed by: RADIOLOGY

## 2021-08-06 RX ORDER — ATORVASTATIN CALCIUM 40 MG/1
TABLET, FILM COATED ORAL
Qty: 90 TABLET | Refills: 1 | Status: SHIPPED | OUTPATIENT
Start: 2021-08-06 | End: 2021-12-30

## 2021-08-19 ENCOUNTER — APPOINTMENT (OUTPATIENT)
Dept: MAMMOGRAPHY | Facility: HOSPITAL | Age: 71
End: 2021-08-19

## 2021-09-07 RX ORDER — AMLODIPINE BESYLATE 10 MG/1
TABLET ORAL
Qty: 90 TABLET | Refills: 1 | Status: SHIPPED | OUTPATIENT
Start: 2021-09-07 | End: 2021-11-12

## 2021-09-16 ENCOUNTER — OFFICE VISIT (OUTPATIENT)
Dept: NEUROLOGY | Facility: CLINIC | Age: 71
End: 2021-09-16

## 2021-09-16 VITALS
WEIGHT: 108.2 LBS | HEIGHT: 59 IN | SYSTOLIC BLOOD PRESSURE: 142 MMHG | TEMPERATURE: 97.1 F | DIASTOLIC BLOOD PRESSURE: 80 MMHG | BODY MASS INDEX: 21.81 KG/M2 | OXYGEN SATURATION: 98 % | HEART RATE: 69 BPM

## 2021-09-16 DIAGNOSIS — G50.0 TRIGEMINAL NEURALGIA: Primary | ICD-10-CM

## 2021-09-16 PROCEDURE — 99213 OFFICE O/P EST LOW 20 MIN: CPT | Performed by: NURSE PRACTITIONER

## 2021-09-16 RX ORDER — PREGABALIN 50 MG/1
50 CAPSULE ORAL 3 TIMES DAILY
Qty: 270 CAPSULE | Refills: 1 | Status: SHIPPED | OUTPATIENT
Start: 2021-09-16 | End: 2022-02-09 | Stop reason: SDUPTHER

## 2021-09-16 NOTE — PROGRESS NOTES
Follow Up Neurology Office Visit      Patient Name: Antonio Barnes    Referring Physician: No ref. provider found    Chief Complaint:    Chief Complaint   Patient presents with   • Follow-up     patient in office to follow up on tirgeminal neuralgia.        History of Present Illness: Antonio Barnes is a very pleasant 70 y.o. female who is here to follow up with Neurology for  Trigeminal neuralgia. She is accompanied by her daughter today. She continues to do well with Lyrica and carbamazepine for TGN, she rarely has significant pain. Her daughter has noticed a bit more fatigue and confusion, cites example of mother forgetting a story she had told her about a friend within a few hours. No safety concerns, she is consistent with her medications, arrived on time to appointment today.     The following portions of the patient's history were reviewed and updated as appropriate: allergies, current medications, past family history, past medical history, past social history, past surgical history and problem list.    Subjective     Review of Systems:   Review of Systems   Constitutional: Negative for activity change and fatigue.   HENT: Negative for drooling and voice change.    Eyes: Negative for blurred vision, double vision, photophobia and visual disturbance.   Respiratory: Negative for shortness of breath.    Cardiovascular: Negative for chest pain and palpitations.   Gastrointestinal: Negative for nausea and vomiting.   Genitourinary: Negative for urinary incontinence.   Musculoskeletal: Negative for arthralgias, back pain, gait problem, myalgias and neck pain.   Allergic/Immunologic: Negative for immunocompromised state.   Neurological: Positive for numbness. Negative for dizziness, tremors, seizures, syncope, facial asymmetry, speech difficulty, weakness, light-headedness, headache, memory problem and confusion.   Hematological: Does not bruise/bleed easily.   Psychiatric/Behavioral: Negative for decreased  "concentration, dysphoric mood, hallucinations, sleep disturbance, depressed mood and stress. The patient is not nervous/anxious.      Medications:     Current Outpatient Medications:   •  amitriptyline (ELAVIL) 10 MG tablet, TAKE 1 AND 1/2 TABLETS EVERY DAY AT DINNER, Disp: 135 tablet, Rfl: 1  •  amLODIPine (NORVASC) 10 MG tablet, TAKE 1 TABLET DAILY AS DIRECTED, Disp: 90 tablet, Rfl: 1  •  atorvastatin (LIPITOR) 40 MG tablet, TAKE 1 TABLET EVERY NIGHT, Disp: 90 tablet, Rfl: 1  •  calcium-vitamin D (OSCAL-500) 500-200 MG-UNIT per tablet, Take 1 tablet by mouth 2 (two) times a day., Disp: , Rfl:   •  carBAMazepine XR (TEGretol  XR) 400 MG 12 hr tablet, Take 1 tablet by mouth 2 (Two) Times a Day., Disp: 180 tablet, Rfl: 1  •  Cholecalciferol (VITAMIN D3) 1000 UNITS capsule, Take 1 capsule by mouth daily., Disp: , Rfl:   •  metoprolol succinate XL (TOPROL-XL) 25 MG 24 hr tablet, TAKE 1 TABLET EVERY DAY, Disp: 90 tablet, Rfl: 1  •  pregabalin (Lyrica) 50 MG capsule, Take 1 capsule by mouth 3 (Three) Times a Day., Disp: 270 capsule, Rfl: 1  •  sertraline (ZOLOFT) 50 MG tablet, TAKE 1 TABLET EVERY DAY, Disp: 90 tablet, Rfl: 3    Current Facility-Administered Medications:   •  albuterol (PROVENTIL) nebulizer solution 0.083% 2.5 mg/3mL, 2.5 mg, Nebulization, Q6H PRN, Berta Julian, APRN, 2.5 mg at 11/20/18 1010    Allergies:   Allergies   Allergen Reactions   • Ace Inhibitors Cough   • Penicillins Other (See Comments)     Childhood allergy        Objective     Physical Exam:  Vital Signs:   Vitals:    09/16/21 1534   BP: 142/80   BP Location: Right arm   Patient Position: Sitting   Cuff Size: Adult   Pulse: 69   Temp: 97.1 °F (36.2 °C)   SpO2: 98%   Weight: 49.1 kg (108 lb 3.2 oz)   Height: 149.9 cm (59\")   PainSc:   3   PainLoc: Face     Physical Exam  Vitals and nursing note reviewed.   Pulmonary:      Effort: Pulmonary effort is normal.   Neurological:      Mental Status: She is oriented to person, place, and time. " Mental status is at baseline.      Gait: Gait is intact.   Psychiatric:         Mood and Affect: Mood normal.         Speech: Speech normal.         Behavior: Behavior normal.         Thought Content: Thought content normal.         Judgment: Judgment normal.       Neurologic Exam     Mental Status   Oriented to person, place, and time.   Attention: normal. Concentration: normal.   Speech: speech is normal   Level of consciousness: alert  Knowledge: good.   Normal comprehension.     MMSE 28/30     Cranial Nerves   Cranial nerves II through XII intact.     Motor Exam   Muscle bulk: normal  Overall muscle tone: normal    Gait, Coordination, and Reflexes     Gait  Gait: normal    Tremor   Resting tremor: absent    Results Review:   I have reviewed the patient's other medical records to include, labs, radiology and referrals.     Assessment / Plan      Assessment/Plan:   Diagnoses and all orders for this visit:    1. Trigeminal neuralgia (Primary)  -     Basic Metabolic Panel; Future  -     pregabalin (Lyrica) 50 MG capsule; Take 1 capsule by mouth 3 (Three) Times a Day.  Dispense: 270 capsule; Refill: 1    I have ordered BMP to assess sodium level on carbamazepine. Continue Lyrica.    Follow Up:   Return in about 6 months (around 3/16/2022) for Next scheduled follow up.       PETERSON Grullon  [facility]       Please note that portions of this note may have been completed with a voice recognition program. Efforts were made to edit the dictations, but occasionally words are mistranscribed.

## 2021-09-18 DIAGNOSIS — G50.0 TRIGEMINAL NEURALGIA: ICD-10-CM

## 2021-09-21 RX ORDER — CARBAMAZEPINE 400 MG/1
TABLET, EXTENDED RELEASE ORAL
Qty: 180 TABLET | Refills: 1 | Status: SHIPPED | OUTPATIENT
Start: 2021-09-21 | End: 2022-02-15

## 2021-09-28 ENCOUNTER — LAB (OUTPATIENT)
Dept: FAMILY MEDICINE CLINIC | Facility: CLINIC | Age: 71
End: 2021-09-28

## 2021-09-29 LAB
BUN SERPL-MCNC: 12 MG/DL (ref 8–23)
BUN/CREAT SERPL: 19 (ref 7–25)
CALCIUM SERPL-MCNC: 9.3 MG/DL (ref 8.6–10.5)
CHLORIDE SERPL-SCNC: 101 MMOL/L (ref 98–107)
CO2 SERPL-SCNC: 27.1 MMOL/L (ref 22–29)
CREAT SERPL-MCNC: 0.63 MG/DL (ref 0.57–1)
GLUCOSE SERPL-MCNC: 95 MG/DL (ref 65–99)
POTASSIUM SERPL-SCNC: 4.3 MMOL/L (ref 3.5–5.2)
SODIUM SERPL-SCNC: 141 MMOL/L (ref 136–145)

## 2021-10-07 RX ORDER — METOPROLOL SUCCINATE 25 MG/1
TABLET, EXTENDED RELEASE ORAL
Qty: 90 TABLET | Refills: 1 | Status: SHIPPED | OUTPATIENT
Start: 2021-10-07 | End: 2022-03-08

## 2021-11-10 DIAGNOSIS — G50.0 TRIGEMINAL NEURALGIA: ICD-10-CM

## 2021-11-10 RX ORDER — AMITRIPTYLINE HYDROCHLORIDE 10 MG/1
TABLET, FILM COATED ORAL
Qty: 135 TABLET | Refills: 1 | Status: SHIPPED | OUTPATIENT
Start: 2021-11-10 | End: 2022-04-06

## 2021-11-12 DIAGNOSIS — I10 ESSENTIAL HYPERTENSION: Primary | ICD-10-CM

## 2021-11-12 RX ORDER — AMLODIPINE BESYLATE 10 MG/1
TABLET ORAL
Qty: 90 TABLET | Refills: 1 | Status: SHIPPED | OUTPATIENT
Start: 2021-11-12 | End: 2022-06-14 | Stop reason: SDUPTHER

## 2021-11-19 ENCOUNTER — OFFICE VISIT (OUTPATIENT)
Dept: FAMILY MEDICINE CLINIC | Facility: CLINIC | Age: 71
End: 2021-11-19

## 2021-11-19 VITALS
BODY MASS INDEX: 21.44 KG/M2 | SYSTOLIC BLOOD PRESSURE: 114 MMHG | HEART RATE: 62 BPM | HEIGHT: 60 IN | DIASTOLIC BLOOD PRESSURE: 76 MMHG | TEMPERATURE: 98.2 F | WEIGHT: 109.2 LBS | OXYGEN SATURATION: 98 %

## 2021-11-19 DIAGNOSIS — D56.9 THALASSEMIA, UNSPECIFIED TYPE: ICD-10-CM

## 2021-11-19 DIAGNOSIS — I10 ESSENTIAL HYPERTENSION: ICD-10-CM

## 2021-11-19 DIAGNOSIS — J34.89 SINUS PRESSURE: ICD-10-CM

## 2021-11-19 DIAGNOSIS — E78.2 MIXED HYPERLIPIDEMIA: ICD-10-CM

## 2021-11-19 DIAGNOSIS — G50.0 TRIGEMINAL NEURALGIA: ICD-10-CM

## 2021-11-19 DIAGNOSIS — D64.9 ANEMIA, UNSPECIFIED TYPE: ICD-10-CM

## 2021-11-19 DIAGNOSIS — R00.0 TACHYCARDIA: ICD-10-CM

## 2021-11-19 DIAGNOSIS — E55.9 VITAMIN D DEFICIENCY: ICD-10-CM

## 2021-11-19 DIAGNOSIS — M81.0 OSTEOPOROSIS, UNSPECIFIED OSTEOPOROSIS TYPE, UNSPECIFIED PATHOLOGICAL FRACTURE PRESENCE: ICD-10-CM

## 2021-11-19 DIAGNOSIS — K21.9 GASTROESOPHAGEAL REFLUX DISEASE WITHOUT ESOPHAGITIS: ICD-10-CM

## 2021-11-19 DIAGNOSIS — I10 PRIMARY HYPERTENSION: ICD-10-CM

## 2021-11-19 DIAGNOSIS — H81.10 BENIGN PAROXYSMAL POSITIONAL VERTIGO, UNSPECIFIED LATERALITY: ICD-10-CM

## 2021-11-19 DIAGNOSIS — Z00.00 MEDICARE ANNUAL WELLNESS VISIT, SUBSEQUENT: Primary | ICD-10-CM

## 2021-11-19 DIAGNOSIS — F32.1 MODERATE SINGLE CURRENT EPISODE OF MAJOR DEPRESSIVE DISORDER (HCC): ICD-10-CM

## 2021-11-19 DIAGNOSIS — R73.03 PRE-DIABETES: ICD-10-CM

## 2021-11-19 PROCEDURE — 1159F MED LIST DOCD IN RCRD: CPT | Performed by: NURSE PRACTITIONER

## 2021-11-19 PROCEDURE — 1126F AMNT PAIN NOTED NONE PRSNT: CPT | Performed by: NURSE PRACTITIONER

## 2021-11-19 PROCEDURE — G0439 PPPS, SUBSEQ VISIT: HCPCS | Performed by: NURSE PRACTITIONER

## 2021-11-19 PROCEDURE — 1170F FXNL STATUS ASSESSED: CPT | Performed by: NURSE PRACTITIONER

## 2021-11-19 RX ORDER — FLUTICASONE PROPIONATE 50 MCG
1 SPRAY, SUSPENSION (ML) NASAL DAILY
Qty: 18.2 ML | Refills: 3 | Status: SHIPPED | OUTPATIENT
Start: 2021-11-19 | End: 2022-03-14

## 2021-11-19 RX ORDER — DESVENLAFAXINE 25 MG/1
25 TABLET, EXTENDED RELEASE ORAL DAILY
Qty: 90 TABLET | Refills: 0 | Status: SHIPPED | OUTPATIENT
Start: 2021-11-19 | End: 2022-01-03

## 2021-11-19 RX ORDER — CHOLECALCIFEROL (VITAMIN D3) 125 MCG
5 CAPSULE ORAL
COMMUNITY

## 2021-11-19 NOTE — PATIENT INSTRUCTIONS
Essential Tremor  A tremor is trembling or shaking that a person cannot control. Most tremors affect the hands or arms. Tremors can also affect the head, vocal cords, legs, and other parts of the body. Essential tremor is a tremor without a known cause. Usually, it occurs while a person is trying to perform an action. It tends to get worse gradually as a person ages.  What are the causes?  The cause of this condition is not known.  What increases the risk?  You are more likely to develop this condition if:  · You have a family member with essential tremor.  · You are age 40 or older.  · You take certain medicines.  What are the signs or symptoms?  The main sign of a tremor is a rhythmic shaking of certain parts of your body that is uncontrolled and unintentional. You may:  · Have difficulty eating with a spoon or fork.  · Have difficulty writing.  · Nod your head up and down or side to side.  · Have a quivering voice.  The shaking may:  · Get worse over time.  · Come and go.  · Be more noticeable on one side of your body.  · Get worse due to stress, fatigue, caffeine, and extreme heat or cold.  How is this diagnosed?  This condition may be diagnosed based on:  · Your symptoms and medical history.  · A physical exam.  There is no single test to diagnose an essential tremor. However, your health care provider may order tests to rule out other causes of your condition. These may include:  · Blood and urine tests.  · Imaging studies of your brain, such as CT scan and MRI.  · A test that measures involuntary muscle movement (electromyogram).  How is this treated?  Treatment for essential tremor depends on the severity of the condition.  · Some tremors may go away without treatment.  · Mild tremors may not need treatment if they do not affect your day-to-day life.  · Severe tremors may need to be treated using one or more of the following options:  ? Medicines.  ? Lifestyle changes.  ? Occupational or physical  therapy.  Follow these instructions at home:  Lifestyle    · Do not use any products that contain nicotine or tobacco, such as cigarettes and e-cigarettes. If you need help quitting, ask your health care provider.  · Limit your caffeine intake as told by your health care provider.  · Try to get 8 hours of sleep each night.  · Find ways to manage your stress that fits your lifestyle and personality. Consider trying meditation or yoga.  · Try to anticipate stressful situations and allow extra time to manage them.  · If you are struggling emotionally with the effects of your tremor, consider working with a mental health provider.    General instructions  · Take over-the-counter and prescription medicines only as told by your health care provider.  · Avoid extreme heat and extreme cold.  · Keep all follow-up visits as told by your health care provider. This is important. Visits may include physical therapy visits.  Contact a health care provider if:  · You experience any changes in the location or intensity of your tremors.  · You start having a tremor after starting a new medicine.  · You have tremor with other symptoms, such as:  ? Numbness.  ? Tingling.  ? Pain.  ? Weakness.  · Your tremor gets worse.  · Your tremor interferes with your daily life.  · You feel down, blue, or sad for at least 2 weeks in a row.  · Worrying about your tremor and what other people think about you interferes with your everyday life functions, including relationships, work, or school.  Summary  · Essential tremor is a tremor without a known cause. Usually, it occurs when you are trying to perform an action.  · The cause of this condition is not known.  · The main sign of a tremor is a rhythmic shaking of certain parts of your body that is uncontrolled and unintentional.  · Treatment for essential tremor depends on the severity of the condition.  This information is not intended to replace advice given to you by your health care provider.  Make sure you discuss any questions you have with your health care provider.  Document Revised: 12/28/2018 Document Reviewed: 12/28/2018  Elsevier Patient Education © 2021 [employer]

## 2021-11-19 NOTE — PROGRESS NOTES
The ABCs of the Annual Wellness Visit  Subsequent Medicare Wellness Visit    Chief Complaint   Patient presents with   • Medicare Wellness-subsequent      Subjective    History of Present Illness:  Antonio Barnes is a 71 y.o. female who presents for a Subsequent Medicare Wellness Visit.    Has trigmenial neuralgia.  Follows with neuro.  Currently on Tegretol, amitriptyline, Lyrica.  Has hypertension and tachycardia.  Takes amlodipine and metoprolol.   Has hyperlipidemia.  Takes statin.  Has osteoporosis.  Has completed 5 years of fosamax previously. Last DEXA was JAn 2021.   She feels tired. She is hungry all the time. She wants to eat all day.   Worsening depression. Has been on zoloft for awhile. Would like to change meds.   Some PND, did have episode of vertigo a few weeks ago. Lasted about 30 hours. REsolved spontaensoudly  She has some pressure around her right eye  Sleeping well       The following portions of the patient's history were reviewed and   updated as appropriate: allergies, current medications, past family history, past medical history, past social history, past surgical history and problem list.    Compared to one year ago, the patient feels her physical   health is the same.    Compared to one year ago, the patient feels her mental   health is worse.    Recent Hospitalizations:  She was not admitted to the hospital during the last year.       Current Medical Providers:  Patient Care Team:  Ritu Chavez APRN as PCP - General (Nurse Practitioner)    Outpatient Medications Prior to Visit   Medication Sig Dispense Refill   • amitriptyline (ELAVIL) 10 MG tablet TAKE 1 AND 1/2 TABLETS EVERY DAY AT DINNER 135 tablet 1   • amLODIPine (NORVASC) 10 MG tablet TAKE 1 TABLET EVERY DAY AS DIRECTED 90 tablet 1   • atorvastatin (LIPITOR) 40 MG tablet TAKE 1 TABLET EVERY NIGHT 90 tablet 1   • calcium-vitamin D (OSCAL-500) 500-200 MG-UNIT per tablet Take 1 tablet by mouth 2 (two) times a day.     •  "carBAMazepine XR (TEGretol  XR) 400 MG 12 hr tablet TAKE 1 TABLET TWICE DAILY 180 tablet 1   • Cholecalciferol (VITAMIN D3) 1000 UNITS capsule Take 1 capsule by mouth daily.     • melatonin 5 MG tablet tablet Take 5 mg by mouth.     • metoprolol succinate XL (TOPROL-XL) 25 MG 24 hr tablet TAKE 1 TABLET EVERY DAY 90 tablet 1   • pregabalin (Lyrica) 50 MG capsule Take 1 capsule by mouth 3 (Three) Times a Day. 270 capsule 1   • sertraline (ZOLOFT) 50 MG tablet TAKE 1 TABLET EVERY DAY 90 tablet 3     Facility-Administered Medications Prior to Visit   Medication Dose Route Frequency Provider Last Rate Last Admin   • albuterol (PROVENTIL) nebulizer solution 0.083% 2.5 mg/3mL  2.5 mg Nebulization Q6H PRN Berta Julian, APRN   2.5 mg at 11/20/18 1010       No opioid medication identified on active medication list. I have reviewed chart for other potential  high risk medication/s and harmful drug interactions in the elderly.          Aspirin is not on active medication list.  Aspirin use is contraindicated for this patient due to: previous side effects, i.e. bruising, etc.  .    Patient Active Problem List   Diagnosis   • Anemia   • Constipation   • Fatigue   • Gastroesophageal reflux disease   • Mixed hyperlipidemia   • Hypertension   • Low back pain   • Neck pain   • Osteoporosis   • Thalassemia   • Trigeminal neuralgia   • Vitamin D deficiency   • Post-menopausal   • Tachycardia   • Moderate single current episode of major depressive disorder (HCC)     Advance Care Planning  Advance Directive is not on file.  ACP discussion was held with the patient during this visit. Patient does not have an advance directive, information provided.          Objective    Vitals:    11/19/21 1132   BP: 114/76   Pulse: 62   Temp: 98.2 °F (36.8 °C)   SpO2: 98%   Weight: 49.5 kg (109 lb 3.2 oz)   Height: 152.4 cm (60\")   PainSc: 0-No pain     BMI Readings from Last 1 Encounters:   11/19/21 21.33 kg/m²   BMI is within normal parameters. No " follow-up required.    Does the patient have evidence of cognitive impairment? No    Physical Exam  Vitals and nursing note reviewed. Exam conducted with a chaperone present.   Constitutional:       Appearance: She is well-developed.   HENT:      Head: Normocephalic and atraumatic.      Right Ear: Tympanic membrane, ear canal and external ear normal.      Left Ear: Tympanic membrane, ear canal and external ear normal.      Nose: Nose normal.      Mouth/Throat:      Pharynx: Uvula midline.   Eyes:      Pupils: Pupils are equal, round, and reactive to light.   Cardiovascular:      Rate and Rhythm: Normal rate and regular rhythm.      Heart sounds: Normal heart sounds. No murmur heard.  No friction rub. No gallop.    Pulmonary:      Effort: Pulmonary effort is normal.      Breath sounds: Normal breath sounds.   Abdominal:      General: Bowel sounds are normal.      Palpations: Abdomen is soft.      Tenderness: There is no abdominal tenderness.   Musculoskeletal:      Cervical back: Neck supple.   Lymphadenopathy:      Head:      Right side of head: No submental, submandibular, tonsillar, preauricular or posterior auricular adenopathy.      Left side of head: No submental, submandibular, tonsillar, preauricular or posterior auricular adenopathy.      Cervical: No cervical adenopathy.   Skin:     General: Skin is warm and dry.   Neurological:      Mental Status: She is alert and oriented to person, place, and time.   Psychiatric:         Behavior: Behavior normal.       Lab Results   Component Value Date    CHLPL 241 (H) 2021    TRIG 269 (H) 2021    HDL 53 2021     (H) 2021    VLDL 48 (H) 2021    HGBA1C 6.10 (H) 2021            HEALTH RISK ASSESSMENT    Smoking Status:  Social History     Tobacco Use   Smoking Status Former Smoker   • Packs/day: 1.00   • Years: 20.00   • Pack years: 20.00   • Types: Cigarettes   • Quit date:    • Years since quittin.9   Smokeless  Tobacco Never Used   Tobacco Comment    Quit smoking July 2007     Alcohol Consumption:  Social History     Substance and Sexual Activity   Alcohol Use No     Fall Risk Screen:    RAMINADI Fall Risk Assessment was completed, and patient is at LOW risk for falls.Assessment completed on:11/19/2021    Depression Screening:  PHQ-2/PHQ-9 Depression Screening 11/19/2021   Little interest or pleasure in doing things 0   Feeling down, depressed, or hopeless 0   Total Score 0       Health Habits and Functional and Cognitive Screening:  Functional & Cognitive Status 11/19/2021   Do you have difficulty preparing food and eating? No   Do you have difficulty bathing yourself, getting dressed or grooming yourself? No   Do you have difficulty using the toilet? No   Do you have difficulty moving around from place to place? No   Do you have trouble with steps or getting out of a bed or a chair? No   Current Diet Well Balanced Diet   Dental Exam Up to date   Eye Exam Up to date   Exercise (times per week) 7 times per week   Current Exercises Include Stair Step Machine   Current Exercise Activities Include -   Do you need help using the phone?  No   Are you deaf or do you have serious difficulty hearing?  No   Do you need help with transportation? No   Do you need help shopping? No   Do you need help preparing meals?  No   Do you need help with housework?  No   Do you need help with laundry? No   Do you need help taking your medications? No   Do you need help managing money? No   Do you ever drive or ride in a car without wearing a seat belt? No   Have you felt unusual stress, anger or loneliness in the last month? -   Who do you live with? -   Have you been bothered in the last four weeks by sexual problems? -   Do you have difficulty concentrating, remembering or making decisions? -       Age-appropriate Screening Schedule:  Refer to the list below for future screening recommendations based on patient's age, sex and/or medical  conditions. Orders for these recommended tests are listed in the plan section. The patient has been provided with a written plan.    Health Maintenance   Topic Date Due   • TDAP/TD VACCINES (2 - Tdap) 02/27/2025 (Originally 1/20/2009)   • LIPID PANEL  11/19/2022   • DXA SCAN  01/25/2023   • MAMMOGRAM  08/02/2023   • INFLUENZA VACCINE  Completed   • ZOSTER VACCINE  Addressed              Assessment/Plan   CMS Preventative Services Quick Reference  Risk Factors Identified During Encounter  Depression/Dysphoria  The above risks/problems have been discussed with the patient.  Follow up actions/plans if indicated are seen below in the Assessment/Plan Section.  Pertinent information has been shared with the patient in the After Visit Summary.    Diagnoses and all orders for this visit:    1. Medicare annual wellness visit, subsequent (Primary)    2. Essential hypertension    3. Mixed hyperlipidemia  -     Lipid Panel    4. Trigeminal neuralgia    5. Osteoporosis, unspecified osteoporosis type, unspecified pathological fracture presence    6. Moderate single current episode of major depressive disorder (HCC)    7. Thalassemia, unspecified type    8. Gastroesophageal reflux disease without esophagitis    9. Primary hypertension  -     Comprehensive Metabolic Panel  -     Cancel: CBC Auto Differential    10. Vitamin D deficiency    11. Tachycardia    12. Anemia, unspecified type    13. Pre-diabetes  -     Hemoglobin A1c    14. Sinus pressure  -     fluticasone (Flonase) 50 MCG/ACT nasal spray; 1 spray into the nostril(s) as directed by provider Daily.  Dispense: 18.2 mL; Refill: 3    Other orders  -     Desvenlafaxine Succinate ER (Pristiq) 25 MG tablet sustained-release 24 hour; Take 1 tablet by mouth Daily.  Dispense: 90 tablet; Refill: 0  -     CBC & Differential  -     Manual Differential      --Medicare wellness visit performed.  Encouraged to eat in moderation.  Work on snacking.  Remain physically active.  --Keep  follow-up with neurology and trigeminal neuralgia clinic  --Stop Zoloft.  Start Pristiq for depression and anxiety  --Her dizziness episode sounds like BPPV.  It has resolved.  Exam reassuring today.  Meclizine provided just in case she has another recurrence.  --Labs as above    Follow Up:   Return in about 6 weeks (around 12/31/2021).     An After Visit Summary and PPPS were made available to the patient.

## 2021-11-22 LAB
ALBUMIN SERPL-MCNC: 4.7 G/DL (ref 3.5–5.2)
ALBUMIN/GLOB SERPL: 2 G/DL
ALP SERPL-CCNC: 108 U/L (ref 39–117)
ALT SERPL-CCNC: 14 U/L (ref 1–33)
AST SERPL-CCNC: 21 U/L (ref 1–32)
BASOPHILS # BLD MANUAL: 0.13 10*3/MM3 (ref 0–0.2)
BASOPHILS NFR BLD MANUAL: 2 % (ref 0–1.5)
BILIRUB SERPL-MCNC: 0.2 MG/DL (ref 0–1.2)
BUN SERPL-MCNC: 7 MG/DL (ref 8–23)
BUN/CREAT SERPL: 11.5 (ref 7–25)
CALCIUM SERPL-MCNC: 9.2 MG/DL (ref 8.6–10.5)
CHLORIDE SERPL-SCNC: 103 MMOL/L (ref 98–107)
CHOLEST SERPL-MCNC: 241 MG/DL (ref 0–200)
CO2 SERPL-SCNC: 29.5 MMOL/L (ref 22–29)
CREAT SERPL-MCNC: 0.61 MG/DL (ref 0.57–1)
DIFFERENTIAL COMMENT: ABNORMAL
EOSINOPHIL # BLD MANUAL: 0.26 10*3/MM3 (ref 0–0.4)
EOSINOPHIL NFR BLD MANUAL: 4 % (ref 0.3–6.2)
ERYTHROCYTE [DISTWIDTH] IN BLOOD BY AUTOMATED COUNT: 15.7 % (ref 12.3–15.4)
GLOBULIN SER CALC-MCNC: 2.4 GM/DL
GLUCOSE SERPL-MCNC: 103 MG/DL (ref 65–99)
HBA1C MFR BLD: 6.1 % (ref 4.8–5.6)
HCT VFR BLD AUTO: 38.3 % (ref 34–46.6)
HDLC SERPL-MCNC: 53 MG/DL (ref 40–60)
HGB BLD-MCNC: 11.5 G/DL (ref 12–15.9)
LDLC SERPL CALC-MCNC: 140 MG/DL (ref 0–100)
LYMPHOCYTES # BLD MANUAL: 2.23 10*3/MM3 (ref 0.7–3.1)
LYMPHOCYTES NFR BLD MANUAL: 34.3 % (ref 19.6–45.3)
MCH RBC QN AUTO: 22.1 PG (ref 26.6–33)
MCHC RBC AUTO-ENTMCNC: 30 G/DL (ref 31.5–35.7)
MCV RBC AUTO: 73.7 FL (ref 79–97)
MONOCYTES # BLD MANUAL: 0.2 10*3/MM3 (ref 0.1–0.9)
MONOCYTES NFR BLD MANUAL: 3 % (ref 5–12)
NEUTROPHILS # BLD MANUAL: 3.68 10*3/MM3 (ref 1.7–7)
NEUTROPHILS NFR BLD MANUAL: 56.6 % (ref 42.7–76)
NRBC BLD AUTO-RTO: 0 /100 WBC (ref 0–0.2)
PLATELET # BLD AUTO: 308 10*3/MM3 (ref 140–450)
PLATELET BLD QL SMEAR: ABNORMAL
POTASSIUM SERPL-SCNC: 4.4 MMOL/L (ref 3.5–5.2)
PROT SERPL-MCNC: 7.1 G/DL (ref 6–8.5)
RBC # BLD AUTO: 5.2 10*6/MM3 (ref 3.77–5.28)
RBC MORPH BLD: ABNORMAL
SODIUM SERPL-SCNC: 143 MMOL/L (ref 136–145)
TRIGL SERPL-MCNC: 269 MG/DL (ref 0–150)
VLDLC SERPL CALC-MCNC: 48 MG/DL (ref 5–40)
WBC # BLD AUTO: 6.51 10*3/MM3 (ref 3.4–10.8)

## 2021-11-30 RX ORDER — MECLIZINE HCL 12.5 MG/1
12.5 TABLET ORAL 3 TIMES DAILY PRN
Qty: 30 TABLET | Refills: 1 | Status: SHIPPED | OUTPATIENT
Start: 2021-11-30 | End: 2022-02-15

## 2021-12-30 RX ORDER — ATORVASTATIN CALCIUM 40 MG/1
TABLET, FILM COATED ORAL
Qty: 90 TABLET | Refills: 1 | Status: SHIPPED | OUTPATIENT
Start: 2021-12-30 | End: 2022-05-26

## 2022-01-03 ENCOUNTER — OFFICE VISIT (OUTPATIENT)
Dept: FAMILY MEDICINE CLINIC | Facility: CLINIC | Age: 72
End: 2022-01-03

## 2022-01-03 VITALS
DIASTOLIC BLOOD PRESSURE: 80 MMHG | SYSTOLIC BLOOD PRESSURE: 130 MMHG | HEART RATE: 63 BPM | WEIGHT: 109.6 LBS | OXYGEN SATURATION: 100 % | BODY MASS INDEX: 22.09 KG/M2 | HEIGHT: 59 IN | TEMPERATURE: 97.7 F

## 2022-01-03 DIAGNOSIS — F32.1 MODERATE SINGLE CURRENT EPISODE OF MAJOR DEPRESSIVE DISORDER: Primary | ICD-10-CM

## 2022-01-03 PROCEDURE — 99213 OFFICE O/P EST LOW 20 MIN: CPT | Performed by: NURSE PRACTITIONER

## 2022-01-03 RX ORDER — DESVENLAFAXINE SUCCINATE 50 MG/1
50 TABLET, EXTENDED RELEASE ORAL DAILY
Qty: 90 TABLET | Refills: 1 | Status: SHIPPED | OUTPATIENT
Start: 2022-01-03 | End: 2022-02-15

## 2022-01-03 NOTE — PROGRESS NOTES
Subjective     Chief Complaint:    Chief Complaint   Patient presents with   • Follow-up   • Depression       History of Present Illness:   Here to follow-up on depression.  6 weeks ago she was transitioned from sertraline to Pristiq.  She has not noticed any improvement in depression.  For a few days when she stopped sertraline depression got much worse.  However that resolved.  She denies any side effect from medication.  Continues to sleep well.    Review of Systems  Gen- No fevers, chills  CV- No chest pain, palpitations  Resp- No cough, dyspnea  GI- No N/V/D, abd pain  Neuro-No dizziness, headaches      I have reviewed and/or updated the patient's past medical, surgical, family, social history and problem list as appropriate.     Medications:    Current Outpatient Medications:   •  amitriptyline (ELAVIL) 10 MG tablet, TAKE 1 AND 1/2 TABLETS EVERY DAY AT DINNER, Disp: 135 tablet, Rfl: 1  •  amLODIPine (NORVASC) 10 MG tablet, TAKE 1 TABLET EVERY DAY AS DIRECTED, Disp: 90 tablet, Rfl: 1  •  atorvastatin (LIPITOR) 40 MG tablet, TAKE 1 TABLET EVERY NIGHT, Disp: 90 tablet, Rfl: 1  •  calcium-vitamin D (OSCAL-500) 500-200 MG-UNIT per tablet, Take 1 tablet by mouth 2 (two) times a day., Disp: , Rfl:   •  carBAMazepine XR (TEGretol  XR) 400 MG 12 hr tablet, TAKE 1 TABLET TWICE DAILY, Disp: 180 tablet, Rfl: 1  •  Cholecalciferol (VITAMIN D3) 1000 UNITS capsule, Take 1 capsule by mouth daily., Disp: , Rfl:   •  desvenlafaxine (Pristiq) 50 MG 24 hr tablet, Take 1 tablet by mouth Daily., Disp: 90 tablet, Rfl: 1  •  fluticasone (Flonase) 50 MCG/ACT nasal spray, 1 spray into the nostril(s) as directed by provider Daily., Disp: 18.2 mL, Rfl: 3  •  meclizine (ANTIVERT) 12.5 MG tablet, Take 1 tablet by mouth 3 (Three) Times a Day As Needed for Dizziness., Disp: 30 tablet, Rfl: 1  •  melatonin 5 MG tablet tablet, Take 5 mg by mouth., Disp: , Rfl:   •  metoprolol succinate XL (TOPROL-XL) 25 MG 24 hr tablet, TAKE 1 TABLET  "EVERY DAY, Disp: 90 tablet, Rfl: 1  •  pregabalin (Lyrica) 50 MG capsule, Take 1 capsule by mouth 3 (Three) Times a Day., Disp: 270 capsule, Rfl: 1    Current Facility-Administered Medications:   •  albuterol (PROVENTIL) nebulizer solution 0.083% 2.5 mg/3mL, 2.5 mg, Nebulization, Q6H PRN, Eliel, Berta N, APRN, 2.5 mg at 11/20/18 1010    Allergies:  Allergies   Allergen Reactions   • Ace Inhibitors Cough   • Penicillins Other (See Comments)     Childhood allergy        Objective     Vital Signs:   Vitals:    01/03/22 1350   BP: 130/80   Pulse: 63   Temp: 97.7 °F (36.5 °C)   SpO2: 100%   Weight: 49.7 kg (109 lb 9.6 oz)   Height: 149.9 cm (59\")   PainSc: 0-No pain     Body mass index is 22.14 kg/m².    Physical Exam:    Physical Exam  Vitals and nursing note reviewed.   Constitutional:       Appearance: Normal appearance. She is well-developed.   HENT:      Head: Normocephalic and atraumatic.   Eyes:      Pupils: Pupils are equal, round, and reactive to light.   Cardiovascular:      Rate and Rhythm: Normal rate and regular rhythm.      Heart sounds: Normal heart sounds.   Pulmonary:      Effort: Pulmonary effort is normal.      Breath sounds: Normal breath sounds.   Abdominal:      General: Bowel sounds are normal. There is no distension.      Palpations: Abdomen is soft.      Tenderness: There is no abdominal tenderness.   Musculoskeletal:      Cervical back: Neck supple.   Skin:     General: Skin is warm and dry.      Capillary Refill: Capillary refill takes less than 2 seconds.   Neurological:      General: No focal deficit present.      Mental Status: She is alert and oriented to person, place, and time.   Psychiatric:         Mood and Affect: Mood normal.         Behavior: Behavior normal.         Assessment / Plan     Assessment/Plan:   Problem List Items Addressed This Visit        Mental Health    Moderate single current episode of major depressive disorder (HCC) - Primary    Relevant Medications    " amitriptyline (ELAVIL) 10 MG tablet    desvenlafaxine (Pristiq) 50 MG 24 hr tablet        --Increase Pristiq to 50 mg.  New prescription sent.  Okay to double up on 25 mg tablets that she has at home.  I will see her in 3 months.  She has been encouraged to call me in 4 weeks if she is not having improvement in medication and can titrate up from there.    Follow up:  Return in about 3 months (around 4/3/2022).    Electronically signed by PETERSON Nguyen   01/03/2022 14:23 EST      Please note that portions of this note may have been completed with a voice recognition program. Efforts were made to edit the dictations, but occasionally words are mistranscribed.

## 2022-02-09 DIAGNOSIS — G50.0 TRIGEMINAL NEURALGIA: ICD-10-CM

## 2022-02-09 RX ORDER — PREGABALIN 50 MG/1
50 CAPSULE ORAL 3 TIMES DAILY
Qty: 270 CAPSULE | Refills: 1 | Status: SHIPPED | OUTPATIENT
Start: 2022-02-09 | End: 2022-07-15

## 2022-02-14 DIAGNOSIS — G50.0 TRIGEMINAL NEURALGIA: ICD-10-CM

## 2022-02-15 ENCOUNTER — OFFICE VISIT (OUTPATIENT)
Dept: FAMILY MEDICINE CLINIC | Facility: CLINIC | Age: 72
End: 2022-02-15

## 2022-02-15 VITALS
DIASTOLIC BLOOD PRESSURE: 78 MMHG | HEIGHT: 60 IN | TEMPERATURE: 98.7 F | BODY MASS INDEX: 20.58 KG/M2 | OXYGEN SATURATION: 97 % | HEART RATE: 83 BPM | WEIGHT: 104.8 LBS | SYSTOLIC BLOOD PRESSURE: 165 MMHG

## 2022-02-15 DIAGNOSIS — R07.89 CHEST TIGHTNESS: ICD-10-CM

## 2022-02-15 DIAGNOSIS — E55.9 VITAMIN D DEFICIENCY: ICD-10-CM

## 2022-02-15 DIAGNOSIS — R73.03 PRE-DIABETES: ICD-10-CM

## 2022-02-15 DIAGNOSIS — I10 PRIMARY HYPERTENSION: ICD-10-CM

## 2022-02-15 DIAGNOSIS — R09.81 NASAL CONGESTION: ICD-10-CM

## 2022-02-15 DIAGNOSIS — R94.31 EKG ABNORMALITIES: ICD-10-CM

## 2022-02-15 DIAGNOSIS — D56.9 THALASSEMIA, UNSPECIFIED TYPE: ICD-10-CM

## 2022-02-15 DIAGNOSIS — R53.83 FATIGUE, UNSPECIFIED TYPE: ICD-10-CM

## 2022-02-15 DIAGNOSIS — F32.1 MODERATE SINGLE CURRENT EPISODE OF MAJOR DEPRESSIVE DISORDER: Primary | ICD-10-CM

## 2022-02-15 PROCEDURE — 99214 OFFICE O/P EST MOD 30 MIN: CPT | Performed by: NURSE PRACTITIONER

## 2022-02-15 PROCEDURE — U0004 COV-19 TEST NON-CDC HGH THRU: HCPCS | Performed by: NURSE PRACTITIONER

## 2022-02-15 PROCEDURE — U0005 INFEC AGEN DETEC AMPLI PROBE: HCPCS | Performed by: NURSE PRACTITIONER

## 2022-02-15 RX ORDER — CARBAMAZEPINE 400 MG/1
TABLET, EXTENDED RELEASE ORAL
Qty: 180 TABLET | Refills: 1 | Status: SHIPPED | OUTPATIENT
Start: 2022-02-15 | End: 2022-07-12

## 2022-02-15 RX ORDER — ESCITALOPRAM OXALATE 10 MG/1
10 TABLET ORAL DAILY
Qty: 30 TABLET | Refills: 0 | Status: SHIPPED | OUTPATIENT
Start: 2022-02-15 | End: 2022-03-14 | Stop reason: SDUPTHER

## 2022-02-15 NOTE — PROGRESS NOTES
Subjective     Chief Complaint:    Chief Complaint   Patient presents with   • Follow-up   • Depression       History of Present Illness:   Here with worsening depression.  She has refractory depression that worsened after her  passed away a few years ago.  2 months ago she was started on Pristiq.  This was about 4 weeks ago.  She has not noticed improvement and in fact feels worse.  She is having crying episodes in the morning.  For the past 2 weeks she has been exhausted.  Does not have energy to do her normal activities.  Historically has been able to walk on the elliptical some but most recently cannot.  She does not have energy to do house chores.  Wants to lay in bed all day and sleep.  Has been having chest tightness.  Did have upper respiratory runny nose and congestion without fever.  Denies change in taste or smell.  Did have episode of chest heaviness and felt nauseous and tired and had to sit down.      Review of Systems  Gen- No fevers, chills  CV- No chest pain, palpitations  Resp- No cough, dyspnea  GI- No N/V/D, abd pain  Neuro-No dizziness, headaches      I have reviewed and/or updated the patient's past medical, surgical, family, social history and problem list as appropriate.     Medications:    Current Outpatient Medications:   •  amitriptyline (ELAVIL) 10 MG tablet, TAKE 1 AND 1/2 TABLETS EVERY DAY AT DINNER, Disp: 135 tablet, Rfl: 1  •  amLODIPine (NORVASC) 10 MG tablet, TAKE 1 TABLET EVERY DAY AS DIRECTED, Disp: 90 tablet, Rfl: 1  •  atorvastatin (LIPITOR) 40 MG tablet, TAKE 1 TABLET EVERY NIGHT, Disp: 90 tablet, Rfl: 1  •  calcium-vitamin D (OSCAL-500) 500-200 MG-UNIT per tablet, Take 1 tablet by mouth 2 (two) times a day., Disp: , Rfl:   •  Cholecalciferol (VITAMIN D3) 1000 UNITS capsule, Take 1 capsule by mouth daily., Disp: , Rfl:   •  fluticasone (Flonase) 50 MCG/ACT nasal spray, 1 spray into the nostril(s) as directed by provider Daily., Disp: 18.2 mL, Rfl: 3  •  melatonin 5  "MG tablet tablet, Take 5 mg by mouth., Disp: , Rfl:   •  metoprolol succinate XL (TOPROL-XL) 25 MG 24 hr tablet, TAKE 1 TABLET EVERY DAY, Disp: 90 tablet, Rfl: 1  •  pregabalin (Lyrica) 50 MG capsule, Take 1 capsule by mouth 3 (Three) Times a Day., Disp: 270 capsule, Rfl: 1  •  carBAMazepine XR (TEGretol  XR) 400 MG 12 hr tablet, TAKE 1 TABLET TWICE DAILY, Disp: 180 tablet, Rfl: 1  •  escitalopram (Lexapro) 10 MG tablet, Take 1 tablet by mouth Daily., Disp: 30 tablet, Rfl: 0    Current Facility-Administered Medications:   •  albuterol (PROVENTIL) nebulizer solution 0.083% 2.5 mg/3mL, 2.5 mg, Nebulization, Q6H PRN, Berta Julian N, APRN, 2.5 mg at 11/20/18 1010    Allergies:  Allergies   Allergen Reactions   • Ace Inhibitors Cough   • Penicillins Other (See Comments)     Childhood allergy        Objective     Vital Signs:   Vitals:    02/15/22 1417 02/15/22 1525   BP: 160/95 165/78   Pulse: 83    Temp: 98.7 °F (37.1 °C)    SpO2: 97%    Weight: 47.5 kg (104 lb 12.8 oz)    Height: 152.4 cm (60\")    PainSc: 0-No pain      Body mass index is 20.47 kg/m².    Physical Exam:    Physical Exam  Constitutional:       Appearance: She is well-developed.   HENT:      Head: Normocephalic and atraumatic.      Right Ear: Tympanic membrane, ear canal and external ear normal.      Left Ear: Tympanic membrane, ear canal and external ear normal.      Nose: Nose normal.      Mouth/Throat:      Pharynx: Uvula midline.   Eyes:      Pupils: Pupils are equal, round, and reactive to light.   Cardiovascular:      Rate and Rhythm: Normal rate and regular rhythm.      Heart sounds: Normal heart sounds. No murmur heard.  No friction rub. No gallop.    Pulmonary:      Effort: Pulmonary effort is normal.      Breath sounds: Normal breath sounds.   Abdominal:      General: Bowel sounds are normal.      Palpations: Abdomen is soft.      Tenderness: There is no abdominal tenderness.   Musculoskeletal:      Cervical back: Neck supple. "   Lymphadenopathy:      Head:      Right side of head: No submental, submandibular, tonsillar, preauricular or posterior auricular adenopathy.      Left side of head: No submental, submandibular, tonsillar, preauricular or posterior auricular adenopathy.      Cervical: No cervical adenopathy.   Skin:     General: Skin is warm and dry.   Neurological:      Mental Status: She is alert and oriented to person, place, and time.   Psychiatric:         Behavior: Behavior normal.       ECG 12 Lead    Date/Time: 2/17/2022 9:38 PM  Performed by: Ritu Chavez APRN  Authorized by: Ritu Chavez APRN   Comparison: compared with previous ECG   Rate: normal  Conduction: conduction normal  ST Depression: aVF, V3, II and V1    Clinical impression: abnormal EKG  Comments: Worsening ST depression when compared with prior EKG but nothing acute                Assessment / Plan     Assessment/Plan:   Problem List Items Addressed This Visit        Cardiac and Vasculature    Hypertension    Relevant Medications    metoprolol succinate XL (TOPROL-XL) 25 MG 24 hr tablet    amLODIPine (NORVASC) 10 MG tablet    Other Relevant Orders    COVID-19 PCR, [facility], NP SWAB IN [facility] V[facility]AL TRANSPORT MEDIA/ORAL SWISH 24-30 HR TAT - Swab, Nasopharynx (Completed)       Endocrine and Metabolic    Vitamin D deficiency    Relevant Orders    Vitamin D 25 Hydroxy (Completed)       Hematology and Neoplasia    Thalassemia    Relevant Orders    CBC Auto Differential       Mental Health    Moderate single current episode of major depressive disorder (HCC) - Primary    Relevant Medications    amitriptyline (ELAVIL) 10 MG tablet    escitalopram (Lexapro) 10 MG tablet       Symptoms and Signs    Fatigue    Relevant Orders    ECG 12 Lead    COVID-19 PCR, [facility], NP SWAB IN [facility] VIRAL TRANSPORT MEDIA/ORAL SWISH 24-30 HR TAT - Swab, Nasopharynx (Completed)    CBC Auto Differential    Comprehensive Metabolic Panel (Completed)    TSH Rfx On Abnormal To  Free T4 (Completed)    Vitamin B12 (Completed)    Folate (Completed)    Ambulatory Referral to Cardiology      Other Visit Diagnoses     Chest tightness        Relevant Orders    ECG 12 Lead    COVID-19 PCR, [facility], NP SWAB IN [facility]AL TRANSPORT MEDIA/ORAL SWISH 24-30 HR TAT - Swab, Nasopharynx (Completed)    Stress Test With PET Myocardial Perfusion    Ambulatory Referral to Cardiology    Nasal congestion        Relevant Orders    COVID-19 PCR, [facility], NP SWAB IN [facility]AL TRANSPORT MEDIA/ORAL SWISH 24-30 HR TAT - Swab, Nasopharynx (Completed)    Pre-diabetes        Relevant Orders    Hemoglobin A1c (Completed)    EKG abnormalities        Relevant Orders    Ambulatory Referral to Cardiology        --Stop Pristiq as depression appears much worse.  Will trial Lexapro with close follow-up.  She declines counseling  --Check labs as above  --EKG reviewed and discussed with Dr. Fermin.  He recommends stress test with MPS.  Order placed.  We will also put in referral to see him.  She is to continue aspirin and statin.  --We will check for COVID-19 due to fatigue and nasal symptoms.  This could also explain her chest tightness.  --Exam reassuring      Follow up:  4 weeks    Electronically signed by PETERSON Nguyen   02/15/2022 15:11 EST      Please note that portions of this note may have been completed with a voice recognition program. Efforts were made to edit the dictations, but occasionally words are mistranscribed.

## 2022-02-16 LAB
25(OH)D3+25(OH)D2 SERPL-MCNC: 38.2 NG/ML (ref 30–100)
ALBUMIN SERPL-MCNC: 4.5 G/DL (ref 3.7–4.7)
ALBUMIN/GLOB SERPL: 1.4 {RATIO} (ref 1.2–2.2)
ALP SERPL-CCNC: 172 IU/L (ref 44–121)
ALT SERPL-CCNC: 20 IU/L (ref 0–32)
AST SERPL-CCNC: 27 IU/L (ref 0–40)
BASOPHILS # BLD AUTO: 0 X10E3/UL (ref 0–0.2)
BASOPHILS NFR BLD AUTO: 0 %
BILIRUB SERPL-MCNC: <0.2 MG/DL (ref 0–1.2)
BUN SERPL-MCNC: 7 MG/DL (ref 8–27)
BUN/CREAT SERPL: 13 (ref 12–28)
CALCIUM SERPL-MCNC: 9.7 MG/DL (ref 8.7–10.3)
CHLORIDE SERPL-SCNC: 99 MMOL/L (ref 96–106)
CO2 SERPL-SCNC: 24 MMOL/L (ref 20–29)
CREAT SERPL-MCNC: 0.55 MG/DL (ref 0.57–1)
EOSINOPHIL # BLD AUTO: 0.4 X10E3/UL (ref 0–0.4)
EOSINOPHIL NFR BLD AUTO: 4 %
ERYTHROCYTE [DISTWIDTH] IN BLOOD BY AUTOMATED COUNT: 15.5 % (ref 11.7–15.4)
FOLATE SERPL-MCNC: >20 NG/ML
GLOBULIN SER CALC-MCNC: 3.2 G/DL (ref 1.5–4.5)
GLUCOSE SERPL-MCNC: 106 MG/DL (ref 65–99)
HBA1C MFR BLD: 6.3 % (ref 4.8–5.6)
HCT VFR BLD AUTO: 37.8 % (ref 34–46.6)
HGB BLD-MCNC: 11.9 G/DL (ref 11.1–15.9)
IMM GRANULOCYTES # BLD AUTO: 0 X10E3/UL (ref 0–0.1)
IMM GRANULOCYTES NFR BLD AUTO: 0 %
LYMPHOCYTES # BLD AUTO: 2.2 X10E3/UL (ref 0.7–3.1)
LYMPHOCYTES NFR BLD AUTO: 23 %
MCH RBC QN AUTO: 21.9 PG (ref 26.6–33)
MCHC RBC AUTO-ENTMCNC: 31.5 G/DL (ref 31.5–35.7)
MCV RBC AUTO: 70 FL (ref 79–97)
MONOCYTES # BLD AUTO: 0.9 X10E3/UL (ref 0.1–0.9)
MONOCYTES NFR BLD AUTO: 9 %
NEUTROPHILS # BLD AUTO: 6.1 X10E3/UL (ref 1.4–7)
NEUTROPHILS NFR BLD AUTO: 64 %
PLATELET # BLD AUTO: 537 X10E3/UL (ref 150–450)
POTASSIUM SERPL-SCNC: 5.1 MMOL/L (ref 3.5–5.2)
PROT SERPL-MCNC: 7.7 G/DL (ref 6–8.5)
RBC # BLD AUTO: 5.43 X10E6/UL (ref 3.77–5.28)
SARS-COV-2 RNA NOSE QL NAA+PROBE: NOT DETECTED
SODIUM SERPL-SCNC: 141 MMOL/L (ref 134–144)
TSH SERPL DL<=0.005 MIU/L-ACNC: 3.52 UIU/ML (ref 0.45–4.5)
VIT B12 SERPL-MCNC: 633 PG/ML (ref 232–1245)
WBC # BLD AUTO: 9.7 X10E3/UL (ref 3.4–10.8)

## 2022-02-17 PROCEDURE — 93005 ELECTROCARDIOGRAM TRACING: CPT | Performed by: NURSE PRACTITIONER

## 2022-02-22 NOTE — PROGRESS NOTES
Please call and let patient know that her labs showed persistent prediabetes with an A1c of 6.3.  She was not anemic.  No cause for fatigue on labs.  Her Covid test was negative.  How is she currently feeling?

## 2022-02-28 ENCOUNTER — OFFICE VISIT (OUTPATIENT)
Dept: CARDIOLOGY | Facility: CLINIC | Age: 72
End: 2022-02-28

## 2022-02-28 VITALS
RESPIRATION RATE: 18 BRPM | DIASTOLIC BLOOD PRESSURE: 74 MMHG | WEIGHT: 105 LBS | BODY MASS INDEX: 20.62 KG/M2 | HEART RATE: 73 BPM | SYSTOLIC BLOOD PRESSURE: 146 MMHG | HEIGHT: 60 IN | OXYGEN SATURATION: 98 %

## 2022-02-28 DIAGNOSIS — I10 PRIMARY HYPERTENSION: ICD-10-CM

## 2022-02-28 DIAGNOSIS — R00.2 PALPITATIONS: ICD-10-CM

## 2022-02-28 DIAGNOSIS — E78.2 MIXED HYPERLIPIDEMIA: ICD-10-CM

## 2022-02-28 DIAGNOSIS — R07.89 CHEST PAIN, ATYPICAL: Primary | ICD-10-CM

## 2022-02-28 PROCEDURE — 99204 OFFICE O/P NEW MOD 45 MIN: CPT | Performed by: INTERNAL MEDICINE

## 2022-02-28 NOTE — PROGRESS NOTES
"     [facility] Cardiology OP Consult Note    Antonio Barnes  [id number]  2022    Referred By: Ritu Chavez APRN    Chief Complaint: Chest pain    History of Present Illness:   Mrs. Antonio Barnes is a 71 y.o. female who presents to the Cardiology Clinic for evaluation of chest pain.  The patient has a past medical history significant for hypertension, hyperlipidemia, and depression.  She does not have any significant past cardiac history.  She presents the cardiology clinic today for evaluation of chest discomfort.  The patient reports that approximately 3 weeks ago she began experiencing a \"chest tightness.\"  The chest discomfort was associated with nasal congestion, cough, and fatigue.  At that time, she was evaluated by her PCP and tested negative for COVID-19.  An ECG showed sinus rhythm with nonspecific ST changes.  A stress test was ordered, however the patient deferred stress test at that time.  She was subsequently referred to cardiology for further recommendations.  Today, the patient reports continued improvement in her chest discomfort.  She reports her chest discomfort is nearly resolved.  She has started exercising again, and he can exercise up to 30 minutes on the elliptical without significant difficulty.  She does report mild residual exertional dyspnea and fatigue.  She does report resolution of her nasal congestion.  No subjective fevers or chills.  She denies any association of her chest pain with exertion.  Finally, she reports occasional episodes of palpitations described as a \"racing heart rate.\"  The palpitations appear to be chronic, proceeding her illness 3 weeks ago.  She reports 1 episode approximately every 3 days.  No associated dizziness, lightheadedness, or presyncopal symptoms.  No history of syncope.  No other specific complaints today.    Past Cardiac Testin. Last Coronary Angio: None  2. Prior Stress Testing: Exercise MPS    1.  No evidence of " inducible ischemia   2.  Hyperdynamic LVEF  3. Last Echo: None  4. Prior Holter Monitor: None    Review of Systems:   Review of Systems   Constitutional: Positive for fatigue. Negative for activity change, appetite change, chills, diaphoresis, fever, unexpected weight gain and unexpected weight loss.   Eyes: Negative for blurred vision and double vision.   Respiratory: Positive for chest tightness and shortness of breath. Negative for cough and wheezing.    Cardiovascular: Positive for palpitations. Negative for chest pain and leg swelling.   Gastrointestinal: Negative for abdominal pain, anal bleeding, blood in stool and GERD.   Endocrine: Negative for cold intolerance and heat intolerance.   Genitourinary: Negative for hematuria.   Neurological: Negative for dizziness, syncope, weakness and light-headedness.   Hematological: Does not bruise/bleed easily.   Psychiatric/Behavioral: Negative for depressed mood and stress. The patient is not nervous/anxious.        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        Past Surgical History:   Past Surgical History:   Procedure Laterality Date   • APPENDECTOMY     • BREAST BIOPSY Right 1994   • BREAST BIOPSY Left 07/2019    stereo - neg   • COLONOSCOPY      Colonoscopy (Fiberoptic)   • MASTECTOMY Right 1994   • TONSILLECTOMY     • TONSILLECTOMY         Family History:   Family History   Problem Relation Age of Onset   • Hypertension Mother    • Kidney disease Mother    • Heart attack Father    • Breast cancer Maternal Aunt         AGE UNKNOWN   • Ovarian cancer Neg Hx         Social History:   Social History     Socioeconomic History   • Marital status:    Tobacco Use   • Smoking status: Former Smoker     Packs/day: 1.00     Years: 20.00     Pack years: 20.00     Types: Cigarettes     Quit date: 2007     Years since quitting: 15.1   • Smokeless tobacco: Never Used   • Tobacco comment: Quit smoking July 2007   Vaping Use   • Vaping Use: Never used   Substance and Sexual Activity   • Alcohol use: No   • Drug use: No   • Sexual activity: Not Currently     Birth control/protection: Abstinence       Medications:     Current Outpatient Medications:   •  amitriptyline (ELAVIL) 10 MG tablet, TAKE 1 AND 1/2 TABLETS EVERY DAY AT DINNER, Disp: 135 tablet, Rfl: 1  •  amLODIPine (NORVASC) 10 MG tablet, TAKE 1 TABLET EVERY DAY AS DIRECTED, Disp: 90 tablet, Rfl: 1  •  atorvastatin (LIPITOR) 40 MG tablet, TAKE 1 TABLET EVERY NIGHT, Disp: 90 tablet, Rfl: 1  •  calcium-vitamin D (OSCAL-500) 500-200 MG-UNIT per tablet, Take 1 tablet by mouth 2 (two) times a day., Disp: , Rfl:   •  carBAMazepine XR (TEGretol  XR) 400 MG 12 hr tablet, TAKE 1 TABLET TWICE DAILY, Disp: 180 tablet, Rfl: 1  •  Cholecalciferol (VITAMIN D3) 1000 UNITS capsule, Take 1 capsule by mouth daily., Disp: , Rfl:   •  escitalopram (Lexapro) 10 MG tablet, Take 1 tablet by mouth Daily., Disp: 30 tablet, Rfl: 0  •  fluticasone (Flonase) 50 MCG/ACT nasal spray, 1 spray into the nostril(s) as directed by provider Daily., Disp: 18.2 mL, Rfl: 3  •  melatonin 5 MG tablet tablet, Take 5 mg by mouth., Disp: , Rfl:   •  metoprolol succinate XL (TOPROL-XL) 25 MG 24 hr tablet, TAKE 1 TABLET EVERY DAY, Disp: 90 tablet, Rfl: 1  •  pregabalin (Lyrica) 50 MG capsule, Take 1 capsule by mouth 3 (Three) Times a Day., Disp: 270 capsule, Rfl: 1    Current Facility-Administered Medications:   •  albuterol (PROVENTIL) nebulizer solution 0.083% 2.5 mg/3mL, 2.5 mg, Nebulization, Q6H PRN, Berta Julian N, APRN, 2.5 mg at 11/20/18 1010    Allergies:  "  Allergies   Allergen Reactions   • Ace Inhibitors Cough   • Penicillins Other (See Comments)     Childhood allergy        Physical Exam:  Vital Signs:   Vitals:    02/28/22 0942 02/28/22 0949   BP: 140/78 146/74   BP Location: Left arm Right arm   Patient Position: Sitting Sitting   Cuff Size: Adult Adult   Pulse: 73    Resp: 18    SpO2: 98%    Weight: 47.6 kg (105 lb)    Height: 152.4 cm (60\")    PainSc: 0-No pain        Physical Exam  Constitutional:       General: She is not in acute distress.     Appearance: Normal appearance. She is well-developed. She is not diaphoretic.   HENT:      Head: Normocephalic and atraumatic.   Eyes:      General: No scleral icterus.     Pupils: Pupils are equal, round, and reactive to light.   Neck:      Trachea: No tracheal deviation.   Cardiovascular:      Rate and Rhythm: Normal rate and regular rhythm.      Heart sounds: Normal heart sounds. No murmur heard.  No friction rub. No gallop.       Comments: Normal JVD.  Pulmonary:      Effort: Pulmonary effort is normal. No respiratory distress.      Breath sounds: Normal breath sounds. No stridor. No wheezing or rales.   Chest:      Chest wall: No tenderness.   Abdominal:      General: Bowel sounds are normal. There is no distension.      Palpations: Abdomen is soft.      Tenderness: There is no abdominal tenderness. There is no guarding or rebound.   Musculoskeletal:         General: No swelling. Normal range of motion.      Cervical back: Neck supple. No tenderness.   Lymphadenopathy:      Cervical: No cervical adenopathy.   Skin:     General: Skin is warm and dry.      Findings: No erythema.   Neurological:      General: No focal deficit present.      Mental Status: She is alert and oriented to person, place, and time.   Psychiatric:         Mood and Affect: Mood normal.         Behavior: Behavior normal.         Results Review:   I reviewed the patient's new clinical results.    ECG 2/15/2022: Sinus rhythm at 84 bpm.  Normal " axis.  Nonspecific ST changes.    Assessment / Plan:     1. Chest pain, atypical   --No significant past cardiac history, however the patient does have cardiovascular risk factors  --Recent episode of chest tightness is atypical in character, symptoms most suggestive of pleuritic discomfort in the setting of possible viral URI  --Recent ECG shows nonspecific ST changes, no definitive evidence of ischemia  --Given chest discomfort in the setting of cardiovascular risk factors, will proceed with exercise MPS to further rule out ischemia  --If MPS unremarkable, suspect symptoms likely noncardiac and potentially related to viral respiratory infection  --Follow-up in 1 month to review results of cardiac testing    2. Palpitations  --Occasional episodes of palpitations, of unclear etiology  --Recent ECG showed normal atrioventricular conduction  --If palpitations persistent, will consider outpatient cardiac monitoring at the time of next follow-up    3. Primary hypertension  --Hypertensive today, however BP appears to be generally controlled on prior checks  --If hypertensive on next follow-up, would consider up titration of metoprolol    4. Mixed hyperlipidemia  --Lipid profile in 11/21 showed , HDL 53, triglycerides 269  --Continue statin      Follow Up:   Return in 4 weeks (on 3/28/2022), or if symptoms worsen or fail to improve.      Thank you for allowing me to participate in the care of your patient. Please to not hesitate to contact me with additional questions or concerns.     VALENTIN Fermin MD  Interventional Cardiology   02/28/2022  09:57 EST

## 2022-03-08 RX ORDER — METOPROLOL SUCCINATE 25 MG/1
TABLET, EXTENDED RELEASE ORAL
Qty: 90 TABLET | Refills: 1 | Status: SHIPPED | OUTPATIENT
Start: 2022-03-08 | End: 2022-03-14 | Stop reason: SDUPTHER

## 2022-03-14 ENCOUNTER — OFFICE VISIT (OUTPATIENT)
Dept: FAMILY MEDICINE CLINIC | Facility: CLINIC | Age: 72
End: 2022-03-14

## 2022-03-14 VITALS
BODY MASS INDEX: 20.46 KG/M2 | WEIGHT: 104.2 LBS | HEIGHT: 60 IN | OXYGEN SATURATION: 98 % | TEMPERATURE: 97.9 F | DIASTOLIC BLOOD PRESSURE: 80 MMHG | SYSTOLIC BLOOD PRESSURE: 160 MMHG | HEART RATE: 71 BPM

## 2022-03-14 DIAGNOSIS — I10 PRIMARY HYPERTENSION: Primary | ICD-10-CM

## 2022-03-14 DIAGNOSIS — F32.1 MODERATE SINGLE CURRENT EPISODE OF MAJOR DEPRESSIVE DISORDER: ICD-10-CM

## 2022-03-14 PROCEDURE — 99214 OFFICE O/P EST MOD 30 MIN: CPT | Performed by: NURSE PRACTITIONER

## 2022-03-14 RX ORDER — ESCITALOPRAM OXALATE 10 MG/1
10 TABLET ORAL DAILY
Qty: 30 TABLET | Refills: 0 | Status: SHIPPED | OUTPATIENT
Start: 2022-03-14 | End: 2022-03-14 | Stop reason: SDUPTHER

## 2022-03-14 RX ORDER — METOPROLOL SUCCINATE 50 MG/1
50 TABLET, EXTENDED RELEASE ORAL DAILY
Qty: 90 TABLET | Refills: 1 | Status: SHIPPED | OUTPATIENT
Start: 2022-03-14 | End: 2022-08-08

## 2022-03-14 RX ORDER — DESVENLAFAXINE 25 MG/1
25 TABLET, EXTENDED RELEASE ORAL DAILY
COMMUNITY
End: 2022-03-14

## 2022-03-14 RX ORDER — ESCITALOPRAM OXALATE 10 MG/1
10 TABLET ORAL DAILY
Qty: 90 TABLET | Refills: 1 | Status: SHIPPED | OUTPATIENT
Start: 2022-03-14 | End: 2022-04-07 | Stop reason: SDUPTHER

## 2022-03-16 ENCOUNTER — OFFICE VISIT (OUTPATIENT)
Dept: NEUROLOGY | Facility: CLINIC | Age: 72
End: 2022-03-16

## 2022-03-16 VITALS
OXYGEN SATURATION: 96 % | WEIGHT: 105 LBS | BODY MASS INDEX: 20.62 KG/M2 | DIASTOLIC BLOOD PRESSURE: 70 MMHG | SYSTOLIC BLOOD PRESSURE: 160 MMHG | HEIGHT: 60 IN | TEMPERATURE: 97.1 F | HEART RATE: 56 BPM

## 2022-03-16 DIAGNOSIS — Z51.81 ENCOUNTER FOR THERAPEUTIC DRUG LEVEL MONITORING: ICD-10-CM

## 2022-03-16 DIAGNOSIS — G50.0 TRIGEMINAL NEURALGIA: Primary | ICD-10-CM

## 2022-03-16 PROCEDURE — 99213 OFFICE O/P EST LOW 20 MIN: CPT | Performed by: NURSE PRACTITIONER

## 2022-03-16 NOTE — PROGRESS NOTES
Follow Up Neurology Office Visit      Patient Name: Antonio Barnes    Referring Physician: No ref. provider found    Chief Complaint:    Chief Complaint   Patient presents with   • Follow-up     Pt in office to follow up on trigeminal neuralgia       History of Present Illness: Antonio Barnes is a very pleasant 71 y.o. female who is here to follow up with Neurology for trigeminal neuralgia.  Reports symptoms are 70% controlled, continues taking Tegretol twice daily and Lyrica 3 times daily.    States that symptoms are worse in the evening, may occasionally forget mid day dose of Lyrica.  She admits she usually takes her medicine around 10 AM, 5 PM, and again at 10 PM.    MBS is numbness in mid face and nose    The following portions of the patient's history were reviewed and updated as appropriate: allergies, current medications, past family history, past medical history, past social history, past surgical history and problem list.    Subjective     Review of Systems:   Review of Systems   HENT: Negative for facial swelling, hearing loss, sinus pressure and trouble swallowing.           Facial pain/numbness     Neurological: Positive for numbness.   All other systems reviewed and are negative.    Medications:     Current Outpatient Medications:   •  amitriptyline (ELAVIL) 10 MG tablet, TAKE 1 AND 1/2 TABLETS EVERY DAY AT DINNER, Disp: 135 tablet, Rfl: 1  •  amLODIPine (NORVASC) 10 MG tablet, TAKE 1 TABLET EVERY DAY AS DIRECTED, Disp: 90 tablet, Rfl: 1  •  atorvastatin (LIPITOR) 40 MG tablet, TAKE 1 TABLET EVERY NIGHT, Disp: 90 tablet, Rfl: 1  •  calcium-vitamin D (OSCAL-500) 500-200 MG-UNIT per tablet, Take 1 tablet by mouth 2 (two) times a day., Disp: , Rfl:   •  carBAMazepine XR (TEGretol  XR) 400 MG 12 hr tablet, TAKE 1 TABLET TWICE DAILY, Disp: 180 tablet, Rfl: 1  •  Cholecalciferol (VITAMIN D3) 1000 UNITS capsule, Take 1 capsule by mouth daily., Disp: , Rfl:   •  escitalopram (Lexapro) 10 MG tablet, Take  "1 tablet by mouth Daily., Disp: 90 tablet, Rfl: 1  •  melatonin 5 MG tablet tablet, Take 5 mg by mouth., Disp: , Rfl:   •  metoprolol succinate XL (TOPROL-XL) 50 MG 24 hr tablet, Take 1 tablet by mouth Daily., Disp: 90 tablet, Rfl: 1  •  pregabalin (Lyrica) 50 MG capsule, Take 1 capsule by mouth 3 (Three) Times a Day., Disp: 270 capsule, Rfl: 1    Current Facility-Administered Medications:   •  albuterol (PROVENTIL) nebulizer solution 0.083% 2.5 mg/3mL, 2.5 mg, Nebulization, Q6H PRN, Eliel, Berta N, APRN, 2.5 mg at 11/20/18 1010    Allergies:   Allergies   Allergen Reactions   • Ace Inhibitors Cough   • Penicillins Other (See Comments)     Childhood allergy        Objective     Physical Exam:  Vital Signs:   Vitals:    03/16/22 1432   BP: 160/70   Pulse: 56   Temp: 97.1 °F (36.2 °C)   SpO2: 96%   Weight: 47.6 kg (105 lb)   Height: 152.4 cm (60\")   PainSc: 0-No pain     Physical Exam  Vitals and nursing note reviewed.   Constitutional:       Appearance: Normal appearance.   Skin:     General: Skin is warm.   Neurological:      Mental Status: She is oriented to person, place, and time. Mental status is at baseline.      Gait: Gait is intact.      Deep Tendon Reflexes: Strength normal.   Psychiatric:         Mood and Affect: Mood normal.         Speech: Speech normal.         Behavior: Behavior normal.         Thought Content: Thought content normal.         Judgment: Judgment normal.       Neurologic Exam     Mental Status   Oriented to person, place, and time.   Attention: normal. Concentration: normal.   Speech: speech is normal   Level of consciousness: alert  Knowledge: good.   Normal comprehension.     Cranial Nerves   Cranial nerves II through XII intact.     Motor Exam   Muscle bulk: normal  Overall muscle tone: normal    Strength   Strength 5/5 throughout.     Gait, Coordination, and Reflexes     Gait  Gait: normal    Tremor   Resting tremor: absent    Results Review:   I reviewed the patient's new clinical " results.  I have reviewed the patient's other medical records to include, labs, radiology and referrals.     Assessment / Plan      Assessment/Plan:   Diagnoses and all orders for this visit:    1. Trigeminal neuralgia (Primary)  -     Urine Drug Screen - Urine, Clean Catch; Future    2. Encounter for therapeutic drug level monitoring   -     Urine Drug Screen - Urine, Clean Catch; Future    Discussed adjusting medication to more consistent schedule to alleviate worsening evening pain.  I suggested she take Lyrica 3 times daily at 10 AM, 2-3 PM, and again around 8 PM to provide consistent dosing schedule.  We also discussed using higher dose of Lyrica in midday for better symptom relief, however patient desires to minimize somnolence and balance difficulties so we will defer at this time  As part of this patient's treatment plan I am prescribing controlled substances. The patient has been made aware of appropriate use of such medications, including potential risk of somnolence, limited ability to drive and/or work safely, and potential for dependence or overdose. It has also been made clear that these medications are for use by the patient only, without concomitant use of alcohol or other substances unless prescribed. Keep out of reach of children.  Prem report has been reviewed. If this is going to be prescribed long term, [facility] Controlled Substance Agreement Contract has also been read and signed by patient and myself.    Follow Up:   Return in about 6 months (around 9/16/2022) for Next scheduled follow up.     Isha Jamison APRVICENTE  [facility]   AS THE PROVIDER, I PERSONALLY WORE PPE DURING ENTIRE FACE TO FACE ENCOUNTER IN CLINIC WITH THE PATIENT. PATIENT ALSO WORE PPE DURING ENTIRE FACE TO FACE ENCOUNTER EXCEPT FOR A MAX OF 30 SECONDS DURING NEUROLOGICAL EVALUATION OF CRANIAL NERVES AND THEN MASK WAS PLACED BACK OVER PATIENT FACE FOR REMAINDER OF VISIT. I WASHED MY HANDS BEFORE AND AFTER  VISIT.    Please note that portions of this note may have been completed with a voice recognition program. Efforts were made to edit the dictations, but occasionally words are mistranscribed.

## 2022-03-21 ENCOUNTER — HOSPITAL ENCOUNTER (OUTPATIENT)
Dept: NUCLEAR MEDICINE | Facility: HOSPITAL | Age: 72
Discharge: HOME | End: 2022-03-21

## 2022-03-21 DIAGNOSIS — R07.89 CHEST PAIN, ATYPICAL: ICD-10-CM

## 2022-03-21 PROCEDURE — 78452 HT MUSCLE IMAGE SPECT MULT: CPT | Performed by: INTERNAL MEDICINE

## 2022-03-21 PROCEDURE — 0 TECHNETIUM SESTAMIBI: Performed by: INTERNAL MEDICINE

## 2022-03-21 PROCEDURE — 93017 CV STRESS TEST TRACING ONLY: CPT

## 2022-03-21 PROCEDURE — A9500 TC99M SESTAMIBI: HCPCS | Performed by: INTERNAL MEDICINE

## 2022-03-21 PROCEDURE — 25010000002 REGADENOSON 0.4 MG/5ML SOLUTION: Performed by: INTERNAL MEDICINE

## 2022-03-21 PROCEDURE — 93018 CV STRESS TEST I&R ONLY: CPT | Performed by: INTERNAL MEDICINE

## 2022-03-21 PROCEDURE — 78452 HT MUSCLE IMAGE SPECT MULT: CPT

## 2022-03-21 RX ADMIN — REGADENOSON 0.4 MG: 0.08 INJECTION, SOLUTION INTRAVENOUS at 09:22

## 2022-03-21 RX ADMIN — TECHNETIUM TC 99M SESTAMIBI 1 DOSE: 1 INJECTION INTRAVENOUS at 09:22

## 2022-03-21 RX ADMIN — TECHNETIUM TC 99M SESTAMIBI 1 DOSE: 1 INJECTION INTRAVENOUS at 07:40

## 2022-03-25 LAB
BH CV REST NUCLEAR ISOTOPE DOSE: 10.2 MCI
BH CV STRESS COMMENTS STAGE 1: NORMAL
BH CV STRESS DOSE REGADENOSON STAGE 1: 0.4
BH CV STRESS DURATION MIN STAGE 1: 0
BH CV STRESS DURATION SEC STAGE 1: 10
BH CV STRESS NUCLEAR ISOTOPE DOSE: 35 MCI
BH CV STRESS PROTOCOL 1: NORMAL
BH CV STRESS RECOVERY BP: NORMAL MMHG
BH CV STRESS RECOVERY HR: 85 BPM
BH CV STRESS STAGE 1: 1
LV EF NUC BP: 82 %
MAXIMAL PREDICTED HEART RATE: 148 BPM
PERCENT MAX PREDICTED HR: 63.51 %
STRESS BASELINE BP: NORMAL MMHG
STRESS BASELINE HR: 61 BPM
STRESS PERCENT HR: 75 %
STRESS POST PEAK BP: NORMAL MMHG
STRESS POST PEAK HR: 94 BPM
STRESS TARGET HR: 126 BPM

## 2022-04-06 DIAGNOSIS — G50.0 TRIGEMINAL NEURALGIA: ICD-10-CM

## 2022-04-06 RX ORDER — AMITRIPTYLINE HYDROCHLORIDE 10 MG/1
TABLET, FILM COATED ORAL
Qty: 135 TABLET | Refills: 1 | Status: SHIPPED | OUTPATIENT
Start: 2022-04-06 | End: 2022-09-01

## 2022-04-07 ENCOUNTER — TELEPHONE (OUTPATIENT)
Dept: FAMILY MEDICINE CLINIC | Facility: CLINIC | Age: 72
End: 2022-04-07

## 2022-04-07 DIAGNOSIS — F32.1 MODERATE SINGLE CURRENT EPISODE OF MAJOR DEPRESSIVE DISORDER: ICD-10-CM

## 2022-04-07 RX ORDER — ESCITALOPRAM OXALATE 10 MG/1
10 TABLET ORAL DAILY
Qty: 90 TABLET | Refills: 1 | Status: SHIPPED | OUTPATIENT
Start: 2022-04-07 | End: 2022-09-08

## 2022-04-07 NOTE — TELEPHONE ENCOUNTER
----- Message from Antonio Barnes sent at 4/6/2022  5:18 PM EDT -----  Regarding: Escitalopram 10 mg  Ritu Herr!  Can you please help call in my prescription for Escitalopram 10 mg to [employer]?  The first one was through [employer] because you wanted me to have it quickly, but my prescription coverage is actually through [email or web address].  Thank you so much and let me know if you need anything else!  - Bacilio

## 2022-04-13 ENCOUNTER — TELEPHONE (OUTPATIENT)
Dept: FAMILY MEDICINE CLINIC | Facility: CLINIC | Age: 72
End: 2022-04-13

## 2022-04-13 NOTE — TELEPHONE ENCOUNTER
HUB can share.  LVM to schedule AWV on 11/20/22 or later. Please advise patient and schedule.   Thanks.

## 2022-05-26 RX ORDER — ATORVASTATIN CALCIUM 40 MG/1
TABLET, FILM COATED ORAL
Qty: 90 TABLET | Refills: 1 | Status: SHIPPED | OUTPATIENT
Start: 2022-05-26 | End: 2022-12-27

## 2022-06-14 ENCOUNTER — OFFICE VISIT (OUTPATIENT)
Dept: FAMILY MEDICINE CLINIC | Facility: CLINIC | Age: 72
End: 2022-06-14

## 2022-06-14 VITALS
DIASTOLIC BLOOD PRESSURE: 75 MMHG | WEIGHT: 107.6 LBS | HEART RATE: 65 BPM | SYSTOLIC BLOOD PRESSURE: 120 MMHG | OXYGEN SATURATION: 99 % | HEIGHT: 60 IN | TEMPERATURE: 97.5 F | BODY MASS INDEX: 21.13 KG/M2

## 2022-06-14 DIAGNOSIS — I10 ESSENTIAL HYPERTENSION: ICD-10-CM

## 2022-06-14 DIAGNOSIS — E78.2 MIXED HYPERLIPIDEMIA: ICD-10-CM

## 2022-06-14 DIAGNOSIS — D64.9 ANEMIA, UNSPECIFIED TYPE: ICD-10-CM

## 2022-06-14 DIAGNOSIS — F32.1 MODERATE SINGLE CURRENT EPISODE OF MAJOR DEPRESSIVE DISORDER: Primary | ICD-10-CM

## 2022-06-14 DIAGNOSIS — I10 PRIMARY HYPERTENSION: ICD-10-CM

## 2022-06-14 DIAGNOSIS — D56.8 OTHER THALASSEMIA: ICD-10-CM

## 2022-06-14 DIAGNOSIS — R73.03 PRE-DIABETES: ICD-10-CM

## 2022-06-14 DIAGNOSIS — G50.0 TRIGEMINAL NEURALGIA: ICD-10-CM

## 2022-06-14 LAB
EXPIRATION DATE: NORMAL
HBA1C MFR BLD: 5.9 %
Lab: NORMAL

## 2022-06-14 PROCEDURE — 3044F HG A1C LEVEL LT 7.0%: CPT | Performed by: NURSE PRACTITIONER

## 2022-06-14 PROCEDURE — 83036 HEMOGLOBIN GLYCOSYLATED A1C: CPT | Performed by: NURSE PRACTITIONER

## 2022-06-14 PROCEDURE — 99214 OFFICE O/P EST MOD 30 MIN: CPT | Performed by: NURSE PRACTITIONER

## 2022-06-14 RX ORDER — AMLODIPINE BESYLATE 10 MG/1
10 TABLET ORAL DAILY
Qty: 90 TABLET | Refills: 1 | Status: SHIPPED | OUTPATIENT
Start: 2022-06-14 | End: 2022-12-27

## 2022-06-14 NOTE — PROGRESS NOTES
Subjective     Chief Complaint:    Chief Complaint   Patient presents with   • Anxiety   • Depression       History of Present Illness:   Depression, on lexapro, doing better  Had trouble with feeling left ear fullness last week but that is better. Had trouble hearing but has resolved  HTN, on metoprolol and norvasc, tolerating   Follows with neuro for trigemial neuralgia, on tegretol, lyrica and elavil   Prediabetes, a1c 6.3 in feb, she notes increased appetite and eats whatever she wants   HLD on statin   No further episodes of chest pain       Review of Systems  Gen- No fevers, chills  CV- No chest pain, palpitations  Resp- No cough, dyspnea  GI- No N/V/D, abd pain  Neuro-No dizziness, headaches      I have reviewed and/or updated the patient's past medical, surgical, family, social history and problem list as appropriate.     Medications:    Current Outpatient Medications:   •  amitriptyline (ELAVIL) 10 MG tablet, TAKE 1 AND 1/2 TABLETS EVERY DAY AT DINNER, Disp: 135 tablet, Rfl: 1  •  amLODIPine (NORVASC) 10 MG tablet, TAKE 1 TABLET EVERY DAY AS DIRECTED, Disp: 90 tablet, Rfl: 1  •  atorvastatin (LIPITOR) 40 MG tablet, TAKE 1 TABLET EVERY NIGHT, Disp: 90 tablet, Rfl: 1  •  calcium-vitamin D (OSCAL-500) 500-200 MG-UNIT per tablet, Take 1 tablet by mouth 2 (two) times a day., Disp: , Rfl:   •  carBAMazepine XR (TEGretol  XR) 400 MG 12 hr tablet, TAKE 1 TABLET TWICE DAILY, Disp: 180 tablet, Rfl: 1  •  Cholecalciferol (VITAMIN D3) 1000 UNITS capsule, Take 1 capsule by mouth daily., Disp: , Rfl:   •  escitalopram (Lexapro) 10 MG tablet, Take 1 tablet by mouth Daily., Disp: 90 tablet, Rfl: 1  •  melatonin 5 MG tablet tablet, Take 5 mg by mouth., Disp: , Rfl:   •  metoprolol succinate XL (TOPROL-XL) 50 MG 24 hr tablet, Take 1 tablet by mouth Daily., Disp: 90 tablet, Rfl: 1  •  pregabalin (Lyrica) 50 MG capsule, Take 1 capsule by mouth 3 (Three) Times a Day., Disp: 270 capsule, Rfl: 1    Current  "Facility-Administered Medications:   •  albuterol (PROVENTIL) nebulizer solution 0.083% 2.5 mg/3mL, 2.5 mg, Nebulization, Q6H PRN, Berta Julian, APRN, 2.5 mg at 11/20/18 1010    Allergies:  Allergies   Allergen Reactions   • Ace Inhibitors Cough   • Penicillins Other (See Comments)     Childhood allergy        Objective     Vital Signs:   Vitals:    06/14/22 1433   BP: 120/75   Pulse: 65   Temp: 97.5 °F (36.4 °C)   SpO2: 99%   Weight: 48.8 kg (107 lb 9.6 oz)   Height: 152.4 cm (60\")   PainSc: 0-No pain     Body mass index is 21.01 kg/m².    Physical Exam:    Physical Exam  Vitals and nursing note reviewed.   Constitutional:       Appearance: She is well-developed.   HENT:      Head: Normocephalic and atraumatic.      Right Ear: Tympanic membrane normal.      Left Ear: Tympanic membrane normal.   Eyes:      Pupils: Pupils are equal, round, and reactive to light.   Cardiovascular:      Rate and Rhythm: Normal rate and regular rhythm.      Heart sounds: Normal heart sounds.   Pulmonary:      Effort: Pulmonary effort is normal.      Breath sounds: Normal breath sounds.   Abdominal:      General: Bowel sounds are normal. There is no distension.      Palpations: Abdomen is soft.      Tenderness: There is no abdominal tenderness.   Musculoskeletal:      Cervical back: Neck supple.   Skin:     General: Skin is warm and dry.   Neurological:      Mental Status: She is alert and oriented to person, place, and time.   Psychiatric:         Behavior: Behavior normal.         Assessment / Plan     Assessment/Plan:   Problem List Items Addressed This Visit        Cardiac and Vasculature    Mixed hyperlipidemia    Relevant Medications    atorvastatin (LIPITOR) 40 MG tablet    Hypertension    Relevant Medications    amLODIPine (NORVASC) 10 MG tablet    metoprolol succinate XL (TOPROL-XL) 50 MG 24 hr tablet       Endocrine and Metabolic    Pre-diabetes       Hematology and Neoplasia    Anemia    Overview     Impression: 02/15/2016 " - Microcytic anemia, hemoglobin 10.5, hematocrit 33, MCV 67.2.  Patient would benefit from iron supplementation however due to constipation, perhaps dietary intake of iron rich foods is a better option for her.;            Thalassemia       Mental Health    Moderate single current episode of major depressive disorder (HCC) - Primary    Relevant Medications    amitriptyline (ELAVIL) 10 MG tablet    escitalopram (Lexapro) 10 MG tablet       Neuro    Trigeminal neuralgia    Relevant Medications    pregabalin (Lyrica) 50 MG capsule    carBAMazepine XR (TEGretol  XR) 400 MG 12 hr tablet    amitriptyline (ELAVIL) 10 MG tablet        -- mood controlled on current regimen, continue  -- BP controlled  -- continue f/u with neuro, note reviewed  -- repeat A1c    Follow up:  Return in about 6 months (around 12/14/2022) for Medicare Wellness.    Electronically signed by PETERSON Nguyen   03/14/2022 14:51 EDT      Please note that portions of this note may have been completed with a voice recognition program. Efforts were made to edit the dictations, but occasionally words are mistranscribed.

## 2022-07-05 ENCOUNTER — TRANSCRIBE ORDERS (OUTPATIENT)
Dept: ADMINISTRATIVE | Facility: HOSPITAL | Age: 72
End: 2022-07-05

## 2022-07-05 DIAGNOSIS — Z12.31 VISIT FOR SCREENING MAMMOGRAM: Primary | ICD-10-CM

## 2022-07-11 DIAGNOSIS — G50.0 TRIGEMINAL NEURALGIA: ICD-10-CM

## 2022-07-12 RX ORDER — CARBAMAZEPINE 400 MG/1
TABLET, EXTENDED RELEASE ORAL
Qty: 180 TABLET | Refills: 1 | Status: SHIPPED | OUTPATIENT
Start: 2022-07-12 | End: 2023-03-02 | Stop reason: SDUPTHER

## 2022-07-15 DIAGNOSIS — G50.0 TRIGEMINAL NEURALGIA: ICD-10-CM

## 2022-07-15 RX ORDER — PREGABALIN 50 MG/1
CAPSULE ORAL
Qty: 270 CAPSULE | Refills: 1 | Status: SHIPPED | OUTPATIENT
Start: 2022-07-15 | End: 2022-12-02 | Stop reason: SDUPTHER

## 2022-08-05 ENCOUNTER — HOSPITAL ENCOUNTER (OUTPATIENT)
Dept: MAMMOGRAPHY | Facility: HOSPITAL | Age: 72
Discharge: HOME | End: 2022-08-05
Admitting: NURSE PRACTITIONER

## 2022-08-05 DIAGNOSIS — Z12.31 VISIT FOR SCREENING MAMMOGRAM: ICD-10-CM

## 2022-08-05 PROCEDURE — 77067 SCR MAMMO BI INCL CAD: CPT | Performed by: RADIOLOGY

## 2022-08-05 PROCEDURE — 77063 BREAST TOMOSYNTHESIS BI: CPT | Performed by: RADIOLOGY

## 2022-08-05 PROCEDURE — 77067 SCR MAMMO BI INCL CAD: CPT

## 2022-08-05 PROCEDURE — 77063 BREAST TOMOSYNTHESIS BI: CPT

## 2022-08-08 RX ORDER — METOPROLOL SUCCINATE 50 MG/1
TABLET, EXTENDED RELEASE ORAL
Qty: 90 TABLET | Refills: 1 | Status: SHIPPED | OUTPATIENT
Start: 2022-08-08 | End: 2023-03-09

## 2022-09-01 DIAGNOSIS — G50.0 TRIGEMINAL NEURALGIA: ICD-10-CM

## 2022-09-01 RX ORDER — AMITRIPTYLINE HYDROCHLORIDE 10 MG/1
TABLET, FILM COATED ORAL
Qty: 135 TABLET | Refills: 1 | Status: SHIPPED | OUTPATIENT
Start: 2022-09-01 | End: 2022-12-02 | Stop reason: ALTCHOICE

## 2022-09-08 DIAGNOSIS — F32.1 MODERATE SINGLE CURRENT EPISODE OF MAJOR DEPRESSIVE DISORDER: ICD-10-CM

## 2022-09-08 RX ORDER — ESCITALOPRAM OXALATE 10 MG/1
TABLET ORAL
Qty: 90 TABLET | Refills: 1 | Status: SHIPPED | OUTPATIENT
Start: 2022-09-08 | End: 2022-12-14

## 2022-12-02 ENCOUNTER — OFFICE VISIT (OUTPATIENT)
Dept: NEUROLOGY | Facility: CLINIC | Age: 72
End: 2022-12-02

## 2022-12-02 VITALS
TEMPERATURE: 97 F | HEIGHT: 60 IN | DIASTOLIC BLOOD PRESSURE: 80 MMHG | RESPIRATION RATE: 14 BRPM | BODY MASS INDEX: 21.6 KG/M2 | SYSTOLIC BLOOD PRESSURE: 134 MMHG | OXYGEN SATURATION: 99 % | WEIGHT: 110 LBS | HEART RATE: 75 BPM

## 2022-12-02 DIAGNOSIS — G50.0 TRIGEMINAL NEURALGIA: Primary | ICD-10-CM

## 2022-12-02 DIAGNOSIS — G50.0 OROFACIAL NEUROPATHIC PAIN: ICD-10-CM

## 2022-12-02 PROCEDURE — 99213 OFFICE O/P EST LOW 20 MIN: CPT | Performed by: NURSE PRACTITIONER

## 2022-12-02 RX ORDER — NABUMETONE 500 MG/1
500 TABLET, FILM COATED ORAL 2 TIMES DAILY PRN
Qty: 60 TABLET | Refills: 2 | Status: SHIPPED | OUTPATIENT
Start: 2022-12-02

## 2022-12-02 RX ORDER — PREGABALIN 50 MG/1
50 CAPSULE ORAL 3 TIMES DAILY
Qty: 270 CAPSULE | Refills: 1 | Status: SHIPPED | OUTPATIENT
Start: 2023-01-02

## 2022-12-02 RX ORDER — DULOXETIN HYDROCHLORIDE 20 MG/1
20 CAPSULE, DELAYED RELEASE ORAL EVERY MORNING
Qty: 90 CAPSULE | Refills: 1 | Status: SHIPPED | OUTPATIENT
Start: 2022-12-02 | End: 2022-12-14

## 2022-12-02 NOTE — PROGRESS NOTES
Follow Up Neurology Office Visit      Patient Name: Antonio Barnes    Referring Physician: No ref. provider found    Chief Complaint:    Chief Complaint   Patient presents with   • Trigeminal Neuralgia     followup       History of Present Illness: Antonio Barnes is a very pleasant 72 y.o. female who is here to follow up with Neurology for facial pain.  She is accompanied by her daughter today.  She reports that her symptoms been stable, although she continues to have intermittent bouts of severe pain.  She continues to report somnolence as side effect of medications.    The following portions of the patient's history were reviewed and updated as appropriate: allergies, current medications, past family history, past medical history, past social history, past surgical history and problem list.    Subjective     Review of Systems:   Review of Systems   HENT: Negative for dental problem.    Neurological: Negative for facial asymmetry.   All other systems reviewed and are negative.    Medications:     Current Outpatient Medications:   •  amLODIPine (NORVASC) 10 MG tablet, Take 1 tablet by mouth Daily. As directed, Disp: 90 tablet, Rfl: 1  •  atorvastatin (LIPITOR) 40 MG tablet, TAKE 1 TABLET EVERY NIGHT, Disp: 90 tablet, Rfl: 1  •  calcium-vitamin D (OSCAL-500) 500-200 MG-UNIT per tablet, Take 1 tablet by mouth 2 (two) times a day., Disp: , Rfl:   •  carBAMazepine XR (TEGretol  XR) 400 MG 12 hr tablet, TAKE 1 TABLET TWICE DAILY, Disp: 180 tablet, Rfl: 1  •  Cholecalciferol (VITAMIN D3) 1000 UNITS capsule, Take 1 capsule by mouth daily., Disp: , Rfl:   •  escitalopram (LEXAPRO) 10 MG tablet, TAKE 1 TABLET EVERY DAY, Disp: 90 tablet, Rfl: 1  •  melatonin 5 MG tablet tablet, Take 5 mg by mouth., Disp: , Rfl:   •  metoprolol succinate XL (TOPROL-XL) 50 MG 24 hr tablet, TAKE 1 TABLET EVERY DAY, Disp: 90 tablet, Rfl: 1  •  [START ON 1/2/2023] pregabalin (LYRICA) 50 MG capsule, Take 1 capsule by mouth 3 (Three) Times a  "Day., Disp: 270 capsule, Rfl: 1  •  DULoxetine (Cymbalta) 20 MG capsule, Take 1 capsule by mouth Every Morning., Disp: 90 capsule, Rfl: 1  •  nabumetone (RELAFEN) 500 MG tablet, Take 1 tablet by mouth 2 (Two) Times a Day As Needed for Moderate Pain (intermittent facial pain)., Disp: 60 tablet, Rfl: 2    Current Facility-Administered Medications:   •  albuterol (PROVENTIL) nebulizer solution 0.083% 2.5 mg/3mL, 2.5 mg, Nebulization, Q6H PRN, Eliel, Berta N, APRN, 2.5 mg at 11/20/18 1010    Allergies:   Allergies   Allergen Reactions   • Ace Inhibitors Cough   • Penicillins Other (See Comments)     Childhood allergy        Objective     Physical Exam:  Vital Signs:   Vitals:    12/02/22 1421   BP: 134/80   Pulse: 75   Resp: 14   Temp: 97 °F (36.1 °C)   SpO2: 99%   Weight: 49.9 kg (110 lb)   Height: 152.4 cm (60\")     Physical Exam  Vitals and nursing note reviewed. Exam conducted with a chaperone present (daughter).   Constitutional:       Appearance: Normal appearance.   Pulmonary:      Effort: Pulmonary effort is normal.   Neurological:      Mental Status: She is oriented to person, place, and time. Mental status is at baseline.      Cranial Nerves: Cranial nerves 2-12 are intact.      Motor: Motor strength is normal.      Gait: Gait is intact.   Psychiatric:         Mood and Affect: Mood normal.         Speech: Speech normal.         Behavior: Behavior normal.       Neurologic Exam     Mental Status   Oriented to person, place, and time.   Attention: normal. Concentration: normal.   Speech: speech is normal   Level of consciousness: alert  Normal comprehension.     Cranial Nerves   Cranial nerves II through XII intact.     Motor Exam   Muscle bulk: normal  Overall muscle tone: normal    Strength   Strength 5/5 throughout.     Gait, Coordination, and Reflexes     Gait  Gait: normal    Tremor   Resting tremor: absent    Results Review:   I have reviewed the patient's other medical records to include, labs, radiology " and referrals.     Assessment / Plan      Assessment/Plan:   Diagnoses and all orders for this visit:    1. Trigeminal neuralgia (Primary)  -     DULoxetine (Cymbalta) 20 MG capsule; Take 1 capsule by mouth Every Morning.  Dispense: 90 capsule; Refill: 1  -     pregabalin (LYRICA) 50 MG capsule; Take 1 capsule by mouth 3 (Three) Times a Day.  Dispense: 270 capsule; Refill: 1  -     nabumetone (RELAFEN) 500 MG tablet; Take 1 tablet by mouth 2 (Two) Times a Day As Needed for Moderate Pain (intermittent facial pain).  Dispense: 60 tablet; Refill: 2    2. Orofacial neuropathic pain  -     DULoxetine (Cymbalta) 20 MG capsule; Take 1 capsule by mouth Every Morning.  Dispense: 90 capsule; Refill: 1    Patient has had persistent facial pain for many years, mostly controlled with current regimen although she still has occasional flares.  She does note that if she takes ibuprofen, it can somewhat decrease pain during flare, I discussed using Relafen as needed and she is agreeable to this today.  She would like to minimize sedating medication.  I have also discussed with her that Cymbalta can be helpful adjunct for neuropathic pain.  She is currently taking Lexapro.  We discussed potential for serotonin syndrome, will mitigate with low-dose duloxetine.  I have encouraged her to contact me if she experiences any new or worsening symptoms.    Follow Up:   Return in about 6 months (around 6/2/2023) for Next scheduled follow up.    PETERSON Grullon  [facility]   AS THE PROVIDER, I PERSONALLY WORE PPE DURING ENTIRE FACE TO FACE ENCOUNTER IN CLINIC WITH THE PATIENT. PATIENT ALSO WORE PPE DURING ENTIRE FACE TO FACE ENCOUNTER EXCEPT FOR A MAX OF 30 SECONDS DURING NEUROLOGICAL EVALUATION OF CRANIAL NERVES AND THEN MASK WAS PLACED BACK OVER PATIENT FACE FOR REMAINDER OF VISIT. I WASHED MY HANDS BEFORE AND AFTER VISIT.    Please note that portions of this note may have been completed with a voice recognition  program. Efforts were made to edit the dictations, but occasionally words are mistranscribed.

## 2022-12-02 NOTE — PATIENT INSTRUCTIONS
Decrease the amitriptyline to 1 pill at night for 3 weeks  Then decrease to half pill at night for 2 weeks  Then do 1/2 pill every other night for one week  Then stop    In 3 weeks from now, you can start the duloxetine (Cymbalta). I will start this at a really low dose, so less concern for any interaction with other medicines. This will come by mail delivery.     You have been prescribed NSAIDs for your pain (Relafen/nabumetone). Do not take these prescription-strength formulas with other medications from the pharmacy such as ibuprofen, Motrin, Advil, or Aleve. Please take this medication with food.

## 2022-12-14 ENCOUNTER — OFFICE VISIT (OUTPATIENT)
Dept: FAMILY MEDICINE CLINIC | Facility: CLINIC | Age: 72
End: 2022-12-14

## 2022-12-14 VITALS
SYSTOLIC BLOOD PRESSURE: 150 MMHG | OXYGEN SATURATION: 95 % | HEART RATE: 72 BPM | HEIGHT: 60 IN | DIASTOLIC BLOOD PRESSURE: 90 MMHG | WEIGHT: 109.8 LBS | TEMPERATURE: 97.2 F | BODY MASS INDEX: 21.55 KG/M2

## 2022-12-14 DIAGNOSIS — F32.1 MODERATE SINGLE CURRENT EPISODE OF MAJOR DEPRESSIVE DISORDER: Primary | ICD-10-CM

## 2022-12-14 DIAGNOSIS — E78.2 MIXED HYPERLIPIDEMIA: ICD-10-CM

## 2022-12-14 DIAGNOSIS — G50.0 OROFACIAL NEUROPATHIC PAIN: ICD-10-CM

## 2022-12-14 DIAGNOSIS — R73.03 PRE-DIABETES: ICD-10-CM

## 2022-12-14 DIAGNOSIS — I10 PRIMARY HYPERTENSION: ICD-10-CM

## 2022-12-14 DIAGNOSIS — G50.0 TRIGEMINAL NEURALGIA: ICD-10-CM

## 2022-12-14 DIAGNOSIS — M81.0 OSTEOPOROSIS, UNSPECIFIED OSTEOPOROSIS TYPE, UNSPECIFIED PATHOLOGICAL FRACTURE PRESENCE: ICD-10-CM

## 2022-12-14 DIAGNOSIS — Z23 NEED FOR INFLUENZA VACCINATION: ICD-10-CM

## 2022-12-14 DIAGNOSIS — D56.8 OTHER THALASSEMIA: ICD-10-CM

## 2022-12-14 PROCEDURE — G0008 ADMIN INFLUENZA VIRUS VAC: HCPCS | Performed by: NURSE PRACTITIONER

## 2022-12-14 PROCEDURE — 90662 IIV NO PRSV INCREASED AG IM: CPT | Performed by: NURSE PRACTITIONER

## 2022-12-14 PROCEDURE — 99214 OFFICE O/P EST MOD 30 MIN: CPT | Performed by: NURSE PRACTITIONER

## 2022-12-14 RX ORDER — DULOXETIN HYDROCHLORIDE 30 MG/1
30 CAPSULE, DELAYED RELEASE ORAL EVERY MORNING
Qty: 90 CAPSULE | Refills: 1 | Status: SHIPPED | OUTPATIENT
Start: 2022-12-14 | End: 2023-01-10

## 2022-12-14 NOTE — PROGRESS NOTES
Subjective     Chief Complaint:    Chief Complaint   Patient presents with   • Anxiety   • Depression       History of Present Illness:   Depression, on lexapro, doing ok  HTN, on metoprolol and norvasc, tolerating   Follows with neuro for trigemial neuralgia, on tegretol, lyrica and elavil, neuro most recenlty has put her on cymbalta instead of elavil, she has not started yet   Prediabetes  HLD on statin   Has some dizziness but has resolved   She is sleeping well     Review of Systems  Gen- No fevers, chills  CV- No chest pain, palpitations  Resp- No cough, dyspnea  GI- No N/V/D, abd pain  Neuro- headaches      I have reviewed and/or updated the patient's past medical, surgical, family, social history and problem list as appropriate.     Medications:    Current Outpatient Medications:   •  amLODIPine (NORVASC) 10 MG tablet, Take 1 tablet by mouth Daily. As directed, Disp: 90 tablet, Rfl: 1  •  atorvastatin (LIPITOR) 40 MG tablet, TAKE 1 TABLET EVERY NIGHT, Disp: 90 tablet, Rfl: 1  •  calcium-vitamin D (OSCAL-500) 500-200 MG-UNIT per tablet, Take 1 tablet by mouth 2 (two) times a day., Disp: , Rfl:   •  carBAMazepine XR (TEGretol  XR) 400 MG 12 hr tablet, TAKE 1 TABLET TWICE DAILY, Disp: 180 tablet, Rfl: 1  •  Cholecalciferol (VITAMIN D3) 1000 UNITS capsule, Take 1 capsule by mouth daily., Disp: , Rfl:   •  DULoxetine (Cymbalta) 30 MG capsule, Take 1 capsule by mouth Every Morning., Disp: 90 capsule, Rfl: 1  •  melatonin 5 MG tablet tablet, Take 5 mg by mouth., Disp: , Rfl:   •  metoprolol succinate XL (TOPROL-XL) 50 MG 24 hr tablet, TAKE 1 TABLET EVERY DAY, Disp: 90 tablet, Rfl: 1  •  nabumetone (RELAFEN) 500 MG tablet, Take 1 tablet by mouth 2 (Two) Times a Day As Needed for Moderate Pain (intermittent facial pain)., Disp: 60 tablet, Rfl: 2  •  [START ON 1/2/2023] pregabalin (LYRICA) 50 MG capsule, Take 1 capsule by mouth 3 (Three) Times a Day., Disp: 270 capsule, Rfl: 1    Current Facility-Administered  "Medications:   •  albuterol (PROVENTIL) nebulizer solution 0.083% 2.5 mg/3mL, 2.5 mg, Nebulization, Q6H PRN, [place], Berta N, APRN, 2.5 mg at 11/20/18 1010    Allergies:  Allergies   Allergen Reactions   • Ace Inhibitors Cough   • Penicillins Other (See Comments)     Childhood allergy        Objective     Vital Signs:   Vitals:    12/14/22 1459   BP: 150/90   Pulse: 72   Temp: 97.2 °F (36.2 °C)   SpO2: 95%   Weight: 49.8 kg (109 lb 12.8 oz)   Height: 152.4 cm (60\")   PainSc: 0-No pain     Body mass index is 21.44 kg/m².    Physical Exam:    Physical Exam  Vitals and nursing note reviewed.   Constitutional:       Appearance: She is well-developed.   HENT:      Head: Normocephalic and atraumatic.      Right Ear: Tympanic membrane normal.      Left Ear: Tympanic membrane normal.   Eyes:      Pupils: Pupils are equal, round, and reactive to light.   Cardiovascular:      Rate and Rhythm: Normal rate and regular rhythm.      Heart sounds: Normal heart sounds.   Pulmonary:      Effort: Pulmonary effort is normal.      Breath sounds: Normal breath sounds.   Abdominal:      General: Bowel sounds are normal. There is no distension.      Palpations: Abdomen is soft.      Tenderness: There is no abdominal tenderness.   Musculoskeletal:      Cervical back: Neck supple.   Skin:     General: Skin is warm and dry.   Neurological:      Mental Status: She is alert and oriented to person, place, and time.   Psychiatric:         Behavior: Behavior normal.         Assessment / Plan     Assessment/Plan:   Problem List Items Addressed This Visit        Cardiac and Vasculature    Mixed hyperlipidemia    Relevant Medications    atorvastatin (LIPITOR) 40 MG tablet    Other Relevant Orders    Lipid Panel    Hypertension    Relevant Medications    amLODIPine (NORVASC) 10 MG tablet    metoprolol succinate XL (TOPROL-XL) 50 MG 24 hr tablet    Other Relevant Orders    Comprehensive Metabolic Panel    CBC Auto Differential       Endocrine and " Metabolic    Pre-diabetes    Relevant Orders    Hemoglobin A1c       Hematology and Neoplasia    Thalassemia       Mental Health    Moderate single current episode of major depressive disorder (HCC) - Primary    Relevant Medications    DULoxetine (Cymbalta) 30 MG capsule       Musculoskeletal and Injuries    Osteoporosis       Neuro    Trigeminal neuralgia    Relevant Medications    carBAMazepine XR (TEGretol  XR) 400 MG 12 hr tablet    pregabalin (LYRICA) 50 MG capsule (Start on 1/2/2023)    nabumetone (RELAFEN) 500 MG tablet    DULoxetine (Cymbalta) 30 MG capsule   Other Visit Diagnoses     Orofacial neuropathic pain        Relevant Medications    DULoxetine (Cymbalta) 30 MG capsule    Need for influenza vaccination            -- stop lexapro and do cymbalta to 30mg  -- BP controlled  -- continue f/u with neuro, note reviewed  -- RTC for labs    Follow up:  Return in about 6 months (around 6/14/2023) for Medicare Wellness.    Electronically signed by PETERSON Nguyen   03/14/2022 14:51 EDT      Please note that portions of this note may have been completed with a voice recognition program. Efforts were made to edit the dictations, but occasionally words are mistranscribed.

## 2022-12-26 DIAGNOSIS — I10 ESSENTIAL HYPERTENSION: ICD-10-CM

## 2022-12-27 RX ORDER — ATORVASTATIN CALCIUM 40 MG/1
TABLET, FILM COATED ORAL
Qty: 90 TABLET | Refills: 1 | Status: SHIPPED | OUTPATIENT
Start: 2022-12-27

## 2022-12-27 RX ORDER — AMLODIPINE BESYLATE 10 MG/1
TABLET ORAL
Qty: 90 TABLET | Refills: 1 | Status: SHIPPED | OUTPATIENT
Start: 2022-12-27

## 2023-01-10 RX ORDER — AMITRIPTYLINE HYDROCHLORIDE 10 MG/1
15 TABLET, FILM COATED ORAL EVERY EVENING
Qty: 45 TABLET | Refills: 0 | Status: SHIPPED | OUTPATIENT
Start: 2023-01-10 | End: 2023-01-10 | Stop reason: SDUPTHER

## 2023-01-10 RX ORDER — AMITRIPTYLINE HYDROCHLORIDE 10 MG/1
15 TABLET, FILM COATED ORAL EVERY EVENING
Qty: 135 TABLET | Refills: 1 | Status: SHIPPED | OUTPATIENT
Start: 2023-02-10 | End: 2023-05-11

## 2023-01-18 DIAGNOSIS — F32.1 MODERATE SINGLE CURRENT EPISODE OF MAJOR DEPRESSIVE DISORDER: ICD-10-CM

## 2023-01-18 LAB
ALBUMIN SERPL-MCNC: 4.4 G/DL (ref 3.5–5.2)
ALBUMIN/GLOB SERPL: 1.8 G/DL
ALP SERPL-CCNC: 103 U/L (ref 39–117)
ALT SERPL-CCNC: 24 U/L (ref 1–33)
AST SERPL-CCNC: 28 U/L (ref 1–32)
BILIRUB SERPL-MCNC: 0.3 MG/DL (ref 0–1.2)
BUN SERPL-MCNC: 7 MG/DL (ref 8–23)
BUN/CREAT SERPL: 10 (ref 7–25)
CALCIUM SERPL-MCNC: 9 MG/DL (ref 8.6–10.5)
CHLORIDE SERPL-SCNC: 99 MMOL/L (ref 98–107)
CHOLEST SERPL-MCNC: 236 MG/DL (ref 0–200)
CO2 SERPL-SCNC: 30 MMOL/L (ref 22–29)
CREAT SERPL-MCNC: 0.7 MG/DL (ref 0.57–1)
DIFFERENTIAL COMMENT: ABNORMAL
EGFRCR SERPLBLD CKD-EPI 2021: 91.5 ML/MIN/1.73
EOSINOPHIL # BLD MANUAL: 0.26 10*3/MM3 (ref 0–0.4)
EOSINOPHIL NFR BLD MANUAL: 4.2 % (ref 0.3–6.2)
ERYTHROCYTE [DISTWIDTH] IN BLOOD BY AUTOMATED COUNT: 15.4 % (ref 12.3–15.4)
GLOBULIN SER CALC-MCNC: 2.4 GM/DL
GLUCOSE SERPL-MCNC: 105 MG/DL (ref 65–99)
HBA1C MFR BLD: 6.3 % (ref 4.8–5.6)
HCT VFR BLD AUTO: 36.4 % (ref 34–46.6)
HDLC SERPL-MCNC: 59 MG/DL (ref 40–60)
HGB BLD-MCNC: 11.5 G/DL (ref 12–15.9)
LDLC SERPL CALC-MCNC: 141 MG/DL (ref 0–100)
LYMPHOCYTES # BLD MANUAL: 2.34 10*3/MM3 (ref 0.7–3.1)
LYMPHOCYTES NFR BLD MANUAL: 37.5 % (ref 19.6–45.3)
MCH RBC QN AUTO: 22.2 PG (ref 26.6–33)
MCHC RBC AUTO-ENTMCNC: 31.6 G/DL (ref 31.5–35.7)
MCV RBC AUTO: 70.4 FL (ref 79–97)
MONOCYTES # BLD MANUAL: 0.26 10*3/MM3 (ref 0.1–0.9)
MONOCYTES NFR BLD MANUAL: 4.2 % (ref 5–12)
NEUTROPHILS # BLD MANUAL: 3.38 10*3/MM3 (ref 1.7–7)
NEUTROPHILS NFR BLD MANUAL: 54.2 % (ref 42.7–76)
NRBC BLD AUTO-RTO: 0 /100 WBC (ref 0–0.2)
PLATELET # BLD AUTO: 301 10*3/MM3 (ref 140–450)
PLATELET BLD QL SMEAR: ABNORMAL
POTASSIUM SERPL-SCNC: 4.1 MMOL/L (ref 3.5–5.2)
PROT SERPL-MCNC: 6.8 G/DL (ref 6–8.5)
RBC # BLD AUTO: 5.17 10*6/MM3 (ref 3.77–5.28)
RBC MORPH BLD: ABNORMAL
SODIUM SERPL-SCNC: 137 MMOL/L (ref 136–145)
TRIGL SERPL-MCNC: 201 MG/DL (ref 0–150)
VLDLC SERPL CALC-MCNC: 36 MG/DL (ref 5–40)
WBC # BLD AUTO: 6.24 10*3/MM3 (ref 3.4–10.8)

## 2023-01-18 RX ORDER — ESCITALOPRAM OXALATE 10 MG/1
10 TABLET ORAL DAILY
Qty: 90 TABLET | Refills: 3 | Status: SHIPPED | OUTPATIENT
Start: 2023-01-18

## 2023-03-02 ENCOUNTER — TELEPHONE (OUTPATIENT)
Dept: NEUROLOGY | Facility: CLINIC | Age: 73
End: 2023-03-02
Payer: MEDICARE

## 2023-03-02 DIAGNOSIS — G50.0 TRIGEMINAL NEURALGIA: ICD-10-CM

## 2023-03-02 NOTE — TELEPHONE ENCOUNTER
Caller: Antonio Barnes    Relationship: Self    Best call back number: [phone]    Requested Prescriptions:   Requested Prescriptions     Pending Prescriptions Disp Refills   • carBAMazepine XR (TEGretol  XR) 400 MG 12 hr tablet 180 tablet 1     Sig: Take 1 tablet by mouth 2 (Two) Times a Day.        Pharmacy where request should be sent: [facility] - [facility] [address] - [phone]  - [phone]     Additional details provided by patient: PT CALLING TO LET PROVIDER KNOW THAT [employer] STATES THEY HAVE FAXED A REFILL REQUEST 3 TIMES.  HAS CLINIC RECEIVED THE REQUEST?    PT HAS 20 DAYS LEFT     Does the patient have less than a 3 day supply:  [] Yes  [x] No      Joel Dixon Rep   03/02/23 09:54 EST

## 2023-03-06 RX ORDER — CARBAMAZEPINE 400 MG/1
400 TABLET, EXTENDED RELEASE ORAL 2 TIMES DAILY
Qty: 180 TABLET | Refills: 1 | Status: SHIPPED | OUTPATIENT
Start: 2023-03-06

## 2023-03-06 NOTE — TELEPHONE ENCOUNTER
I have seen no requests, please let the patient know. Also, she was suppose to see Isha 6 months after her previous visit in March 2022 and she missed that appointment, so that is probably why she is going to run out of medications.

## 2023-03-06 NOTE — TELEPHONE ENCOUNTER
Provider: AMINATA MAURICIO  Caller: PATIENT  Relationship to Patient: SELF  [facility]armacy: [facility]  Phone Number: [phone]  Reason for Call: CALLED TO CHECK ON STATUS OF REFILL REQUEST. HAS ABOUT 20 PILLS LEFT.

## 2023-03-09 RX ORDER — METOPROLOL SUCCINATE 50 MG/1
TABLET, EXTENDED RELEASE ORAL
Qty: 90 TABLET | Refills: 1 | Status: SHIPPED | OUTPATIENT
Start: 2023-03-09

## 2023-04-19 RX ORDER — AMITRIPTYLINE HYDROCHLORIDE 10 MG/1
15 TABLET, FILM COATED ORAL EVERY EVENING
Qty: 135 TABLET | Refills: 1 | Status: SHIPPED | OUTPATIENT
Start: 2023-04-19 | End: 2023-04-24 | Stop reason: SDUPTHER

## 2023-04-19 NOTE — TELEPHONE ENCOUNTER
"    Caller: Antonio Barnes \"Bacilio\"    Relationship: Self    Best call back number: [phone]    Requested Prescriptions:   Requested Prescriptions     Pending Prescriptions Disp Refills   • amitriptyline (ELAVIL) 10 MG tablet 135 tablet 1     Sig: Take 1.5 tablets by mouth Every Evening for 90 days.        Pharmacy where request should be sent: [facility] - [facility] [address] - [phone] [facility] [phone]      Last office visit with prescribing clinician: Visit date not found   Last telemedicine visit with prescribing clinician: 7/24/2023   Next office visit with prescribing clinician: 7/24/2023     Additional details provided by patient: PT'S HAS ABOUT 2 WEEKS LEFT OF THE RX    Does the patient have less than a 3 day supply:  [] Yes  [x] No    Would you like a call back once the refill request has been completed: [] Yes [x] No    If the office needs to give you a call back, can they leave a voicemail: [] Yes [x] No    Joel Romano Rep   04/19/23 09:31 EDT       "

## 2023-04-24 RX ORDER — AMITRIPTYLINE HYDROCHLORIDE 10 MG/1
15 TABLET, FILM COATED ORAL EVERY EVENING
Qty: 135 TABLET | Refills: 1 | Status: SHIPPED | OUTPATIENT
Start: 2023-04-24

## 2023-05-22 ENCOUNTER — OFFICE VISIT (OUTPATIENT)
Dept: NEUROLOGY | Facility: CLINIC | Age: 73
End: 2023-05-22
Payer: MEDICARE

## 2023-05-22 VITALS
WEIGHT: 121 LBS | SYSTOLIC BLOOD PRESSURE: 128 MMHG | HEART RATE: 68 BPM | BODY MASS INDEX: 23.75 KG/M2 | TEMPERATURE: 98 F | OXYGEN SATURATION: 96 % | DIASTOLIC BLOOD PRESSURE: 70 MMHG | HEIGHT: 60 IN

## 2023-05-22 DIAGNOSIS — G50.0 TRIGEMINAL NEURALGIA: ICD-10-CM

## 2023-05-22 RX ORDER — PREGABALIN 50 MG/1
50 CAPSULE ORAL 3 TIMES DAILY
Qty: 270 CAPSULE | Refills: 1 | Status: SHIPPED | OUTPATIENT
Start: 2023-05-22

## 2023-05-22 RX ORDER — AMITRIPTYLINE HYDROCHLORIDE 10 MG/1
15 TABLET, FILM COATED ORAL EVERY EVENING
Qty: 135 TABLET | Refills: 1 | Status: SHIPPED | OUTPATIENT
Start: 2023-05-22

## 2023-05-22 RX ORDER — CARBAMAZEPINE 400 MG/1
400 TABLET, EXTENDED RELEASE ORAL 2 TIMES DAILY
Qty: 180 TABLET | Refills: 1 | Status: SHIPPED | OUTPATIENT
Start: 2023-05-22

## 2023-05-22 NOTE — PROGRESS NOTES
"     Follow Up Office Visit      Patient Name: Antonio Barnes  : 1950   MRN: [id number]     Chief Complaint:    Chief Complaint   Patient presents with   • Follow-up     Trigeminal neuralgia        History of Present Illness: Antonio Barnes is a 72 y.o. female who is here today to follow up with  Facial pain secondary to trigeminal neuralgia. She has pain and numbness and says she prefers the numbness over the pain and says she had a procedure for this 2012 and says it caused some numbness initially and then some pain which has never gotten better.  The pain is right facial under the eye to the nasolabial fold. Overall she is able to manage on her current medication regimen.  She does report some afternoon drowsiness with her afternoon dose of pregabalin but it is not affecting her life and she says she is able to lay down and take a nap.\"  That is what FDC is for\".  She is here for medication refills.          Subjective      Review of Systems:   Review of Systems   Constitutional: Positive for fatigue.   Neurological: Negative for facial asymmetry.       I have reviewed and the following portions of the patient's history were updated as appropriate: past family history, past medical history, past social history, past surgical history and problem list.    Medications:     Current Outpatient Medications:   •  amitriptyline (ELAVIL) 10 MG tablet, Take 1.5 tablets by mouth Every Evening., Disp: 135 tablet, Rfl: 1  •  amLODIPine (NORVASC) 10 MG tablet, TAKE 1 TABLET EVERY DAY AS DIRECTED, Disp: 90 tablet, Rfl: 1  •  atorvastatin (LIPITOR) 40 MG tablet, TAKE 1 TABLET EVERY NIGHT, Disp: 90 tablet, Rfl: 1  •  calcium-vitamin D (OSCAL-500) 500-200 MG-UNIT per tablet, Take 1 tablet by mouth 2 (Two) Times a Day., Disp: , Rfl:   •  carBAMazepine XR (TEGretol  XR) 400 MG 12 hr tablet, Take 1 tablet by mouth 2 (Two) Times a Day., Disp: 180 tablet, Rfl: 1  •  Cholecalciferol (VITAMIN D3) 1000 UNITS capsule, Take " "1 capsule by mouth Daily., Disp: , Rfl:   •  escitalopram (Lexapro) 10 MG tablet, Take 1 tablet by mouth Daily., Disp: 90 tablet, Rfl: 3  •  melatonin 5 MG tablet tablet, Take 1 tablet by mouth., Disp: , Rfl:   •  metoprolol succinate XL (TOPROL-XL) 50 MG 24 hr tablet, TAKE 1 TABLET EVERY DAY, Disp: 90 tablet, Rfl: 1  •  pregabalin (LYRICA) 50 MG capsule, Take 1 capsule by mouth 3 (Three) Times a Day., Disp: 270 capsule, Rfl: 1  •  VITAMIN B COMPLEX-C PO, Take  by mouth., Disp: , Rfl:     Current Facility-Administered Medications:   •  albuterol (PROVENTIL) nebulizer solution 0.083% 2.5 mg/3mL, 2.5 mg, Nebulization, Q6H PRN, Berta Julian N, APRN, 2.5 mg at 11/20/18 1010    Allergies:   Allergies   Allergen Reactions   • Ace Inhibitors Cough   • Penicillins Other (See Comments)     Childhood allergy        Objective     Physical Exam:  Vital Signs:   Vitals:    05/22/23 1326   BP: 128/70   Pulse: 68   Temp: 98 °F (36.7 °C)   SpO2: 96%   Weight: 54.9 kg (121 lb)   Height: 152.4 cm (60\")   PainSc: 0-No pain     Body mass index is 23.63 kg/m².    Physical Exam  Constitutional:       Appearance: Normal appearance.   HENT:      Head: Normocephalic and atraumatic.   Eyes:      Extraocular Movements: Extraocular movements intact.      Conjunctiva/sclera: Conjunctivae normal.      Pupils: Pupils are equal, round, and reactive to light.   Pulmonary:      Effort: Pulmonary effort is normal.   Musculoskeletal:         General: Normal range of motion.   Skin:     General: Skin is warm and dry.   Neurological:      General: No focal deficit present.      Mental Status: She is alert and oriented to person, place, and time.      Cranial Nerves: Cranial nerves 2-12 are intact.      Sensory: Sensation is intact.      Motor: Motor function is intact. No tremor or abnormal muscle tone.      Coordination: Coordination normal. Finger-Nose-Finger Test normal.      Gait: Gait is intact. Tandem walk normal.      Deep Tendon Reflexes: " Reflexes are normal and symmetric. Babinski sign absent on the right side. Babinski sign absent on the left side.      Reflex Scores:       Bicep reflexes are 2+ on the right side and 2+ on the left side.       Brachioradialis reflexes are 2+ on the right side and 2+ on the left side.       Patellar reflexes are 2+ on the right side and 2+ on the left side.  Psychiatric:         Attention and Perception: Attention normal.         Mood and Affect: Mood normal.         Speech: Speech normal.         Behavior: Behavior normal. Behavior is cooperative.         Thought Content: Thought content normal.         Cognition and Memory: Cognition normal.         Judgment: Judgment normal.         Neurologic Exam     Mental Status   Oriented to person, place, and time.   Speech: speech is normal   Level of consciousness: alert    Cranial Nerves   Cranial nerves II through XII intact.     CN III, IV, VI   Pupils are equal, round, and reactive to light.    Motor Exam   Muscle bulk: normal  Overall muscle tone: normal    Strength   Right biceps: 5/5  Left biceps: 5/5  Right triceps: 5/5  Left triceps: 5/5  Right quadriceps: 4/5  Left quadriceps: 4/5  Right hamstrin/5  Left hamstrin/5  Right anterior tibial: 4/5  Left anterior tibial: 4/5  Right posterior tibial: 4/5  Left posterior tibial: 4/5    Sensory Exam   Light touch normal.     Gait, Coordination, and Reflexes     Gait  Gait: normal    Coordination   Finger to nose coordination: normal  Tandem walking coordination: normal    Tremor   Resting tremor: absent    Reflexes   Right brachioradialis: 2+  Left brachioradialis: 2+  Right biceps: 2+  Left biceps: 2+  Right patellar: 2+  Left patellar: 2+  Right : 2+  Left : 2+       Assessment / Plan      Assessment/Plan:   Diagnoses and all orders for this visit:    1. Trigeminal neuralgia  -     pregabalin (LYRICA) 50 MG capsule; Take 1 capsule by mouth 3 (Three) Times a Day.  Dispense: 270 capsule; Refill: 1  -      amitriptyline (ELAVIL) 10 MG tablet; Take 1.5 tablets by mouth Every Evening.  Dispense: 135 tablet; Refill: 1  -     carBAMazepine XR (TEGretol  XR) 400 MG 12 hr tablet; Take 1 tablet by mouth 2 (Two) Times a Day.  Dispense: 180 tablet; Refill: 1         Medications refilled without change.Indications and side effects discussed with patient she verbalizes understanding.  Patient will call in the interim if she has any questions or concerns or changes.  Current [facility] report #[id number] reviewed and is appropriate.  Patient signed CSA today.    Follow Up:   Return in about 6 months (around 11/22/2023).    PETERSON Chaudhari, FNP-[facility]

## 2023-07-31 DIAGNOSIS — I10 ESSENTIAL HYPERTENSION: ICD-10-CM

## 2023-07-31 RX ORDER — ATORVASTATIN CALCIUM 40 MG/1
TABLET, FILM COATED ORAL
Qty: 90 TABLET | Refills: 1 | Status: SHIPPED | OUTPATIENT
Start: 2023-07-31

## 2023-07-31 RX ORDER — AMLODIPINE BESYLATE 10 MG/1
TABLET ORAL
Qty: 90 TABLET | Refills: 1 | Status: SHIPPED | OUTPATIENT
Start: 2023-07-31

## 2023-09-07 ENCOUNTER — TRANSCRIBE ORDERS (OUTPATIENT)
Dept: FAMILY MEDICINE CLINIC | Facility: CLINIC | Age: 73
End: 2023-09-07
Payer: MEDICARE

## 2023-09-07 DIAGNOSIS — Z12.31 VISIT FOR SCREENING MAMMOGRAM: Primary | ICD-10-CM

## 2023-09-12 ENCOUNTER — OFFICE VISIT (OUTPATIENT)
Dept: FAMILY MEDICINE CLINIC | Facility: CLINIC | Age: 73
End: 2023-09-12
Payer: MEDICARE

## 2023-09-12 VITALS
OXYGEN SATURATION: 98 % | WEIGHT: 110.8 LBS | HEIGHT: 60 IN | TEMPERATURE: 97.7 F | BODY MASS INDEX: 21.75 KG/M2 | HEART RATE: 75 BPM | DIASTOLIC BLOOD PRESSURE: 78 MMHG | SYSTOLIC BLOOD PRESSURE: 164 MMHG

## 2023-09-12 DIAGNOSIS — I10 PRIMARY HYPERTENSION: ICD-10-CM

## 2023-09-12 DIAGNOSIS — R55 VASOVAGAL SYNCOPE: Primary | ICD-10-CM

## 2023-09-12 PROCEDURE — 3078F DIAST BP <80 MM HG: CPT | Performed by: NURSE PRACTITIONER

## 2023-09-12 PROCEDURE — 1159F MED LIST DOCD IN RCRD: CPT | Performed by: NURSE PRACTITIONER

## 2023-09-12 PROCEDURE — 99214 OFFICE O/P EST MOD 30 MIN: CPT | Performed by: NURSE PRACTITIONER

## 2023-09-12 PROCEDURE — 3077F SYST BP >= 140 MM HG: CPT | Performed by: NURSE PRACTITIONER

## 2023-09-12 PROCEDURE — 1160F RVW MEDS BY RX/DR IN RCRD: CPT | Performed by: NURSE PRACTITIONER

## 2023-09-12 PROCEDURE — 93000 ELECTROCARDIOGRAM COMPLETE: CPT | Performed by: NURSE PRACTITIONER

## 2023-09-12 RX ORDER — LOSARTAN POTASSIUM 25 MG/1
25 TABLET ORAL DAILY
Qty: 30 TABLET | Refills: 1 | Status: SHIPPED | OUTPATIENT
Start: 2023-09-12

## 2023-09-12 NOTE — PROGRESS NOTES
Subjective     Chief Complaint:    Chief Complaint   Patient presents with    Dizziness     When having bowel movement. Patient states she recently has fainted from this. Incident occurred approximately 2-3 weeks ago. Patient has been keeping track of blood pressure when feeling bad. States BP is always elevated when 'feeling bad'    Fatigue    Headache       History of Present Illness:   Here with hannah  3 weeks ago she was having a bowel movement and passed out, woke up in the floor.   Worsening headache and fatigue   Has been checking her bp at home and she is havin elevated readings, last nivht around 200's/90 or diastolic to 127   Some dizziness with fast movement, jackelin when she stands up to get out of the car   Sleeping well   Htn on norvasc and metoprolol     A few years ago had a syncopal episiode a few years ago as well, no workup at that yousuf    Mild constipation and has been using ballerine tea, notes when she had syncope she was having loose stool       Review of Systems  Gen- No fevers, chills  CV- No chest pain, palpitations  Resp- No cough, dyspnea  GI- No N/V/D, abd pain  Neuro-No dizziness, headaches      I have reviewed and/or updated the patient's past medical, surgical, family, social history and problem list as appropriate.     Medications:    Current Outpatient Medications:     amitriptyline (ELAVIL) 10 MG tablet, Take 1.5 tablets by mouth Every Evening., Disp: 135 tablet, Rfl: 1    amLODIPine (NORVASC) 10 MG tablet, TAKE 1 TABLET EVERY DAY AS DIRECTED, Disp: 90 tablet, Rfl: 1    atorvastatin (LIPITOR) 40 MG tablet, TAKE 1 TABLET EVERY NIGHT, Disp: 90 tablet, Rfl: 1    calcium-vitamin D (OSCAL-500) 500-200 MG-UNIT per tablet, Take 1 tablet by mouth 2 (Two) Times a Day., Disp: , Rfl:     carBAMazepine XR (TEGretol  XR) 400 MG 12 hr tablet, Take 1 tablet by mouth 2 (Two) Times a Day., Disp: 180 tablet, Rfl: 1    Cholecalciferol (VITAMIN D3) 1000 UNITS capsule, Take 1 capsule by mouth  "Daily., Disp: , Rfl:     escitalopram (Lexapro) 20 MG tablet, Take 1 tablet by mouth Daily., Disp: 90 tablet, Rfl: 3    melatonin 5 MG tablet tablet, Take 1 tablet by mouth., Disp: , Rfl:     metoprolol succinate XL (TOPROL-XL) 50 MG 24 hr tablet, TAKE 1 TABLET EVERY DAY, Disp: 90 tablet, Rfl: 1    pregabalin (LYRICA) 50 MG capsule, Take 1 capsule by mouth 3 (Three) Times a Day., Disp: 270 capsule, Rfl: 1    VITAMIN B COMPLEX-C PO, Take  by mouth., Disp: , Rfl:     losartan (Cozaar) 25 MG tablet, Take 1 tablet by mouth Daily., Disp: 30 tablet, Rfl: 1    Current Facility-Administered Medications:     albuterol (PROVENTIL) nebulizer solution 0.083% 2.5 mg/3mL, 2.5 mg, Nebulization, Q6H PRN, Vandiver, Berta N, APRN, 2.5 mg at 11/20/18 1010    Allergies:  Allergies   Allergen Reactions    Ace Inhibitors Cough    Penicillins Other (See Comments)     Childhood allergy        Objective     Vital Signs:   Vitals:    09/12/23 1512   BP: 164/78   BP Location: Right arm   Patient Position: Sitting   Cuff Size: Adult   Pulse: 75   Temp: 97.7 °F (36.5 °C)   TempSrc: Temporal   SpO2: 98%   Weight: 50.3 kg (110 lb 12.8 oz)   Height: 152.4 cm (60\")  Comment: patient stated   PainSc:   5   PainLoc: Head     Body mass index is 21.64 kg/m².    Physical Exam:    Physical Exam  Vitals and nursing note reviewed.   Constitutional:       Appearance: She is well-developed.   HENT:      Head: Normocephalic and atraumatic.   Eyes:      Pupils: Pupils are equal, round, and reactive to light.   Neck:      Vascular: No carotid bruit.   Cardiovascular:      Rate and Rhythm: Normal rate and regular rhythm.      Heart sounds: Normal heart sounds.   Pulmonary:      Effort: Pulmonary effort is normal.      Breath sounds: Normal breath sounds.   Abdominal:      General: Bowel sounds are normal. There is no distension.      Palpations: Abdomen is soft.      Tenderness: There is no abdominal tenderness.   Musculoskeletal:      Cervical back: Neck supple. "   Skin:     General: Skin is warm and dry.   Neurological:      General: No focal deficit present.      Mental Status: She is alert and oriented to person, place, and time.   Psychiatric:         Mood and Affect: Mood normal.         Behavior: Behavior normal.     ECG 12 Lead    Date/Time: 9/12/2023 5:57 PM  Performed by: Ritu Chavez APRN  Authorized by: Ritu Chavez APRN   Comparison: compared with previous ECG   Rhythm: sinus rhythm  Conduction: conduction normal  ST Segments: ST segments normal  T Waves: T waves normal    Clinical impression: normal ECG          Assessment / Plan     Assessment/Plan:   Problem List Items Addressed This Visit          Other    Hypertension    Relevant Medications    metoprolol succinate XL (TOPROL-XL) 50 MG 24 hr tablet    amLODIPine (NORVASC) 10 MG tablet    losartan (Cozaar) 25 MG tablet     Other Visit Diagnoses       Vasovagal syncope    -  Primary    Relevant Orders    Adult Transthoracic Echo Complete W/ Cont if Necessary Per Protocol          -- EKG reassuring, she is not orthostatic, she is in fact hypertensive.  Add losartan, continue amlodipine and metoprolol, check echo, work on hydration    Discussed plan of care in detail with pt today; pt verb understanding and agrees.    Follow up:  2 weeks    Electronically signed by PETERSON Nguyen   09/12/2023 15:21 EDT      Please note that portions of this note were completed with a voice recognition program.

## 2023-09-15 ENCOUNTER — TELEPHONE (OUTPATIENT)
Dept: FAMILY MEDICINE CLINIC | Facility: CLINIC | Age: 73
End: 2023-09-15

## 2023-09-26 ENCOUNTER — OFFICE VISIT (OUTPATIENT)
Dept: FAMILY MEDICINE CLINIC | Facility: CLINIC | Age: 73
End: 2023-09-26
Payer: MEDICARE

## 2023-09-26 VITALS
SYSTOLIC BLOOD PRESSURE: 170 MMHG | WEIGHT: 111 LBS | DIASTOLIC BLOOD PRESSURE: 85 MMHG | HEIGHT: 60 IN | OXYGEN SATURATION: 97 % | HEART RATE: 77 BPM | BODY MASS INDEX: 21.79 KG/M2

## 2023-09-26 DIAGNOSIS — I10 PRIMARY HYPERTENSION: ICD-10-CM

## 2023-09-26 PROCEDURE — 3079F DIAST BP 80-89 MM HG: CPT | Performed by: NURSE PRACTITIONER

## 2023-09-26 PROCEDURE — 1160F RVW MEDS BY RX/DR IN RCRD: CPT | Performed by: NURSE PRACTITIONER

## 2023-09-26 PROCEDURE — 1159F MED LIST DOCD IN RCRD: CPT | Performed by: NURSE PRACTITIONER

## 2023-09-26 PROCEDURE — 3077F SYST BP >= 140 MM HG: CPT | Performed by: NURSE PRACTITIONER

## 2023-09-26 PROCEDURE — 99213 OFFICE O/P EST LOW 20 MIN: CPT | Performed by: NURSE PRACTITIONER

## 2023-09-26 RX ORDER — LOSARTAN POTASSIUM 50 MG/1
50 TABLET ORAL DAILY
Qty: 90 TABLET | Refills: 1 | Status: SHIPPED | OUTPATIENT
Start: 2023-09-26

## 2023-09-26 NOTE — PROGRESS NOTES
Subjective     Chief Complaint:    Chief Complaint   Patient presents with    Hypertension       History of Present Illness:   Here for f/u htn and syncope, no further syncope. BP readings have been 140-185/70's at home. Toleating addition of losartan without side effects, continuies norvasc,metoprolol.   ECHO in 2 days     Review of Systems  Gen- No fevers, chills  CV- No chest pain, palpitations  Resp- No cough, dyspnea  GI- No N/V/D, abd pain  Neuro-No dizziness, headaches      I have reviewed and/or updated the patient's past medical, surgical, family, social history and problem list as appropriate.     Medications:    Current Outpatient Medications:     amitriptyline (ELAVIL) 10 MG tablet, Take 1.5 tablets by mouth Every Evening., Disp: 135 tablet, Rfl: 1    amLODIPine (NORVASC) 10 MG tablet, TAKE 1 TABLET EVERY DAY AS DIRECTED, Disp: 90 tablet, Rfl: 1    atorvastatin (LIPITOR) 40 MG tablet, TAKE 1 TABLET EVERY NIGHT, Disp: 90 tablet, Rfl: 1    calcium-vitamin D (OSCAL-500) 500-200 MG-UNIT per tablet, Take 1 tablet by mouth 2 (Two) Times a Day., Disp: , Rfl:     carBAMazepine XR (TEGretol  XR) 400 MG 12 hr tablet, Take 1 tablet by mouth 2 (Two) Times a Day., Disp: 180 tablet, Rfl: 1    Cholecalciferol (VITAMIN D3) 1000 UNITS capsule, Take 1 capsule by mouth Daily., Disp: , Rfl:     escitalopram (Lexapro) 20 MG tablet, Take 1 tablet by mouth Daily., Disp: 90 tablet, Rfl: 3    losartan (Cozaar) 50 MG tablet, Take 1 tablet by mouth Daily., Disp: 90 tablet, Rfl: 1    melatonin 5 MG tablet tablet, Take 1 tablet by mouth., Disp: , Rfl:     metoprolol succinate XL (TOPROL-XL) 50 MG 24 hr tablet, TAKE 1 TABLET EVERY DAY, Disp: 90 tablet, Rfl: 1    pregabalin (LYRICA) 50 MG capsule, Take 1 capsule by mouth 3 (Three) Times a Day., Disp: 270 capsule, Rfl: 1    VITAMIN B COMPLEX-C PO, Take  by mouth., Disp: , Rfl:     Current Facility-Administered Medications:     albuterol (PROVENTIL) nebulizer solution 0.083% 2.5  "mg/3mL, 2.5 mg, Nebulization, Q6H PRN, [place]villeBerta quinones VICENTE, APRN, 2.5 mg at 11/20/18 1010    Allergies:  Allergies   Allergen Reactions    Ace Inhibitors Cough    Penicillins Other (See Comments)     Childhood allergy        Objective     Vital Signs:   Vitals:    09/26/23 1424   BP: 170/85   Pulse: 77   SpO2: 97%   Weight: 50.3 kg (111 lb)   Height: 152.4 cm (60\")     Body mass index is 21.68 kg/m².    Physical Exam:    Physical Exam  Vitals and nursing note reviewed.   Constitutional:       Appearance: She is well-developed.   HENT:      Head: Normocephalic and atraumatic.   Eyes:      Pupils: Pupils are equal, round, and reactive to light.   Cardiovascular:      Rate and Rhythm: Normal rate and regular rhythm.      Heart sounds: Normal heart sounds.   Pulmonary:      Effort: Pulmonary effort is normal.      Breath sounds: Normal breath sounds.   Abdominal:      General: Bowel sounds are normal. There is no distension.      Palpations: Abdomen is soft.      Tenderness: There is no abdominal tenderness.   Musculoskeletal:      Cervical back: Neck supple.   Skin:     General: Skin is warm and dry.   Neurological:      General: No focal deficit present.      Mental Status: She is alert and oriented to person, place, and time.   Psychiatric:         Mood and Affect: Mood normal.         Behavior: Behavior normal.       Assessment / Plan     Assessment/Plan:   Problem List Items Addressed This Visit          Cardiac and Vasculature    Hypertension    Relevant Medications    metoprolol succinate XL (TOPROL-XL) 50 MG 24 hr tablet    amLODIPine (NORVASC) 10 MG tablet    losartan (Cozaar) 50 MG tablet    Other Relevant Orders    Comprehensive Metabolic Panel    CBC Auto Differential     -- increase losartan to 50mg and this should get her where she needs to be     Discussed plan of care in detail with pt today; pt verb understanding and agrees.    Follow up:  1 month     Electronically signed by PETERSON Nguyen "   09/26/2023 14:44 EDT      Please note that portions of this note were completed with a voice recognition program.

## 2023-09-28 ENCOUNTER — HOSPITAL ENCOUNTER (OUTPATIENT)
Dept: CARDIOLOGY | Facility: HOSPITAL | Age: 73
Discharge: HOME | End: 2023-09-28
Payer: MEDICARE

## 2023-09-28 VITALS — WEIGHT: 110.89 LBS | HEIGHT: 60 IN | BODY MASS INDEX: 21.77 KG/M2

## 2023-09-28 DIAGNOSIS — R55 VASOVAGAL SYNCOPE: ICD-10-CM

## 2023-09-28 LAB
BH CV ECHO MEAS - AO MAX PG: 5.6 MMHG
BH CV ECHO MEAS - AO MEAN PG: 3 MMHG
BH CV ECHO MEAS - AO ROOT DIAM: 1.76 CM
BH CV ECHO MEAS - AO V2 MAX: 118 CM/SEC
BH CV ECHO MEAS - AO V2 VTI: 28.3 CM
BH CV ECHO MEAS - AVA(I,D): 1.06 CM2
BH CV ECHO MEAS - EDV(CUBED): 22 ML
BH CV ECHO MEAS - EDV(MOD-SP2): 46.9 ML
BH CV ECHO MEAS - EDV(MOD-SP4): 55 ML
BH CV ECHO MEAS - EF(MOD-BP): 63.3 %
BH CV ECHO MEAS - EF(MOD-SP2): 65.9 %
BH CV ECHO MEAS - EF(MOD-SP4): 58.7 %
BH CV ECHO MEAS - EF_3D-VOL: 60 %
BH CV ECHO MEAS - ESV(CUBED): 4.7 ML
BH CV ECHO MEAS - ESV(MOD-SP2): 16 ML
BH CV ECHO MEAS - ESV(MOD-SP4): 22.7 ML
BH CV ECHO MEAS - FS: 40 %
BH CV ECHO MEAS - IVS/LVPW: 1.71 CM
BH CV ECHO MEAS - IVSD: 1.2 CM
BH CV ECHO MEAS - LA DIMENSION: 3.9 CM
BH CV ECHO MEAS - LAT PEAK E' VEL: 5.4 CM/SEC
BH CV ECHO MEAS - LV MASS(C)D: 143 GRAMS
BH CV ECHO MEAS - LV MAX PG: 2.7 MMHG
BH CV ECHO MEAS - LV MEAN PG: 2 MMHG
BH CV ECHO MEAS - LV V1 MAX: 82.5 CM/SEC
BH CV ECHO MEAS - LV V1 VTI: 22.5 CM
BH CV ECHO MEAS - LVIDD: 2.8 CM
BH CV ECHO MEAS - LVIDS: 1.68 CM
BH CV ECHO MEAS - LVOT AREA: 1.33 CM2
BH CV ECHO MEAS - LVOT DIAM: 1.3 CM
BH CV ECHO MEAS - LVPWD: 1.1 CM
BH CV ECHO MEAS - MED PEAK E' VEL: 5 CM/SEC
BH CV ECHO MEAS - MV A MAX VEL: 123 CM/SEC
BH CV ECHO MEAS - MV DEC SLOPE: 425 CM/SEC2
BH CV ECHO MEAS - MV DEC TIME: 0.27 SEC
BH CV ECHO MEAS - MV E MAX VEL: 114 CM/SEC
BH CV ECHO MEAS - MV E/A: 0.93
BH CV ECHO MEAS - MV MAX PG: 7.4 MMHG
BH CV ECHO MEAS - MV MEAN PG: 3 MMHG
BH CV ECHO MEAS - MV V2 VTI: 37.2 CM
BH CV ECHO MEAS - MVA(VTI): 0.8 CM2
BH CV ECHO MEAS - PA ACC TIME: 0.12 SEC
BH CV ECHO MEAS - PA V2 MAX: 86.1 CM/SEC
BH CV ECHO MEAS - RAP SYSTOLE: 3 MMHG
BH CV ECHO MEAS - RV MAX PG: 1.64 MMHG
BH CV ECHO MEAS - RV V1 MAX: 64 CM/SEC
BH CV ECHO MEAS - RV V1 VTI: 13.7 CM
BH CV ECHO MEAS - RVSP: 31.3 MMHG
BH CV ECHO MEAS - SV(LVOT): 29.9 ML
BH CV ECHO MEAS - SV(MOD-SP2): 30.9 ML
BH CV ECHO MEAS - SV(MOD-SP4): 32.3 ML
BH CV ECHO MEAS - TAPSE (>1.6): 2.43 CM
BH CV ECHO MEAS - TR MAX PG: 28.3 MMHG
BH CV ECHO MEAS - TR MAX VEL: 266 CM/SEC
BH CV ECHO MEASUREMENTS AVERAGE E/E' RATIO: 21.92
BH CV XLRA - RV BASE: 3.8 CM
BH CV XLRA - RV LENGTH: 5.9 CM
BH CV XLRA - RV MID: 2.19 CM
BH CV XLRA - TDI S': 13.8 CM/SEC
LEFT ATRIUM VOLUME INDEX: 19.1 ML/M2
LV EF 2D ECHO EST: 63 %

## 2023-09-28 PROCEDURE — 93306 TTE W/DOPPLER COMPLETE: CPT

## 2023-10-09 RX ORDER — METOPROLOL SUCCINATE 50 MG/1
TABLET, EXTENDED RELEASE ORAL
Qty: 90 TABLET | Refills: 10 | Status: SHIPPED | OUTPATIENT
Start: 2023-10-09

## 2023-10-26 ENCOUNTER — HOSPITAL ENCOUNTER (OUTPATIENT)
Dept: MAMMOGRAPHY | Facility: HOSPITAL | Age: 73
Discharge: HOME | End: 2023-10-26
Admitting: NURSE PRACTITIONER
Payer: MEDICARE

## 2023-10-26 DIAGNOSIS — Z12.31 VISIT FOR SCREENING MAMMOGRAM: ICD-10-CM

## 2023-10-26 PROCEDURE — 77067 SCR MAMMO BI INCL CAD: CPT

## 2023-10-26 PROCEDURE — 77063 BREAST TOMOSYNTHESIS BI: CPT

## 2023-10-30 DIAGNOSIS — G50.0 TRIGEMINAL NEURALGIA: ICD-10-CM

## 2023-10-30 RX ORDER — PREGABALIN 50 MG/1
50 CAPSULE ORAL 3 TIMES DAILY
Qty: 270 CAPSULE | Refills: 0 | Status: SHIPPED | OUTPATIENT
Start: 2023-10-30

## 2023-10-31 ENCOUNTER — OFFICE VISIT (OUTPATIENT)
Dept: FAMILY MEDICINE CLINIC | Facility: CLINIC | Age: 73
End: 2023-10-31
Payer: MEDICARE

## 2023-10-31 VITALS
SYSTOLIC BLOOD PRESSURE: 130 MMHG | DIASTOLIC BLOOD PRESSURE: 70 MMHG | BODY MASS INDEX: 21.6 KG/M2 | WEIGHT: 110 LBS | HEART RATE: 74 BPM | HEIGHT: 60 IN | OXYGEN SATURATION: 98 %

## 2023-10-31 DIAGNOSIS — I10 PRIMARY HYPERTENSION: Primary | ICD-10-CM

## 2023-10-31 DIAGNOSIS — R73.03 PRE-DIABETES: ICD-10-CM

## 2023-10-31 DIAGNOSIS — Z23 NEED FOR INFLUENZA VACCINATION: ICD-10-CM

## 2023-10-31 LAB
EXPIRATION DATE: ABNORMAL
HBA1C MFR BLD: 6.1 % (ref 4.5–5.7)
Lab: ABNORMAL

## 2023-10-31 RX ORDER — NABUMETONE 500 MG/1
500 TABLET, FILM COATED ORAL 2 TIMES DAILY PRN
COMMUNITY

## 2023-10-31 NOTE — PROGRESS NOTES
Subjective     Chief Complaint:    Chief Complaint   Patient presents with    Hypertension       History of Present Illness:   HTN, recently started on losartan that was uptitrated, continues norvasc and metoprolol, 2 home readings have been elevated over 150's. Ears will get red when her BP is up   No further syncope but is feeling dizzy at times, last for a second then goes away   Pre diabetes         Review of Systems  Gen- No fevers, chills  CV- No chest pain, palpitations  Resp- No cough, dyspnea  GI- No N/V/D, abd pain  Neuro-No dizziness, headaches      I have reviewed and/or updated the patient's past medical, surgical, family, social history and problem list as appropriate.     Medications:    Current Outpatient Medications:     amitriptyline (ELAVIL) 10 MG tablet, Take 1.5 tablets by mouth Every Evening., Disp: 135 tablet, Rfl: 1    amLODIPine (NORVASC) 10 MG tablet, TAKE 1 TABLET EVERY DAY AS DIRECTED, Disp: 90 tablet, Rfl: 1    atorvastatin (LIPITOR) 40 MG tablet, TAKE 1 TABLET EVERY NIGHT, Disp: 90 tablet, Rfl: 1    calcium-vitamin D (OSCAL-500) 500-200 MG-UNIT per tablet, Take 1 tablet by mouth 2 (Two) Times a Day., Disp: , Rfl:     carBAMazepine XR (TEGretol  XR) 400 MG 12 hr tablet, Take 1 tablet by mouth 2 (Two) Times a Day., Disp: 180 tablet, Rfl: 1    Cholecalciferol (VITAMIN D3) 1000 UNITS capsule, Take 1 capsule by mouth Daily., Disp: , Rfl:     escitalopram (Lexapro) 20 MG tablet, Take 1 tablet by mouth Daily., Disp: 90 tablet, Rfl: 3    losartan (Cozaar) 50 MG tablet, Take 1 tablet by mouth Daily., Disp: 90 tablet, Rfl: 1    melatonin 5 MG tablet tablet, Take 1 tablet by mouth., Disp: , Rfl:     metoprolol succinate XL (TOPROL-XL) 50 MG 24 hr tablet, TAKE 1 TABLET EVERY DAY, Disp: 90 tablet, Rfl: 10    nabumetone (RELAFEN) 500 MG tablet, Take 1 tablet by mouth 2 (Two) Times a Day As Needed for Mild Pain., Disp: , Rfl:     pregabalin (LYRICA) 50 MG capsule, TAKE 1 CAPSULE THREE TIMES  "DAILY, Disp: 270 capsule, Rfl: 0    VITAMIN B COMPLEX-C PO, Take  by mouth., Disp: , Rfl:     Current Facility-Administered Medications:     albuterol (PROVENTIL) nebulizer solution 0.083% 2.5 mg/3mL, 2.5 mg, Nebulization, Q6H PRN, ElielBerta quinones N, APRN, 2.5 mg at 11/20/18 1010    Allergies:  Allergies   Allergen Reactions    Ace Inhibitors Cough    Penicillins Other (See Comments)     Childhood allergy        Objective     Vital Signs:   Vitals:    10/31/23 1439   BP: 130/70   Pulse: 74   SpO2: 98%   Weight: 49.9 kg (110 lb)   Height: 152.4 cm (60\")     Body mass index is 21.48 kg/m².    Physical Exam:    Physical Exam  Vitals and nursing note reviewed.   Constitutional:       Appearance: She is well-developed.   HENT:      Head: Normocephalic and atraumatic.   Eyes:      Pupils: Pupils are equal, round, and reactive to light.   Cardiovascular:      Rate and Rhythm: Normal rate and regular rhythm.      Heart sounds: Normal heart sounds.   Pulmonary:      Effort: Pulmonary effort is normal.      Breath sounds: Normal breath sounds.   Abdominal:      General: Bowel sounds are normal. There is no distension.      Palpations: Abdomen is soft.      Tenderness: There is no abdominal tenderness.   Musculoskeletal:      Cervical back: Neck supple.   Skin:     General: Skin is warm and dry.   Neurological:      General: No focal deficit present.      Mental Status: She is alert and oriented to person, place, and time.   Psychiatric:         Mood and Affect: Mood normal.         Behavior: Behavior normal.         Assessment / Plan     Assessment/Plan:   Problem List Items Addressed This Visit       Hypertension - Primary    Relevant Medications    amLODIPine (NORVASC) 10 MG tablet    losartan (Cozaar) 50 MG tablet    metoprolol succinate XL (TOPROL-XL) 50 MG 24 hr tablet    Pre-diabetes    Relevant Orders    POC Glycosylated Hemoglobin (Hb A1C)     Other Visit Diagnoses       Need for influenza vaccination        Relevant " Orders    Fluzone High-Dose 65+yrs ([id number])          -- BP better on current regimen, continue  -- A1c 6.1, continue diet and exercise    Discussed plan of care in detail with pt today; pt verb understanding and agrees.    Follow up:  Return in about 3 months (around 1/31/2024).    Electronically signed by PETERSON Nguyen   10/31/2023 14:47 EDT      Please note that portions of this note were completed with a voice recognition program.

## 2023-11-19 DIAGNOSIS — G50.0 TRIGEMINAL NEURALGIA: ICD-10-CM

## 2023-11-20 RX ORDER — AMITRIPTYLINE HYDROCHLORIDE 10 MG/1
TABLET, FILM COATED ORAL
Qty: 135 TABLET | Refills: 5 | Status: SHIPPED | OUTPATIENT
Start: 2023-11-20

## 2023-11-27 ENCOUNTER — OFFICE VISIT (OUTPATIENT)
Dept: NEUROLOGY | Facility: CLINIC | Age: 73
End: 2023-11-27
Payer: MEDICARE

## 2023-11-27 VITALS
WEIGHT: 111 LBS | OXYGEN SATURATION: 95 % | BODY MASS INDEX: 21.79 KG/M2 | DIASTOLIC BLOOD PRESSURE: 70 MMHG | SYSTOLIC BLOOD PRESSURE: 140 MMHG | HEART RATE: 96 BPM | TEMPERATURE: 98.4 F | HEIGHT: 60 IN

## 2023-11-27 DIAGNOSIS — G50.0 TRIGEMINAL NEURALGIA: ICD-10-CM

## 2023-11-27 PROCEDURE — 1159F MED LIST DOCD IN RCRD: CPT | Performed by: NURSE PRACTITIONER

## 2023-11-27 PROCEDURE — 3077F SYST BP >= 140 MM HG: CPT | Performed by: NURSE PRACTITIONER

## 2023-11-27 PROCEDURE — 1160F RVW MEDS BY RX/DR IN RCRD: CPT | Performed by: NURSE PRACTITIONER

## 2023-11-27 PROCEDURE — 99214 OFFICE O/P EST MOD 30 MIN: CPT | Performed by: NURSE PRACTITIONER

## 2023-11-27 PROCEDURE — 3078F DIAST BP <80 MM HG: CPT | Performed by: NURSE PRACTITIONER

## 2023-11-27 RX ORDER — PREGABALIN 50 MG/1
50 CAPSULE ORAL 3 TIMES DAILY
Qty: 270 CAPSULE | Refills: 1 | Status: SHIPPED | OUTPATIENT
Start: 2023-11-27

## 2023-11-27 NOTE — PROGRESS NOTES
"     Follow Up Office Visit      Patient Name: Antonio Barnes  : 1950   MRN: [id number]     Chief Complaint:    Chief Complaint   Patient presents with    Follow-up     Trigeminal Neuralgia        History of Present Illness: Antonio Barnes is a 73 y.o. female who is here today to follow up with trigeminal neuralgia.  She was last seen here in the clinic on 2023.  She suffers from right facial pain.  She is currently on Lyrica 50 mg 3 times daily. She says over all she is doing ok with the current dose. She says she has times where she has some more severe pain but overall. She has some drowsiness with the medication but is tolerating. She has some balance issues as well due to her Lyrica. She denies any falls.    She is accompanied to the office visit today by her daughter.    History of Present Illness carried forward from 2023 office visit as follows: Antonio Barnes is a 72 y.o. female who is here today to follow up with  Facial pain secondary to trigeminal neuralgia. She has pain and numbness and says she prefers the numbness over the pain and says she had a procedure for this 2012 and says it caused some numbness initially and then some pain which has never gotten better.  The pain is right facial under the eye to the nasolabial fold. Overall she is able to manage on her current medication regimen.  She does report some afternoon drowsiness with her afternoon dose of pregabalin but it is not affecting her life and she says she is able to lay down and take a nap.\"  That is what [facility] is for\".  She is here for medication refills.     Subjective      Review of Systems:   Review of Systems   Neurological:  Positive for dizziness and numbness.       I have reviewed and the following portions of the patient's history were updated as appropriate: past family history, past medical history, past social history, past surgical history and problem list.    Medications:     Current Outpatient " "Medications:     amitriptyline (ELAVIL) 10 MG tablet, TAKE 1 AND 1/2 TABLETS EVERY EVENING (NEED MD APPOINTMENT), Disp: 135 tablet, Rfl: 5    amLODIPine (NORVASC) 10 MG tablet, TAKE 1 TABLET EVERY DAY AS DIRECTED, Disp: 90 tablet, Rfl: 1    atorvastatin (LIPITOR) 40 MG tablet, TAKE 1 TABLET EVERY NIGHT, Disp: 90 tablet, Rfl: 1    calcium-vitamin D (OSCAL-500) 500-200 MG-UNIT per tablet, Take 1 tablet by mouth 2 (Two) Times a Day., Disp: , Rfl:     carBAMazepine XR (TEGretol  XR) 400 MG 12 hr tablet, Take 1 tablet by mouth 2 (Two) Times a Day., Disp: 180 tablet, Rfl: 1    Cholecalciferol (VITAMIN D3) 1000 UNITS capsule, Take 1 capsule by mouth Daily., Disp: , Rfl:     escitalopram (Lexapro) 20 MG tablet, Take 1 tablet by mouth Daily., Disp: 90 tablet, Rfl: 3    losartan (Cozaar) 50 MG tablet, Take 1 tablet by mouth Daily., Disp: 90 tablet, Rfl: 1    melatonin 5 MG tablet tablet, Take 1 tablet by mouth., Disp: , Rfl:     metoprolol succinate XL (TOPROL-XL) 50 MG 24 hr tablet, TAKE 1 TABLET EVERY DAY, Disp: 90 tablet, Rfl: 10    nabumetone (RELAFEN) 500 MG tablet, Take 1 tablet by mouth 2 (Two) Times a Day As Needed for Mild Pain., Disp: , Rfl:     pregabalin (LYRICA) 50 MG capsule, Take 1 capsule by mouth 3 (Three) Times a Day., Disp: 270 capsule, Rfl: 1    VITAMIN B COMPLEX-C PO, Take  by mouth., Disp: , Rfl:     Current Facility-Administered Medications:     albuterol (PROVENTIL) nebulizer solution 0.083% 2.5 mg/3mL, 2.5 mg, Nebulization, Q6H PRN, Berta Julian, APRN, 2.5 mg at 11/20/18 1010    Allergies:   Allergies   Allergen Reactions    Ace Inhibitors Cough    Penicillins Other (See Comments)     Childhood allergy        Objective     Physical Exam:  Vital Signs:   Vitals:    11/27/23 1337   BP: 140/70   Pulse: 96   Temp: 98.4 °F (36.9 °C)   SpO2: 95%   Weight: 50.3 kg (111 lb)   Height: 152.4 cm (60\")   PainSc: 0-No pain     Body mass index is 21.68 kg/m².    Physical Exam  Constitutional:       Appearance: " Normal appearance.   HENT:      Head: Normocephalic.   Eyes:      Conjunctiva/sclera: Conjunctivae normal.   Pulmonary:      Effort: Pulmonary effort is normal.   Musculoskeletal:         General: Normal range of motion.   Skin:     General: Skin is warm and dry.   Neurological:      General: No focal deficit present.      Mental Status: She is alert and oriented to person, place, and time.      Cranial Nerves: Cranial nerves 2-12 are intact. No dysarthria.      Motor: Motor function is intact. No tremor.      Coordination: Coordination is intact.      Gait: Gait is intact.   Psychiatric:         Attention and Perception: Attention normal.         Mood and Affect: Mood and affect normal.         Speech: Speech normal.         Behavior: Behavior normal. Behavior is cooperative.         Thought Content: Thought content normal.         Cognition and Memory: Cognition normal.         Judgment: Judgment normal.         Neurologic Exam     Mental Status   Oriented to person, place, and time.   Speech: speech is normal     Cranial Nerves   Cranial nerves II through XII intact.     Gait, Coordination, and Reflexes     Gait  Gait: normal       Assessment / Plan      Assessment/Plan:   Diagnoses and all orders for this visit:    1. Trigeminal neuralgia  -     pregabalin (LYRICA) 50 MG capsule; Take 1 capsule by mouth 3 (Three) Times a Day.  Dispense: 270 capsule; Refill: 1           Lyrica refilled without change x 6 months. Indications and side effects discussed with patient she verbalizes understanding.  Patient will call in the interim if she has any questions or concerns or changes.    Follow Up:   Return in about 6 months (around 5/27/2024).    PETERSON Chaudhari, FNP-BC  [facility]

## 2023-12-18 ENCOUNTER — OFFICE VISIT (OUTPATIENT)
Dept: FAMILY MEDICINE CLINIC | Facility: CLINIC | Age: 73
End: 2023-12-18
Payer: MEDICARE

## 2023-12-18 VITALS
DIASTOLIC BLOOD PRESSURE: 80 MMHG | HEIGHT: 60 IN | OXYGEN SATURATION: 93 % | SYSTOLIC BLOOD PRESSURE: 160 MMHG | BODY MASS INDEX: 21.79 KG/M2 | HEART RATE: 75 BPM | WEIGHT: 111 LBS

## 2023-12-18 DIAGNOSIS — I10 PRIMARY HYPERTENSION: Primary | ICD-10-CM

## 2023-12-18 DIAGNOSIS — R09.81 NASAL CONGESTION: ICD-10-CM

## 2023-12-18 DIAGNOSIS — H92.02 LEFT EAR PAIN: ICD-10-CM

## 2023-12-18 PROCEDURE — 1160F RVW MEDS BY RX/DR IN RCRD: CPT | Performed by: NURSE PRACTITIONER

## 2023-12-18 PROCEDURE — 99214 OFFICE O/P EST MOD 30 MIN: CPT | Performed by: NURSE PRACTITIONER

## 2023-12-18 PROCEDURE — 1159F MED LIST DOCD IN RCRD: CPT | Performed by: NURSE PRACTITIONER

## 2023-12-18 PROCEDURE — 3077F SYST BP >= 140 MM HG: CPT | Performed by: NURSE PRACTITIONER

## 2023-12-18 PROCEDURE — 3079F DIAST BP 80-89 MM HG: CPT | Performed by: NURSE PRACTITIONER

## 2023-12-18 RX ORDER — OFLOXACIN 3 MG/ML
5 SOLUTION AURICULAR (OTIC) DAILY
Qty: 2 ML | Refills: 0 | Status: SHIPPED | OUTPATIENT
Start: 2023-12-18 | End: 2023-12-23

## 2023-12-18 RX ORDER — LOSARTAN POTASSIUM 100 MG/1
100 TABLET ORAL DAILY
Qty: 90 TABLET | Refills: 1 | Status: SHIPPED | OUTPATIENT
Start: 2023-12-18

## 2023-12-18 NOTE — PROGRESS NOTES
Subjective     Chief Complaint:    Chief Complaint   Patient presents with    Ear Fullness     Left       History of Present Illness:   Ear fullness on left, trouble hearing out of it, popping and cracking, has runny nose as well, seems a little worse   BP still elevated on losartan, norvasc, metoprolol    Review of Systems  Gen- No fevers, chills  CV- No chest pain, palpitations  Resp- No cough, dyspnea  GI- No N/V/D, abd pain  Neuro-No dizziness, headaches      I have reviewed and/or updated the patient's past medical, surgical, family, social history and problem list as appropriate.     Medications:    Current Outpatient Medications:     amitriptyline (ELAVIL) 10 MG tablet, TAKE 1 AND 1/2 TABLETS EVERY EVENING (NEED MD APPOINTMENT), Disp: 135 tablet, Rfl: 5    amLODIPine (NORVASC) 10 MG tablet, TAKE 1 TABLET EVERY DAY AS DIRECTED, Disp: 90 tablet, Rfl: 1    atorvastatin (LIPITOR) 40 MG tablet, TAKE 1 TABLET EVERY NIGHT, Disp: 90 tablet, Rfl: 1    calcium-vitamin D (OSCAL-500) 500-200 MG-UNIT per tablet, Take 1 tablet by mouth 2 (Two) Times a Day., Disp: , Rfl:     Cholecalciferol (VITAMIN D3) 1000 UNITS capsule, Take 1 capsule by mouth Daily., Disp: , Rfl:     escitalopram (Lexapro) 20 MG tablet, Take 1 tablet by mouth Daily., Disp: 90 tablet, Rfl: 3    losartan (Cozaar) 100 MG tablet, Take 1 tablet by mouth Daily., Disp: 90 tablet, Rfl: 1    melatonin 5 MG tablet tablet, Take 1 tablet by mouth., Disp: , Rfl:     metoprolol succinate XL (TOPROL-XL) 50 MG 24 hr tablet, TAKE 1 TABLET EVERY DAY, Disp: 90 tablet, Rfl: 10    nabumetone (RELAFEN) 500 MG tablet, Take 1 tablet by mouth 2 (Two) Times a Day As Needed for Mild Pain., Disp: , Rfl:     pregabalin (LYRICA) 50 MG capsule, Take 1 capsule by mouth 3 (Three) Times a Day., Disp: 270 capsule, Rfl: 1    VITAMIN B COMPLEX-C PO, Take  by mouth., Disp: , Rfl:     carBAMazepine XR (TEGretol  XR) 400 MG 12 hr tablet, TAKE 1 TABLET TWICE DAILY, Disp: 180 tablet, Rfl:  "1    Current Facility-Administered Medications:     albuterol (PROVENTIL) nebulizer solution 0.083% 2.5 mg/3mL, 2.5 mg, Nebulization, Q6H PRN, Berta Julian, APRN, 2.5 mg at 11/20/18 1010    Allergies:  Allergies   Allergen Reactions    Ace Inhibitors Cough    Penicillins Other (See Comments)     Childhood allergy        Objective     Vital Signs:   Vitals:    12/18/23 1501   BP: 160/80   Pulse: 75   SpO2: 93%   Weight: 50.3 kg (111 lb)   Height: 152.4 cm (60\")     Body mass index is 21.68 kg/m².    Physical Exam:    Physical Exam  Constitutional:       Appearance: Normal appearance. She is well-developed.   HENT:      Head: Normocephalic and atraumatic.      Right Ear: Tympanic membrane, ear canal and external ear normal.      Left Ear: Tympanic membrane and external ear normal. Swelling present.      Nose: Nose normal.      Mouth/Throat:      Pharynx: Uvula midline.   Eyes:      Pupils: Pupils are equal, round, and reactive to light.   Cardiovascular:      Rate and Rhythm: Normal rate and regular rhythm.      Heart sounds: Normal heart sounds. No murmur heard.     No friction rub. No gallop.   Pulmonary:      Effort: Pulmonary effort is normal.      Breath sounds: Normal breath sounds.   Abdominal:      General: Bowel sounds are normal.      Palpations: Abdomen is soft.      Tenderness: There is no abdominal tenderness.   Musculoskeletal:      Cervical back: Neck supple.   Lymphadenopathy:      Head:      Right side of head: No submental, submandibular, tonsillar, preauricular or posterior auricular adenopathy.      Left side of head: No submental, submandibular, tonsillar, preauricular or posterior auricular adenopathy.      Cervical: No cervical adenopathy.   Skin:     General: Skin is warm and dry.   Neurological:      General: No focal deficit present.      Mental Status: She is alert and oriented to person, place, and time.   Psychiatric:         Mood and Affect: Mood normal.         Behavior: Behavior " normal.         Assessment / Plan     Assessment/Plan:   Problem List Items Addressed This Visit       Hypertension - Primary    Relevant Medications    amLODIPine (NORVASC) 10 MG tablet    metoprolol succinate XL (TOPROL-XL) 50 MG 24 hr tablet    losartan (Cozaar) 100 MG tablet     Other Visit Diagnoses       Nasal congestion        Left ear pain                -- increase losartan  -- ofloxacin drops for ear    Discussed plan of care in detail with pt today; pt verb understanding and agrees.    Follow up:  As scheduled    Electronically signed by PETERSON Nguyen   12/18/2023 15:24 EST      Please note that portions of this note were completed with a voice recognition program.

## 2023-12-21 DIAGNOSIS — G50.0 TRIGEMINAL NEURALGIA: ICD-10-CM

## 2024-01-02 RX ORDER — CARBAMAZEPINE 400 MG/1
400 TABLET, EXTENDED RELEASE ORAL 2 TIMES DAILY
Qty: 180 TABLET | Refills: 1 | Status: SHIPPED | OUTPATIENT
Start: 2024-01-02

## 2024-01-02 NOTE — TELEPHONE ENCOUNTER
"  Caller: Antonio Barnes \"Bacilio\"    Relationship: Self    Best call back number: [phone]     What is the best time to reach you: ANY    Who are you requesting to speak with (clinical staff, provider,  specific staff member): PROVIDER/STAFF    What was the call regarding: PATIENT TELEPHONED TO [facility] FAXED OVER FORM FOR REFILL APPROVAL.    PLEASE INITIATE PRIOR AUTH FOR CARBAMAZEPINE XR OR CALL TO [facility]-THANK YOU   "

## 2024-03-15 DIAGNOSIS — I10 ESSENTIAL HYPERTENSION: ICD-10-CM

## 2024-03-15 RX ORDER — ATORVASTATIN CALCIUM 40 MG/1
TABLET, FILM COATED ORAL
Qty: 90 TABLET | Refills: 3 | Status: SHIPPED | OUTPATIENT
Start: 2024-03-15

## 2024-03-15 RX ORDER — AMLODIPINE BESYLATE 10 MG/1
TABLET ORAL
Qty: 90 TABLET | Refills: 3 | Status: SHIPPED | OUTPATIENT
Start: 2024-03-15

## 2024-04-03 DIAGNOSIS — F32.1 MODERATE SINGLE CURRENT EPISODE OF MAJOR DEPRESSIVE DISORDER: ICD-10-CM

## 2024-04-03 RX ORDER — ESCITALOPRAM OXALATE 20 MG/1
20 TABLET ORAL DAILY
Qty: 90 TABLET | Refills: 3 | Status: SHIPPED | OUTPATIENT
Start: 2024-04-03

## 2024-04-08 ENCOUNTER — OFFICE VISIT (OUTPATIENT)
Dept: FAMILY MEDICINE CLINIC | Facility: CLINIC | Age: 74
End: 2024-04-08
Payer: MEDICARE

## 2024-04-08 VITALS
BODY MASS INDEX: 21.6 KG/M2 | HEIGHT: 60 IN | HEART RATE: 72 BPM | SYSTOLIC BLOOD PRESSURE: 140 MMHG | WEIGHT: 110 LBS | OXYGEN SATURATION: 97 % | DIASTOLIC BLOOD PRESSURE: 80 MMHG

## 2024-04-08 DIAGNOSIS — E78.2 MIXED HYPERLIPIDEMIA: ICD-10-CM

## 2024-04-08 DIAGNOSIS — M81.0 OSTEOPOROSIS, UNSPECIFIED OSTEOPOROSIS TYPE, UNSPECIFIED PATHOLOGICAL FRACTURE PRESENCE: ICD-10-CM

## 2024-04-08 DIAGNOSIS — F32.1 MODERATE SINGLE CURRENT EPISODE OF MAJOR DEPRESSIVE DISORDER: ICD-10-CM

## 2024-04-08 DIAGNOSIS — G50.0 TRIGEMINAL NEURALGIA: ICD-10-CM

## 2024-04-08 DIAGNOSIS — D56.8 OTHER THALASSEMIA: ICD-10-CM

## 2024-04-08 DIAGNOSIS — R73.03 PRE-DIABETES: ICD-10-CM

## 2024-04-08 DIAGNOSIS — I10 PRIMARY HYPERTENSION: Primary | ICD-10-CM

## 2024-04-08 PROCEDURE — 3079F DIAST BP 80-89 MM HG: CPT | Performed by: NURSE PRACTITIONER

## 2024-04-08 PROCEDURE — 1160F RVW MEDS BY RX/DR IN RCRD: CPT | Performed by: NURSE PRACTITIONER

## 2024-04-08 PROCEDURE — 1159F MED LIST DOCD IN RCRD: CPT | Performed by: NURSE PRACTITIONER

## 2024-04-08 PROCEDURE — 99214 OFFICE O/P EST MOD 30 MIN: CPT | Performed by: NURSE PRACTITIONER

## 2024-04-08 PROCEDURE — G2211 COMPLEX E/M VISIT ADD ON: HCPCS | Performed by: NURSE PRACTITIONER

## 2024-04-08 PROCEDURE — 3077F SYST BP >= 140 MM HG: CPT | Performed by: NURSE PRACTITIONER

## 2024-04-08 NOTE — PROGRESS NOTES
Subjective     Chief Complaint:    Chief Complaint   Patient presents with    Hypertension       History of Present Illness:     Takes blood pressures at home, and notes that BP's is 160-170's/80-90's. Notices that her blood pressure is better in the mornings, and notes that her right ear gets red when her BP is high. Taking norvasc (AM), Norvasc (PM), metoprolol (afternoon).   Trigeminal neuralgia controlled on current medication regimen, follows with neurology, has numbness all the time   Does have some eye problems, specifically with right eye, and has not been to an eye doctor - does affect driving   Mood is better and stable on lexapro, is still having some feelings of depression, denies wanting any medication changes   Sleeping well, but does get woken up by cat sometimes at night   Has been having some constipation, but drinks milk which helps her have a BM, typically drinking once daily in the AM   Has had a runny nose and cough sporadically, put Adriel's on her neck which helps, has flonase and xyzal at home     Review of Systems  Gen- No fevers, chills  CV- No chest pain, palpitations  Resp- No cough, dyspnea  GI- No N/V/D, abd pain  Neuro-No dizziness, headaches      I have reviewed and/or updated the patient's past medical, surgical, family, social history and problem list as appropriate.     Medications:    Current Outpatient Medications:     amitriptyline (ELAVIL) 10 MG tablet, TAKE 1 AND 1/2 TABLETS EVERY EVENING (NEED MD APPOINTMENT), Disp: 135 tablet, Rfl: 5    amLODIPine (NORVASC) 10 MG tablet, TAKE 1 TABLET EVERY DAY AS DIRECTED, Disp: 90 tablet, Rfl: 3    atorvastatin (LIPITOR) 40 MG tablet, TAKE 1 TABLET EVERY NIGHT, Disp: 90 tablet, Rfl: 3    calcium-vitamin D (OSCAL-500) 500-200 MG-UNIT per tablet, Take 1 tablet by mouth 2 (Two) Times a Day., Disp: , Rfl:     carBAMazepine XR (TEGretol  XR) 400 MG 12 hr tablet, TAKE 1 TABLET TWICE DAILY, Disp: 180 tablet, Rfl: 1    Cholecalciferol (VITAMIN  "D3) 1000 UNITS capsule, Take 1 capsule by mouth Daily., Disp: , Rfl:     escitalopram (LEXAPRO) 20 MG tablet, TAKE 1 TABLET EVERY DAY, Disp: 90 tablet, Rfl: 3    losartan (Cozaar) 100 MG tablet, Take 1 tablet by mouth Daily., Disp: 90 tablet, Rfl: 1    melatonin 5 MG tablet tablet, Take 1 tablet by mouth., Disp: , Rfl:     metoprolol succinate XL (TOPROL-XL) 50 MG 24 hr tablet, TAKE 1 TABLET EVERY DAY, Disp: 90 tablet, Rfl: 10    pregabalin (LYRICA) 50 MG capsule, Take 1 capsule by mouth 3 (Three) Times a Day., Disp: 270 capsule, Rfl: 1    VITAMIN B COMPLEX-C PO, Take  by mouth., Disp: , Rfl:     nabumetone (RELAFEN) 500 MG tablet, Take 1 tablet by mouth 2 (Two) Times a Day As Needed for Mild Pain., Disp: , Rfl:     Current Facility-Administered Medications:     albuterol (PROVENTIL) nebulizer solution 0.083% 2.5 mg/3mL, 2.5 mg, Nebulization, Q6H PRN, Eliel, Berta N, APRN, 2.5 mg at 11/20/18 1010    Allergies:  Allergies   Allergen Reactions    Ace Inhibitors Cough    Penicillins Other (See Comments)     Childhood allergy        Objective     Vital Signs:   Vitals:    04/08/24 1353   BP: 140/80   Pulse: 72   SpO2: 97%   Weight: 49.9 kg (110 lb)   Height: 152.4 cm (60\")     Body mass index is 21.48 kg/m².    Physical Exam:    Physical Exam  Constitutional:       Appearance: Normal appearance.   HENT:      Head: Normocephalic and atraumatic.      Right Ear: Tympanic membrane, ear canal and external ear normal.      Left Ear: Tympanic membrane, ear canal and external ear normal.      Nose: Rhinorrhea present.      Mouth/Throat:      Mouth: Mucous membranes are moist.   Eyes:      General: Visual field deficit present.      Pupils: Pupils are equal, round, and reactive to light.      Comments: Right vision changes r/t trigeminal neuralgia    Cardiovascular:      Rate and Rhythm: Normal rate and regular rhythm.      Pulses: Normal pulses.      Heart sounds: Normal heart sounds.   Pulmonary:      Effort: Pulmonary effort " is normal.      Breath sounds: Normal breath sounds.   Abdominal:      General: Bowel sounds are normal.      Palpations: Abdomen is soft.      Tenderness: There is no abdominal tenderness.   Musculoskeletal:      Cervical back: Neck supple.   Skin:     General: Skin is warm and dry.   Neurological:      General: No focal deficit present.      Mental Status: She is alert. Mental status is at baseline.      Comments: Right side of face numbness, visual deficit, and mouth numbness  Denies drooling   Denies swallowing difficulties    Psychiatric:         Mood and Affect: Mood normal.         Behavior: Behavior normal.       Assessment / Plan     Assessment/Plan:   Problem List Items Addressed This Visit       Mixed hyperlipidemia    Relevant Medications    atorvastatin (LIPITOR) 40 MG tablet    Other Relevant Orders    Lipid Panel    Hypertension - Primary    Relevant Medications    metoprolol succinate XL (TOPROL-XL) 50 MG 24 hr tablet    losartan (Cozaar) 100 MG tablet    amLODIPine (NORVASC) 10 MG tablet    Osteoporosis    Relevant Orders    Vitamin D,25-Hydroxy    Thalassemia    Relevant Orders    Comprehensive Metabolic Panel    CBC Auto Differential    Trigeminal neuralgia    Relevant Medications    amitriptyline (ELAVIL) 10 MG tablet    pregabalin (LYRICA) 50 MG capsule    carBAMazepine XR (TEGretol  XR) 400 MG 12 hr tablet    Moderate single current episode of major depressive disorder    Relevant Medications    amitriptyline (ELAVIL) 10 MG tablet    escitalopram (LEXAPRO) 20 MG tablet    Pre-diabetes    Relevant Orders    Hemoglobin A1c     -- Continue maintenance medications   -- Follow-up with neurology at next scheduled appt   -- Encouraged patient to use flonase and/or xyzal for allergy sx   -- Encouraged healthy diet, exercise, and trying to sit outside for at least 10 minutes daily for depression sx  -- Annual labs  -- Encouraged follow-up with optometrist for eye concerns     Discussed plan of care in  detail with pt today; pt verb understanding and agrees.    Follow up:  4 months for MWV    I, Ritu WINKLER, personally performed the services described in this documentation, as scribed by Sandra WINKLER student in my presence, and is both accurate and complete   Electronically signed by PETERSON Nguyen   04/08/2024 13:55 EDT      Please note that portions of this note were completed with a voice recognition program.

## 2024-04-09 LAB
25(OH)D3+25(OH)D2 SERPL-MCNC: 30.3 NG/ML (ref 30–100)
ALBUMIN SERPL-MCNC: 4.4 G/DL (ref 3.5–5.2)
ALBUMIN/GLOB SERPL: 1.9 G/DL
ALP SERPL-CCNC: 111 U/L (ref 39–117)
ALT SERPL-CCNC: 19 U/L (ref 1–33)
AST SERPL-CCNC: 24 U/L (ref 1–32)
BILIRUB SERPL-MCNC: 0.2 MG/DL (ref 0–1.2)
BUN SERPL-MCNC: 10 MG/DL (ref 8–23)
BUN/CREAT SERPL: 12.8 (ref 7–25)
CALCIUM SERPL-MCNC: 9.3 MG/DL (ref 8.6–10.5)
CHLORIDE SERPL-SCNC: 99 MMOL/L (ref 98–107)
CHOLEST SERPL-MCNC: 234 MG/DL (ref 0–200)
CO2 SERPL-SCNC: 27.6 MMOL/L (ref 22–29)
CREAT SERPL-MCNC: 0.78 MG/DL (ref 0.57–1)
DIFFERENTIAL COMMENT: NORMAL
EGFRCR SERPLBLD CKD-EPI 2021: 79.8 ML/MIN/1.73
EOSINOPHIL # BLD MANUAL: 0.07 10*3/MM3 (ref 0–0.4)
EOSINOPHIL NFR BLD MANUAL: 1 % (ref 0.3–6.2)
ERYTHROCYTE [DISTWIDTH] IN BLOOD BY AUTOMATED COUNT: 15.2 % (ref 12.3–15.4)
GLOBULIN SER CALC-MCNC: 2.3 GM/DL
GLUCOSE SERPL-MCNC: 93 MG/DL (ref 65–99)
HBA1C MFR BLD: 6.3 % (ref 4.8–5.6)
HCT VFR BLD AUTO: 36.5 % (ref 34–46.6)
HDLC SERPL-MCNC: 55 MG/DL (ref 40–60)
HGB BLD-MCNC: 11.3 G/DL (ref 12–15.9)
LDLC SERPL CALC-MCNC: 134 MG/DL (ref 0–100)
LYMPHOCYTES # BLD MANUAL: 1.95 10*3/MM3 (ref 0.7–3.1)
LYMPHOCYTES NFR BLD MANUAL: 28.3 % (ref 19.6–45.3)
MCH RBC QN AUTO: 21.7 PG (ref 26.6–33)
MCHC RBC AUTO-ENTMCNC: 31 G/DL (ref 31.5–35.7)
MCV RBC AUTO: 70.1 FL (ref 79–97)
MONOCYTES # BLD MANUAL: 0.42 10*3/MM3 (ref 0.1–0.9)
MONOCYTES NFR BLD MANUAL: 6.1 % (ref 5–12)
NEUTROPHILS # BLD MANUAL: 4.44 10*3/MM3 (ref 1.7–7)
NEUTROPHILS NFR BLD MANUAL: 64.6 % (ref 42.7–76)
NRBC BLD AUTO-RTO: 0 /100 WBC (ref 0–0.2)
PLATELET # BLD AUTO: 328 10*3/MM3 (ref 140–450)
PLATELET BLD QL SMEAR: NORMAL
POTASSIUM SERPL-SCNC: 4.6 MMOL/L (ref 3.5–5.2)
PROT SERPL-MCNC: 6.7 G/DL (ref 6–8.5)
RBC # BLD AUTO: 5.21 10*6/MM3 (ref 3.77–5.28)
RBC MORPH BLD: NORMAL
SODIUM SERPL-SCNC: 136 MMOL/L (ref 136–145)
TRIGL SERPL-MCNC: 253 MG/DL (ref 0–150)
VLDLC SERPL CALC-MCNC: 45 MG/DL (ref 5–40)
WBC # BLD AUTO: 6.88 10*3/MM3 (ref 3.4–10.8)

## 2024-05-16 DIAGNOSIS — I10 PRIMARY HYPERTENSION: ICD-10-CM

## 2024-05-16 RX ORDER — LOSARTAN POTASSIUM 100 MG/1
100 TABLET ORAL DAILY
Qty: 90 TABLET | Refills: 3 | Status: SHIPPED | OUTPATIENT
Start: 2024-05-16

## 2024-06-03 DIAGNOSIS — G50.0 TRIGEMINAL NEURALGIA: ICD-10-CM

## 2024-06-03 RX ORDER — PREGABALIN 50 MG/1
50 CAPSULE ORAL 3 TIMES DAILY
Qty: 270 CAPSULE | OUTPATIENT
Start: 2024-06-03

## 2024-06-03 RX ORDER — PREGABALIN 50 MG/1
50 CAPSULE ORAL 3 TIMES DAILY
Qty: 270 CAPSULE | Refills: 0 | Status: SHIPPED | OUTPATIENT
Start: 2024-06-03

## 2024-06-03 RX ORDER — CARBAMAZEPINE 400 MG/1
400 TABLET, EXTENDED RELEASE ORAL 2 TIMES DAILY
Qty: 180 TABLET | Refills: 0 | Status: SHIPPED | OUTPATIENT
Start: 2024-06-03

## 2024-06-03 NOTE — TELEPHONE ENCOUNTER
Caller: FRANCOISE Ramos call back number: [phone]     Requested Prescriptions: NEED NEW SCRIPT SENT FOR 3 MONTH REFILLS  CARBAMAZEPINE  MG   PREGABALIN 50 MG   Requested Prescriptions      No prescriptions requested or ordered in this encounter        Pharmacy where request should be sent:  Pharmacy:   [facility] [address] - [phone] PH - [phone] FX     Last office visit with prescribing clinician: 11/27/2023   Last telemedicine visit with prescribing clinician: Visit date not found   Next office visit with prescribing clinician: 7/10/2024     Additional details provided by patient:     Does the patient have less than a 3 day supply:  [] Yes  [] No    Would you like a call back once the refill request has been completed: [] Yes [] No    If the office needs to give you a call back, can they leave a voicemail: [] Yes [] No    Joel Ochoa   06/03/24 10:50 EDT

## 2024-07-10 ENCOUNTER — OFFICE VISIT (OUTPATIENT)
Dept: NEUROLOGY | Facility: CLINIC | Age: 74
End: 2024-07-10
Payer: MEDICARE

## 2024-07-10 VITALS
WEIGHT: 111 LBS | HEART RATE: 77 BPM | OXYGEN SATURATION: 96 % | DIASTOLIC BLOOD PRESSURE: 80 MMHG | TEMPERATURE: 98.2 F | SYSTOLIC BLOOD PRESSURE: 136 MMHG | HEIGHT: 60 IN | BODY MASS INDEX: 21.79 KG/M2

## 2024-07-10 DIAGNOSIS — G50.0 TRIGEMINAL NEURALGIA: ICD-10-CM

## 2024-07-10 RX ORDER — PREGABALIN 50 MG/1
50 CAPSULE ORAL 3 TIMES DAILY
Qty: 270 CAPSULE | Refills: 1 | Status: SHIPPED | OUTPATIENT
Start: 2024-07-10

## 2024-07-10 RX ORDER — AMITRIPTYLINE HYDROCHLORIDE 10 MG/1
10 TABLET, FILM COATED ORAL NIGHTLY
Qty: 90 TABLET | Refills: 1 | Status: SHIPPED | OUTPATIENT
Start: 2024-07-10

## 2024-07-10 RX ORDER — CARBAMAZEPINE 400 MG/1
400 TABLET, EXTENDED RELEASE ORAL 2 TIMES DAILY
Qty: 180 TABLET | Refills: 1 | Status: SHIPPED | OUTPATIENT
Start: 2024-07-10

## 2024-07-10 NOTE — PROGRESS NOTES
Follow Up Office Visit      Patient Name: Antonio Barnes  : 1950   MRN: [id number]     Chief Complaint:    Chief Complaint   Patient presents with    Follow-up     Trigeminal Neuralgia       History of Present Illness: Antonio Barnes is a 73 y.o. female who is here today to follow up with trigeminal neuralgia.  She was last seen here on 2023.  She is accompanied to the office visit today by her daughter.  She denies any changes in her right facial pain.  It waxes and wanes but overall is doing okay.  She was wanting to consider plastic surgery on her face and wanted advice on this.  She is aware that that could increase in flare her trigeminal neuralgia.  She denies any falls since her last office visit.  Her daughter states that her blood pressure meds were changed since her last office visit and she has been doing better with balance and she has not been having any dizziness.  Her daughter lives with her.        - Past medical history: Anemia, thalassemia, mixed hyperlipidemia, vitamin D deficiency, hypertension    Subjective      Review of Systems:   Review of Systems   Neurological:         Right facial pain       I have reviewed and the following portions of the patient's history were updated as appropriate: past family history, past medical history, past social history, past surgical history and problem list.    Medications:     Current Outpatient Medications:     amitriptyline (ELAVIL) 10 MG tablet, Take 1 tablet by mouth Every Night., Disp: 90 tablet, Rfl: 1    amLODIPine (NORVASC) 10 MG tablet, TAKE 1 TABLET EVERY DAY AS DIRECTED, Disp: 90 tablet, Rfl: 3    atorvastatin (LIPITOR) 40 MG tablet, TAKE 1 TABLET EVERY NIGHT, Disp: 90 tablet, Rfl: 3    calcium-vitamin D (OSCAL-500) 500-200 MG-UNIT per tablet, Take 1 tablet by mouth 2 (Two) Times a Day., Disp: , Rfl:     carBAMazepine XR (TEGretol  XR) 400 MG 12 hr tablet, Take 1 tablet by mouth 2 (Two) Times a Day., Disp: 180 tablet, Rfl: 1    " Cholecalciferol (VITAMIN D3) 1000 UNITS capsule, Take 1 capsule by mouth Daily., Disp: , Rfl:     escitalopram (LEXAPRO) 20 MG tablet, TAKE 1 TABLET EVERY DAY, Disp: 90 tablet, Rfl: 3    losartan (COZAAR) 100 MG tablet, TAKE 1 TABLET EVERY DAY, Disp: 90 tablet, Rfl: 3    melatonin 5 MG tablet tablet, Take 1 tablet by mouth., Disp: , Rfl:     metoprolol succinate XL (TOPROL-XL) 50 MG 24 hr tablet, TAKE 1 TABLET EVERY DAY, Disp: 90 tablet, Rfl: 10    nabumetone (RELAFEN) 500 MG tablet, Take 1 tablet by mouth 2 (Two) Times a Day As Needed for Mild Pain., Disp: , Rfl:     pregabalin (LYRICA) 50 MG capsule, Take 1 capsule by mouth 3 (Three) Times a Day., Disp: 270 capsule, Rfl: 1    VITAMIN B COMPLEX-C PO, Take  by mouth., Disp: , Rfl:     Current Facility-Administered Medications:     albuterol (PROVENTIL) nebulizer solution 0.083% 2.5 mg/3mL, 2.5 mg, Nebulization, Q6H PRN, Gem, Berta N, APRN, 2.5 mg at 11/20/18 1010    Allergies:   Allergies   Allergen Reactions    Ace Inhibitors Cough    Penicillins Other (See Comments)     Childhood allergy        Objective     Physical Exam:  Vital Signs:   Vitals:    07/10/24 1459   BP: 136/80   Pulse: 77   Temp: 98.2 °F (36.8 °C)   SpO2: 96%   Weight: 50.3 kg (111 lb)   Height: 152.4 cm (60\")   PainSc: 0-No pain     Body mass index is 21.68 kg/m².    Physical Exam  Constitutional:       Appearance: Normal appearance.   HENT:      Head: Normocephalic.   Eyes:      Conjunctiva/sclera: Conjunctivae normal.   Pulmonary:      Effort: Pulmonary effort is normal.   Musculoskeletal:         General: Normal range of motion.   Skin:     General: Skin is warm and dry.   Neurological:      General: No focal deficit present.      Mental Status: She is alert and oriented to person, place, and time.      Cranial Nerves: No dysarthria.      Motor: Motor function is intact. No tremor.      Coordination: Coordination is intact.      Gait: Gait is intact.   Psychiatric:         Attention and " Perception: Attention normal.         Mood and Affect: Mood and affect normal.         Speech: Speech normal.         Behavior: Behavior normal. Behavior is cooperative.         Thought Content: Thought content normal.         Cognition and Memory: Cognition normal.         Judgment: Judgment normal.         Neurologic Exam     Mental Status   Oriented to person, place, and time.   Attention: normal. Concentration: normal.   Speech: speech is normal   Level of consciousness: alert  Knowledge: good.   Normal comprehension.     Gait, Coordination, and Reflexes     Gait  Gait: normal       Assessment / Plan      Assessment/Plan:   Diagnoses and all orders for this visit:    1. Trigeminal neuralgia  -     pregabalin (LYRICA) 50 MG capsule; Take 1 capsule by mouth 3 (Three) Times a Day.  Dispense: 270 capsule; Refill: 1  -     carBAMazepine XR (TEGretol  XR) 400 MG 12 hr tablet; Take 1 tablet by mouth 2 (Two) Times a Day.  Dispense: 180 tablet; Refill: 1  -     amitriptyline (ELAVIL) 10 MG tablet; Take 1 tablet by mouth Every Night.  Dispense: 90 tablet; Refill: 1         Medications refilled x 6 months without change.  Patient will call in the interim if she has any questions or concerns or changes.    Follow Up:   Return in about 6 months (around 1/10/2025).    PETERSON Chaudhari, FNP-[facility]

## 2024-08-09 ENCOUNTER — OFFICE VISIT (OUTPATIENT)
Dept: FAMILY MEDICINE CLINIC | Facility: CLINIC | Age: 74
End: 2024-08-09
Payer: MEDICARE

## 2024-08-09 VITALS
BODY MASS INDEX: 21.6 KG/M2 | WEIGHT: 110 LBS | DIASTOLIC BLOOD PRESSURE: 64 MMHG | HEIGHT: 60 IN | OXYGEN SATURATION: 96 % | HEART RATE: 65 BPM | SYSTOLIC BLOOD PRESSURE: 126 MMHG

## 2024-08-09 DIAGNOSIS — G50.0 TRIGEMINAL NEURALGIA: ICD-10-CM

## 2024-08-09 DIAGNOSIS — I10 PRIMARY HYPERTENSION: Primary | ICD-10-CM

## 2024-08-09 DIAGNOSIS — R09.81 NASAL CONGESTION: ICD-10-CM

## 2024-08-09 DIAGNOSIS — K64.9 HEMORRHOIDS, UNSPECIFIED HEMORRHOID TYPE: ICD-10-CM

## 2024-08-09 DIAGNOSIS — K59.00 CONSTIPATION, UNSPECIFIED CONSTIPATION TYPE: ICD-10-CM

## 2024-08-09 DIAGNOSIS — F32.1 MODERATE SINGLE CURRENT EPISODE OF MAJOR DEPRESSIVE DISORDER: ICD-10-CM

## 2024-08-09 DIAGNOSIS — R73.03 PRE-DIABETES: ICD-10-CM

## 2024-08-09 LAB
EXPIRATION DATE: ABNORMAL
HBA1C MFR BLD: 6.1 % (ref 4.5–5.7)
Lab: ABNORMAL

## 2024-08-09 PROCEDURE — 1159F MED LIST DOCD IN RCRD: CPT | Performed by: NURSE PRACTITIONER

## 2024-08-09 PROCEDURE — 3074F SYST BP LT 130 MM HG: CPT | Performed by: NURSE PRACTITIONER

## 2024-08-09 PROCEDURE — 3078F DIAST BP <80 MM HG: CPT | Performed by: NURSE PRACTITIONER

## 2024-08-09 PROCEDURE — G2211 COMPLEX E/M VISIT ADD ON: HCPCS | Performed by: NURSE PRACTITIONER

## 2024-08-09 PROCEDURE — 3044F HG A1C LEVEL LT 7.0%: CPT | Performed by: NURSE PRACTITIONER

## 2024-08-09 PROCEDURE — 1160F RVW MEDS BY RX/DR IN RCRD: CPT | Performed by: NURSE PRACTITIONER

## 2024-08-09 PROCEDURE — 99214 OFFICE O/P EST MOD 30 MIN: CPT | Performed by: NURSE PRACTITIONER

## 2024-08-09 PROCEDURE — 1126F AMNT PAIN NOTED NONE PRSNT: CPT | Performed by: NURSE PRACTITIONER

## 2024-08-09 PROCEDURE — 83036 HEMOGLOBIN GLYCOSYLATED A1C: CPT | Performed by: NURSE PRACTITIONER

## 2024-08-09 RX ORDER — POLYETHYLENE GLYCOL 3350 17 G/17G
17 POWDER, FOR SOLUTION ORAL DAILY
Qty: 578 G | Refills: 5 | Status: SHIPPED | OUTPATIENT
Start: 2024-08-09

## 2024-08-09 RX ORDER — HYDROCORTISONE 25 MG/G
CREAM TOPICAL 2 TIMES DAILY
Qty: 28 G | Refills: 2 | Status: SHIPPED | OUTPATIENT
Start: 2024-08-09

## 2024-08-09 NOTE — PROGRESS NOTES
"      Subjective     Chief Complaint:    Chief Complaint   Patient presents with    Follow-up     Routine 4mo follow up for primary hypertension.        History of Present Illness:   Has not been taking blood pressure at home takes amlodipine, losartan, and metoprolol.  but has not felt like her BP has been high at home because she \"has not been feeling bad\".  Trigeminal neuralgia controlled on current medication regimen, follows with neurology, has numbness all the time   Does have some eye problems, specifically with right eye, and has not been to an eye doctor but is considering going - does affect driving   Mood is better and stable on lexapro, is still having some feelings of depression, denies wanting any medication changes   Sleeping well, but does get woken up by cat sometimes at night   Has been having some constipation, uses prunes once a day, is having bowel movements once a day, has had to use pepto bismol for acid reflux occasionally which helps. Endorses some intermittent abdominal pain, denies hard stools or having to strain, some loose stools occurring.   Has had a runny nose and cough sporadically, put Adriel's on her neck which helps, tried flonase but didn't help    Review of Systems  Gen- No fevers, chills  CV- No chest pain, palpitations  Resp- No cough, dyspnea  GI- No N/V/D, abd pain  Neuro-No dizziness, headaches      I have reviewed and/or updated the patient's past medical, surgical, family, social history and problem list as appropriate.     Medications:    Current Outpatient Medications:     amitriptyline (ELAVIL) 10 MG tablet, Take 1 tablet by mouth Every Night., Disp: 90 tablet, Rfl: 1    amLODIPine (NORVASC) 10 MG tablet, TAKE 1 TABLET EVERY DAY AS DIRECTED, Disp: 90 tablet, Rfl: 3    atorvastatin (LIPITOR) 40 MG tablet, TAKE 1 TABLET EVERY NIGHT, Disp: 90 tablet, Rfl: 3    calcium-vitamin D (OSCAL-500) 500-200 MG-UNIT per tablet, Take 1 tablet by mouth 2 (Two) Times a Day., Disp: , Rfl: " "    carBAMazepine XR (TEGretol  XR) 400 MG 12 hr tablet, Take 1 tablet by mouth 2 (Two) Times a Day., Disp: 180 tablet, Rfl: 1    Cholecalciferol (VITAMIN D3) 1000 UNITS capsule, Take 1 capsule by mouth Daily., Disp: , Rfl:     escitalopram (LEXAPRO) 20 MG tablet, TAKE 1 TABLET EVERY DAY, Disp: 90 tablet, Rfl: 3    losartan (COZAAR) 100 MG tablet, TAKE 1 TABLET EVERY DAY, Disp: 90 tablet, Rfl: 3    melatonin 5 MG tablet tablet, Take 1 tablet by mouth., Disp: , Rfl:     metoprolol succinate XL (TOPROL-XL) 50 MG 24 hr tablet, TAKE 1 TABLET EVERY DAY, Disp: 90 tablet, Rfl: 10    nabumetone (RELAFEN) 500 MG tablet, Take 1 tablet by mouth 2 (Two) Times a Day As Needed for Mild Pain., Disp: , Rfl:     pregabalin (LYRICA) 50 MG capsule, Take 1 capsule by mouth 3 (Three) Times a Day., Disp: 270 capsule, Rfl: 1    VITAMIN B COMPLEX-C PO, Take  by mouth., Disp: , Rfl:     Hydrocortisone, Perianal, (Proctozone-HC) 2.5 % rectal cream, Insert  into the rectum 2 (Two) Times a Day., Disp: 28 g, Rfl: 2    polyethylene glycol (MIRALAX) 17 GM/SCOOP powder, Take 17 g by mouth Daily., Disp: 578 g, Rfl: 5    Current Facility-Administered Medications:     albuterol (PROVENTIL) nebulizer solution 0.083% 2.5 mg/3mL, 2.5 mg, Nebulization, Q6H PRN, Berta Julian N, APRN, 2.5 mg at 11/20/18 1010    Allergies:  Allergies   Allergen Reactions    Ace Inhibitors Cough    Penicillins Other (See Comments)     Childhood allergy        Objective     Vital Signs:   Vitals:    08/09/24 1404   BP: 126/64   Pulse: 65   SpO2: 96%   Weight: 49.9 kg (110 lb)   Height: 152.4 cm (60\")     Body mass index is 21.48 kg/m².    Physical Exam:    Physical Exam  Constitutional:       Appearance: She is normal weight.   HENT:      Head: Normocephalic and atraumatic.      Nose: Nose normal.      Mouth/Throat:      Mouth: Mucous membranes are moist.      Pharynx: Oropharynx is clear. No posterior oropharyngeal erythema.   Cardiovascular:      Rate and Rhythm: Normal " rate and regular rhythm.      Pulses: Normal pulses.      Heart sounds: Normal heart sounds.   Pulmonary:      Effort: Pulmonary effort is normal.      Breath sounds: Normal breath sounds.   Abdominal:      General: Bowel sounds are normal.      Palpations: Abdomen is soft.      Tenderness: There is abdominal tenderness in the right lower quadrant.   Musculoskeletal:      Cervical back: Normal range of motion and neck supple.   Skin:     General: Skin is warm and dry.   Neurological:      General: No focal deficit present.      Mental Status: She is alert. Mental status is at baseline.   Psychiatric:         Mood and Affect: Mood normal.         Behavior: Behavior normal.       Assessment / Plan     Assessment/Plan:   Problem List Items Addressed This Visit       Hypertension - Primary    Relevant Medications    metoprolol succinate XL (TOPROL-XL) 50 MG 24 hr tablet    amLODIPine (NORVASC) 10 MG tablet    losartan (COZAAR) 100 MG tablet    Trigeminal neuralgia    Relevant Medications    pregabalin (LYRICA) 50 MG capsule    carBAMazepine XR (TEGretol  XR) 400 MG 12 hr tablet    amitriptyline (ELAVIL) 10 MG tablet    Moderate single current episode of major depressive disorder    Relevant Medications    escitalopram (LEXAPRO) 20 MG tablet    amitriptyline (ELAVIL) 10 MG tablet    Pre-diabetes    Relevant Orders    POC Glycosylated Hemoglobin (Hb A1C) (Completed)     Other Visit Diagnoses       Nasal congestion        Constipation, unspecified constipation type        Relevant Medications    polyethylene glycol (MIRALAX) 17 GM/SCOOP powder    Hemorrhoids, unspecified hemorrhoid type        Relevant Medications    Hydrocortisone, Perianal, (Proctozone-HC) 2.5 % rectal cream          -- Continue current blood pressure medication regimen, controlled   -- Neurology managing trigeminal neuralgia, keep f/u appointment   -- Continue lexapro for mood, controlled   -- Continue prunes and start miralax daily to help with  constipation  -- Start hydrocortisone two times a day as needed for hemorrhoids. Discussed that reducing episodes of constipation can help with hemorrhoids.     Discussed plan of care in detail with pt today; pt verb understanding and agrees.    Follow up:  3 months    IRitu, personally performed the services described in this documentation, as scribed by Sandra Fregoso, [employer] student in my presence, and is both accurate and complete       Please note that portions of this note were completed with a voice recognition program.

## 2024-10-25 RX ORDER — METOPROLOL SUCCINATE 50 MG/1
TABLET, EXTENDED RELEASE ORAL
Qty: 90 TABLET | Refills: 3 | Status: SHIPPED | OUTPATIENT
Start: 2024-10-25

## 2024-11-13 ENCOUNTER — OFFICE VISIT (OUTPATIENT)
Dept: FAMILY MEDICINE CLINIC | Facility: CLINIC | Age: 74
End: 2024-11-13
Payer: MEDICARE

## 2024-11-13 VITALS
WEIGHT: 109 LBS | SYSTOLIC BLOOD PRESSURE: 120 MMHG | OXYGEN SATURATION: 97 % | BODY MASS INDEX: 21.4 KG/M2 | DIASTOLIC BLOOD PRESSURE: 70 MMHG | HEART RATE: 83 BPM | HEIGHT: 60 IN

## 2024-11-13 DIAGNOSIS — R73.03 PREDIABETES: ICD-10-CM

## 2024-11-13 DIAGNOSIS — R73.03 PRE-DIABETES: ICD-10-CM

## 2024-11-13 DIAGNOSIS — Z23 NEED FOR INFLUENZA VACCINATION: ICD-10-CM

## 2024-11-13 DIAGNOSIS — Z23 NEED FOR PNEUMOCOCCAL 20-VALENT CONJUGATE VACCINATION: ICD-10-CM

## 2024-11-13 DIAGNOSIS — D56.8 OTHER THALASSEMIA: ICD-10-CM

## 2024-11-13 DIAGNOSIS — G50.0 TRIGEMINAL NEURALGIA: ICD-10-CM

## 2024-11-13 DIAGNOSIS — E55.9 VITAMIN D DEFICIENCY: ICD-10-CM

## 2024-11-13 DIAGNOSIS — R53.83 FATIGUE, UNSPECIFIED TYPE: ICD-10-CM

## 2024-11-13 DIAGNOSIS — F32.1 MODERATE SINGLE CURRENT EPISODE OF MAJOR DEPRESSIVE DISORDER: ICD-10-CM

## 2024-11-13 DIAGNOSIS — I10 PRIMARY HYPERTENSION: ICD-10-CM

## 2024-11-13 DIAGNOSIS — Z00.00 MEDICARE ANNUAL WELLNESS VISIT, SUBSEQUENT: Primary | ICD-10-CM

## 2024-11-13 DIAGNOSIS — E78.2 MIXED HYPERLIPIDEMIA: ICD-10-CM

## 2024-11-13 PROCEDURE — 90662 IIV NO PRSV INCREASED AG IM: CPT | Performed by: NURSE PRACTITIONER

## 2024-11-13 PROCEDURE — G0008 ADMIN INFLUENZA VIRUS VAC: HCPCS | Performed by: NURSE PRACTITIONER

## 2024-11-13 PROCEDURE — 90677 PCV20 VACCINE IM: CPT | Performed by: NURSE PRACTITIONER

## 2024-11-13 PROCEDURE — 3078F DIAST BP <80 MM HG: CPT | Performed by: NURSE PRACTITIONER

## 2024-11-13 PROCEDURE — 99214 OFFICE O/P EST MOD 30 MIN: CPT | Performed by: NURSE PRACTITIONER

## 2024-11-13 PROCEDURE — 1160F RVW MEDS BY RX/DR IN RCRD: CPT | Performed by: NURSE PRACTITIONER

## 2024-11-13 PROCEDURE — 1126F AMNT PAIN NOTED NONE PRSNT: CPT | Performed by: NURSE PRACTITIONER

## 2024-11-13 PROCEDURE — 1159F MED LIST DOCD IN RCRD: CPT | Performed by: NURSE PRACTITIONER

## 2024-11-13 PROCEDURE — G0009 ADMIN PNEUMOCOCCAL VACCINE: HCPCS | Performed by: NURSE PRACTITIONER

## 2024-11-13 PROCEDURE — G0439 PPPS, SUBSEQ VISIT: HCPCS | Performed by: NURSE PRACTITIONER

## 2024-11-13 PROCEDURE — 1170F FXNL STATUS ASSESSED: CPT | Performed by: NURSE PRACTITIONER

## 2024-11-13 PROCEDURE — 3074F SYST BP LT 130 MM HG: CPT | Performed by: NURSE PRACTITIONER

## 2024-11-13 RX ORDER — FOLIC ACID 1 MG/1
1 TABLET ORAL DAILY
Qty: 90 TABLET | Refills: 3 | Status: SHIPPED | OUTPATIENT
Start: 2024-11-13

## 2024-11-13 NOTE — ASSESSMENT & PLAN NOTE
-- start folic acid  Orders:    folic acid (FOLVITE) 1 MG tablet; Take 1 tablet by mouth Daily.    Ferritin    Iron Profile

## 2024-11-13 NOTE — ASSESSMENT & PLAN NOTE
-- continue f/u with neuro, consider stopping elavil as she has chronic complaints of fatigue, will discuss with neuro

## 2024-11-13 NOTE — PROGRESS NOTES
Subjective   The ABCs of the Annual Wellness Visit  Medicare Wellness Visit      Antonio Barnes is a 74 y.o. patient who presents for a Medicare Wellness Visit.    The following portions of the patient's history were reviewed and   updated as appropriate: allergies, current medications, past family history, past medical history, past social history, past surgical history, and problem list.    Compared to one year ago, the patient's physical   health is the same.  Compared to one year ago, the patient's mental   health is the same.    Recent Hospitalizations:  She was not admitted to the hospital during the last year.     Current Medical Providers:  Patient Care Team:  Ritu Chavez APRN as PCP - General (Nurse Practitioner)    Outpatient Medications Prior to Visit   Medication Sig Dispense Refill    amitriptyline (ELAVIL) 10 MG tablet Take 1 tablet by mouth Every Night. 90 tablet 1    amLODIPine (NORVASC) 10 MG tablet TAKE 1 TABLET EVERY DAY AS DIRECTED 90 tablet 3    atorvastatin (LIPITOR) 40 MG tablet TAKE 1 TABLET EVERY NIGHT 90 tablet 3    calcium-vitamin D (OSCAL-500) 500-200 MG-UNIT per tablet Take 1 tablet by mouth 2 (Two) Times a Day.      carBAMazepine XR (TEGretol  XR) 400 MG 12 hr tablet Take 1 tablet by mouth 2 (Two) Times a Day. 180 tablet 1    Cholecalciferol (VITAMIN D3) 1000 UNITS capsule Take 1 capsule by mouth Daily.      escitalopram (LEXAPRO) 20 MG tablet TAKE 1 TABLET EVERY DAY 90 tablet 3    Hydrocortisone, Perianal, (Proctozone-HC) 2.5 % rectal cream Insert  into the rectum 2 (Two) Times a Day. 28 g 2    losartan (COZAAR) 100 MG tablet TAKE 1 TABLET EVERY DAY 90 tablet 3    melatonin 5 MG tablet tablet Take 1 tablet by mouth.      metoprolol succinate XL (TOPROL-XL) 50 MG 24 hr tablet TAKE 1 TABLET EVERY DAY 90 tablet 3    nabumetone (RELAFEN) 500 MG tablet Take 1 tablet by mouth 2 (Two) Times a Day As Needed for Mild Pain.      polyethylene glycol (MIRALAX) 17 GM/SCOOP powder Take 17 g  "by mouth Daily. 578 g 5    pregabalin (LYRICA) 50 MG capsule Take 1 capsule by mouth 3 (Three) Times a Day. 270 capsule 1    VITAMIN B COMPLEX-C PO Take  by mouth.       Facility-Administered Medications Prior to Visit   Medication Dose Route Frequency Provider Last Rate Last Admin    albuterol (PROVENTIL) nebulizer solution 0.083% 2.5 mg/3mL  2.5 mg Nebulization Q6H PRN Berta Julian N, APRN   2.5 mg at 11/20/18 1010     No opioid medication identified on active medication list. I have reviewed chart for other potential  high risk medication/s and harmful drug interactions in the elderly.      Aspirin is not on active medication list.  Aspirin use is not indicated based on review of current medical condition/s. Risk of harm outweighs potential benefits.  .    Patient Active Problem List   Diagnosis    Anemia    Mixed hyperlipidemia    Hypertension    Osteoporosis    Thalassemia    Trigeminal neuralgia    Vitamin D deficiency    Post-menopausal    Moderate single current episode of major depressive disorder    Pre-diabetes     Advance Care Planning Advance Directive is not on file.  ACP discussion was held with the patient during this visit. Patient does not have an advance directive, declines further assistance.            Objective   Vitals:    11/13/24 1456   BP: 120/70   BP Location: Left arm   Patient Position: Sitting   Cuff Size: Adult   Pulse: 83   SpO2: 97%   Weight: 49.4 kg (109 lb)   Height: 152.4 cm (60\")   PainSc: 0-No pain       Estimated body mass index is 21.29 kg/m² as calculated from the following:    Height as of this encounter: 152.4 cm (60\").    Weight as of this encounter: 49.4 kg (109 lb).    BMI is within normal parameters. No other follow-up for BMI required.       Does the patient have evidence of cognitive impairment? No                                                                                                Health  Risk Assessment    Smoking Status:  Social History     Tobacco Use "   Smoking Status Former    Current packs/day: 0.00    Average packs/day: 1 pack/day for 37.8 years (37.8 ttl pk-yrs)    Types: Cigarettes    Start date: 1969    Quit date: 2006    Years since quittin.0    Passive exposure: Past   Smokeless Tobacco Never   Tobacco Comments    Quit smoking 2007     Alcohol Consumption:  Social History     Substance and Sexual Activity   Alcohol Use Never       Fall Risk Screen  STEADI Fall Risk Assessment was completed, and patient is at LOW risk for falls.Assessment completed on:2024    Depression Screening   Little interest or pleasure in doing things? Over half   Feeling down, depressed, or hopeless? Over half   PHQ-2 Total Score 4   Trouble falling or staying asleep, or sleeping too much? Several days   Feeling tired or having little energy? Over half   Poor appetite or overeating? Not at all   Feeling bad about yourself - or that you are a failure or have let yourself or your family down? Not at all   Trouble concentrating on things, such as reading the newspaper or watching television? Not at all   Moving or speaking so slowly that other people could have noticed? Or the opposite - being so fidgety or restless that you have been moving around a lot more than usual? Not at all   Thoughts that you would be better off dead, or of hurting yourself in some way? Not at all   PHQ-9 Total Score 7   If you checked off any problems, how difficult have these problems made it for you to do your work, take care of things at home, or get along with other people? Not difficult at all      Health Habits and Functional and Cognitive Screenin/13/2024     2:59 PM   Functional & Cognitive Status   Do you have difficulty preparing food and eating? No   Do you have difficulty bathing yourself, getting dressed or grooming yourself? No   Do you have difficulty using the toilet? No   Do you have difficulty moving around from place to place? No   Do you have trouble  with steps or getting out of a bed or a chair? No   Current Diet Unhealthy Diet   Dental Exam Up to date   Eye Exam Not up to date   Exercise (times per week) 5 times per week   Current Exercises Include Home Fitness Gym   Do you need help using the phone?  No   Are you deaf or do you have serious difficulty hearing?  No   Do you need help to go to places out of walking distance? No   Do you need help shopping? No   Do you need help preparing meals?  No   Do you need help with housework?  No   Do you need help with laundry? No   Do you need help taking your medications? No   Do you need help managing money? No   Do you ever drive or ride in a car without wearing a seat belt? No   Have you felt unusual stress, anger or loneliness in the last month? No   Who do you live with? Child   If you need help, do you have trouble finding someone available to you? No   Have you been bothered in the last four weeks by sexual problems? No   Do you have difficulty concentrating, remembering or making decisions? No           Age-appropriate Screening Schedule:  Refer to the list below for future screening recommendations based on patient's age, sex and/or medical conditions. Orders for these recommended tests are listed in the plan section. The patient has been provided with a written plan.    Health Maintenance List  Health Maintenance   Topic Date Due    Pneumococcal Vaccine 65+ (1 of 2 - PCV) 09/26/1956    DXA SCAN  01/25/2023    INFLUENZA VACCINE  08/01/2024    COVID-19 Vaccine (6 - 2024-25 season) 09/01/2024    RSV Vaccine - Adults (1 - 1-dose 75+ series) Never done    TDAP/TD VACCINES (2 - Tdap) 02/27/2025 (Originally 1/20/2009)    LIPID PANEL  04/08/2025    ANNUAL WELLNESS VISIT  11/13/2025    COLORECTAL CANCER SCREENING  03/14/2026    ZOSTER VACCINE  Addressed    HEPATITIS C SCREENING  Discontinued    MAMMOGRAM  Discontinued                                                                                                       "                                          CMS Preventative Services Quick Reference  Risk Factors Identified During Encounter  Immunizations Discussed/Encouraged: Influenza and Prevnar 20 (Pneumococcal 20-valent conjugate)    The above risks/problems have been discussed with the patient.  Pertinent information has been shared with the patient in the After Visit Summary.  An After Visit Summary and PPPS were made available to the patient.    Follow Up:   Next Medicare Wellness visit to be scheduled in 1 year.         Additional E&M Note during same encounter follows:  Patient has additional, significant, and separately identifiable condition(s)/problem(s) that require work above and beyond the Medicare Wellness Visit     Chief Complaint  Medicare Wellness-subsequent (Pt presents in clinic today for wellness exam)    Subjective   HPI  Bacilio is also being seen today for additional medical problem/s.  HTN, controlled on norvasc, losartan, metoprolol, has felt well at home  Trigeminal neurolagia, notes pain is more annoying and numb feeling at time, no sharp pain, follows neurology on tegretol, elavil, and lyrdica  HLD on statin  Depression, refractory, on lexapro, she feels tired all the time,                   Objective   Vital Signs:  /70 (BP Location: Left arm, Patient Position: Sitting, Cuff Size: Adult)   Pulse 83   Ht 152.4 cm (60\")   Wt 49.4 kg (109 lb)   SpO2 97%   BMI 21.29 kg/m²   Physical Exam  Constitutional:       Appearance: Normal appearance. She is well-developed.   HENT:      Head: Normocephalic and atraumatic.      Right Ear: Tympanic membrane, ear canal and external ear normal.      Left Ear: Tympanic membrane, ear canal and external ear normal.      Nose: Nose normal.      Mouth/Throat:      Pharynx: Uvula midline.   Eyes:      Pupils: Pupils are equal, round, and reactive to light.   Neck:      Thyroid: No thyromegaly.      Vascular: No carotid bruit.   Cardiovascular:      Rate and " Rhythm: Normal rate and regular rhythm.      Heart sounds: Normal heart sounds. No murmur heard.     No friction rub. No gallop.   Pulmonary:      Effort: Pulmonary effort is normal.      Breath sounds: Normal breath sounds.   Abdominal:      General: Bowel sounds are normal.      Palpations: Abdomen is soft.      Tenderness: There is no abdominal tenderness.   Musculoskeletal:      Cervical back: Neck supple.   Lymphadenopathy:      Head:      Right side of head: No submental, submandibular, tonsillar, preauricular or posterior auricular adenopathy.      Left side of head: No submental, submandibular, tonsillar, preauricular or posterior auricular adenopathy.      Cervical: No cervical adenopathy.   Skin:     General: Skin is warm and dry.   Neurological:      General: No focal deficit present.      Mental Status: She is alert and oriented to person, place, and time.   Psychiatric:         Mood and Affect: Mood normal.         Behavior: Behavior normal.                       Assessment and Plan Additional age appropriate preventative wellness advice topics were discussed during today's preventative wellness exam(some topics already addressed during AWV portion of the note above):    Physical Activity: Advised cardiovascular activity 150 minutes per week as tolerated. (example brisk walk for 30 minutes, 5 days a week).           Primary hypertension  -- controlled on current regimen, continue         Trigeminal neuralgia  -- continue f/u with neuro, consider stopping elavil as she has chronic complaints of fatigue, will discuss with neuro       Moderate single current episode of major depressive disorder  -- relatively controlled with lexapro         Pre-diabetes  -- check a1c       Mixed hyperlipidemia   -- continue statin         Other thalassemia  -- start folic acid  Orders:    folic acid (FOLVITE) 1 MG tablet; Take 1 tablet by mouth Daily.    Ferritin    Iron Profile    Vitamin D deficiency    Orders:     Vitamin D,25-Hydroxy    Fatigue, unspecified type    Orders:    Vitamin B12    Folate    CBC Auto Differential    Comprehensive Metabolic Panel    TSH Rfx On Abnormal To Free T4    Prediabetes    Orders:    Hemoglobin A1c    Medicare annual wellness visit, subsequent  -- wellness visit performed, avoid prolonged fasting periods, ensure good hydration, stay active, yearly eye exam         Need for pneumococcal 20-valent conjugate vaccination         Need for influenza vaccination                 Follow Up   Return in about 3 months (around 2/13/2025).  Patient was given instructions and counseling regarding her condition or for health maintenance advice. Please see specific information pulled into the AVS if appropriate.

## 2024-11-13 NOTE — Clinical Note
She complains of fatigue everytime I see her. Her trigeminal neuralgia seems to be decent. Could we consider a trial off elavil or even a sleep study (unless I find a reason for her fatigue in her labs)

## 2024-11-14 LAB
25(OH)D3+25(OH)D2 SERPL-MCNC: 22 NG/ML (ref 30–100)
ALBUMIN SERPL-MCNC: 4.5 G/DL (ref 3.8–4.8)
ALP SERPL-CCNC: 126 IU/L (ref 44–121)
ALT SERPL-CCNC: 15 IU/L (ref 0–32)
AST SERPL-CCNC: 26 IU/L (ref 0–40)
BASOPHILS # BLD AUTO: 0 X10E3/UL (ref 0–0.2)
BASOPHILS NFR BLD AUTO: 0 %
BILIRUB SERPL-MCNC: <0.2 MG/DL (ref 0–1.2)
BUN SERPL-MCNC: 8 MG/DL (ref 8–27)
BUN/CREAT SERPL: 13 (ref 12–28)
CALCIUM SERPL-MCNC: 9.1 MG/DL (ref 8.7–10.3)
CHLORIDE SERPL-SCNC: 99 MMOL/L (ref 96–106)
CO2 SERPL-SCNC: 24 MMOL/L (ref 20–29)
CREAT SERPL-MCNC: 0.6 MG/DL (ref 0.57–1)
EGFRCR SERPLBLD CKD-EPI 2021: 94 ML/MIN/1.73
EOSINOPHIL # BLD AUTO: 0.2 X10E3/UL (ref 0–0.4)
EOSINOPHIL NFR BLD AUTO: 3 %
ERYTHROCYTE [DISTWIDTH] IN BLOOD BY AUTOMATED COUNT: 15.9 % (ref 11.7–15.4)
FERRITIN SERPL-MCNC: 104 NG/ML (ref 15–150)
FOLATE SERPL-MCNC: >20 NG/ML
GLOBULIN SER CALC-MCNC: 2.4 G/DL (ref 1.5–4.5)
GLUCOSE SERPL-MCNC: 94 MG/DL (ref 70–99)
HBA1C MFR BLD: 6.3 % (ref 4.8–5.6)
HCT VFR BLD AUTO: 36.6 % (ref 34–46.6)
HGB BLD-MCNC: 11.5 G/DL (ref 11.1–15.9)
IMM GRANULOCYTES # BLD AUTO: 0 X10E3/UL (ref 0–0.1)
IMM GRANULOCYTES NFR BLD AUTO: 0 %
IRON SATN MFR SERPL: 33 % (ref 15–55)
IRON SERPL-MCNC: 93 UG/DL (ref 27–139)
LYMPHOCYTES # BLD AUTO: 2.4 X10E3/UL (ref 0.7–3.1)
LYMPHOCYTES NFR BLD AUTO: 35 %
MCH RBC QN AUTO: 22.8 PG (ref 26.6–33)
MCHC RBC AUTO-ENTMCNC: 31.4 G/DL (ref 31.5–35.7)
MCV RBC AUTO: 73 FL (ref 79–97)
MONOCYTES # BLD AUTO: 0.5 X10E3/UL (ref 0.1–0.9)
MONOCYTES NFR BLD AUTO: 7 %
NEUTROPHILS # BLD AUTO: 3.7 X10E3/UL (ref 1.4–7)
NEUTROPHILS NFR BLD AUTO: 55 %
PLATELET # BLD AUTO: 333 X10E3/UL (ref 150–450)
POTASSIUM SERPL-SCNC: 4 MMOL/L (ref 3.5–5.2)
PROT SERPL-MCNC: 6.9 G/DL (ref 6–8.5)
RBC # BLD AUTO: 5.04 X10E6/UL (ref 3.77–5.28)
SODIUM SERPL-SCNC: 137 MMOL/L (ref 134–144)
TIBC SERPL-MCNC: 280 UG/DL (ref 250–450)
TSH SERPL DL<=0.005 MIU/L-ACNC: 2.25 UIU/ML (ref 0.45–4.5)
UIBC SERPL-MCNC: 187 UG/DL (ref 118–369)
VIT B12 SERPL-MCNC: 696 PG/ML (ref 232–1245)
WBC # BLD AUTO: 6.9 X10E3/UL (ref 3.4–10.8)

## 2024-12-12 ENCOUNTER — TRANSCRIBE ORDERS (OUTPATIENT)
Dept: ADMINISTRATIVE | Facility: HOSPITAL | Age: 74
End: 2024-12-12
Payer: MEDICARE

## 2024-12-12 DIAGNOSIS — Z12.31 VISIT FOR SCREENING MAMMOGRAM: Primary | ICD-10-CM

## 2025-01-06 DIAGNOSIS — G50.0 TRIGEMINAL NEURALGIA: ICD-10-CM

## 2025-01-06 DIAGNOSIS — I10 ESSENTIAL HYPERTENSION: ICD-10-CM

## 2025-01-07 RX ORDER — CARBAMAZEPINE 400 MG/1
400 TABLET, EXTENDED RELEASE ORAL 2 TIMES DAILY
Qty: 180 TABLET | Refills: 0 | Status: SHIPPED | OUTPATIENT
Start: 2025-01-07

## 2025-01-07 RX ORDER — ATORVASTATIN CALCIUM 40 MG/1
TABLET, FILM COATED ORAL
Qty: 90 TABLET | Refills: 3 | Status: SHIPPED | OUTPATIENT
Start: 2025-01-07

## 2025-01-07 RX ORDER — AMLODIPINE BESYLATE 10 MG/1
TABLET ORAL
Qty: 90 TABLET | Refills: 3 | Status: SHIPPED | OUTPATIENT
Start: 2025-01-07

## 2025-01-22 DIAGNOSIS — G50.0 TRIGEMINAL NEURALGIA: ICD-10-CM

## 2025-01-22 DIAGNOSIS — F32.1 MODERATE SINGLE CURRENT EPISODE OF MAJOR DEPRESSIVE DISORDER: ICD-10-CM

## 2025-01-22 RX ORDER — ESCITALOPRAM OXALATE 20 MG/1
20 TABLET ORAL DAILY
Qty: 90 TABLET | Refills: 3 | Status: SHIPPED | OUTPATIENT
Start: 2025-01-22

## 2025-01-22 RX ORDER — AMITRIPTYLINE HYDROCHLORIDE 10 MG/1
10 TABLET ORAL NIGHTLY
Qty: 90 TABLET | Refills: 3 | Status: SHIPPED | OUTPATIENT
Start: 2025-01-22

## 2025-02-14 ENCOUNTER — OFFICE VISIT (OUTPATIENT)
Dept: FAMILY MEDICINE CLINIC | Facility: CLINIC | Age: 75
End: 2025-02-14
Payer: MEDICARE

## 2025-02-14 VITALS
WEIGHT: 113 LBS | HEART RATE: 73 BPM | DIASTOLIC BLOOD PRESSURE: 80 MMHG | HEIGHT: 60 IN | OXYGEN SATURATION: 96 % | BODY MASS INDEX: 22.19 KG/M2 | RESPIRATION RATE: 16 BRPM | SYSTOLIC BLOOD PRESSURE: 122 MMHG

## 2025-02-14 DIAGNOSIS — D56.8 OTHER THALASSEMIA: ICD-10-CM

## 2025-02-14 DIAGNOSIS — R73.03 PRE-DIABETES: Primary | ICD-10-CM

## 2025-02-14 DIAGNOSIS — G50.0 TRIGEMINAL NEURALGIA: ICD-10-CM

## 2025-02-14 DIAGNOSIS — I10 ESSENTIAL HYPERTENSION: ICD-10-CM

## 2025-02-14 DIAGNOSIS — E78.2 MIXED HYPERLIPIDEMIA: ICD-10-CM

## 2025-02-14 DIAGNOSIS — D64.9 ANEMIA, UNSPECIFIED TYPE: ICD-10-CM

## 2025-02-14 DIAGNOSIS — F32.1 MODERATE SINGLE CURRENT EPISODE OF MAJOR DEPRESSIVE DISORDER: ICD-10-CM

## 2025-02-14 DIAGNOSIS — I10 PRIMARY HYPERTENSION: ICD-10-CM

## 2025-02-14 LAB
EXPIRATION DATE: ABNORMAL
HBA1C MFR BLD: 6 % (ref 4.5–5.7)
Lab: ABNORMAL

## 2025-02-14 PROCEDURE — 1126F AMNT PAIN NOTED NONE PRSNT: CPT | Performed by: NURSE PRACTITIONER

## 2025-02-14 PROCEDURE — 3044F HG A1C LEVEL LT 7.0%: CPT | Performed by: NURSE PRACTITIONER

## 2025-02-14 PROCEDURE — 83036 HEMOGLOBIN GLYCOSYLATED A1C: CPT | Performed by: NURSE PRACTITIONER

## 2025-02-14 PROCEDURE — 3079F DIAST BP 80-89 MM HG: CPT | Performed by: NURSE PRACTITIONER

## 2025-02-14 PROCEDURE — 99214 OFFICE O/P EST MOD 30 MIN: CPT | Performed by: NURSE PRACTITIONER

## 2025-02-14 PROCEDURE — 3074F SYST BP LT 130 MM HG: CPT | Performed by: NURSE PRACTITIONER

## 2025-02-14 NOTE — PROGRESS NOTES
Subjective     Chief Complaint:    Chief Complaint   Patient presents with    Hypertension    Depression       History of Present Illness:   HTN, controlled on norvasc, losartan, metoprolol, feeling well at home  Trigeminal neurolagia, notes pain is more annoying and numb feeling at time, no sharp pain, follows neurology on tegretol, elavil, and lyrica. We tried to stop her elavil but she was not able to tolerate her symptoms without so she restarted it  HLD on statin  Depression, refractory, on lexapro, up and down, still grieves the lost of her   Constipation on miralax, oranges help her as well   Thalassemia, on folic acid        Review of Systems  Gen- No fevers, chills  CV- No chest pain, palpitations  Resp- No cough, dyspnea  GI- No N/V/D, abd pain  Neuro-No dizziness, headaches      I have reviewed and/or updated the patient's past medical, surgical, family, social history and problem list as appropriate.     Medications:    Current Outpatient Medications:     amLODIPine (NORVASC) 10 MG tablet, TAKE 1 TABLET EVERY DAY AS DIRECTED, Disp: 90 tablet, Rfl: 3    atorvastatin (LIPITOR) 40 MG tablet, TAKE 1 TABLET EVERY NIGHT, Disp: 90 tablet, Rfl: 3    calcium-vitamin D (OSCAL-500) 500-200 MG-UNIT per tablet, Take 1 tablet by mouth 2 (Two) Times a Day., Disp: , Rfl:     Cholecalciferol (VITAMIN D3) 1000 UNITS capsule, Take 1 capsule by mouth Daily., Disp: , Rfl:     escitalopram (LEXAPRO) 20 MG tablet, TAKE 1 TABLET EVERY DAY, Disp: 90 tablet, Rfl: 3    folic acid (FOLVITE) 1 MG tablet, Take 1 tablet by mouth Daily., Disp: 90 tablet, Rfl: 3    Hydrocortisone, Perianal, (Proctozone-HC) 2.5 % rectal cream, Insert  into the rectum 2 (Two) Times a Day., Disp: 28 g, Rfl: 2    melatonin 5 MG tablet tablet, Take 1 tablet by mouth., Disp: , Rfl:     metoprolol succinate XL (TOPROL-XL) 50 MG 24 hr tablet, TAKE 1 TABLET EVERY DAY, Disp: 90 tablet, Rfl: 3    nabumetone (RELAFEN) 500 MG tablet, Take 1 tablet by  "mouth 2 (Two) Times a Day As Needed for Mild Pain., Disp: , Rfl:     polyethylene glycol (MIRALAX) 17 GM/SCOOP powder, Take 17 g by mouth Daily., Disp: 578 g, Rfl: 5    VITAMIN B COMPLEX-C PO, Take  by mouth., Disp: , Rfl:     amitriptyline (ELAVIL) 10 MG tablet, Take 1 tablet by mouth Every Night., Disp: 90 tablet, Rfl: 1    carBAMazepine XR (TEGretol  XR) 400 MG 12 hr tablet, Take 1 tablet by mouth 2 (Two) Times a Day., Disp: 180 tablet, Rfl: 1    losartan (COZAAR) 100 MG tablet, TAKE 1 TABLET EVERY DAY, Disp: 90 tablet, Rfl: 3    pregabalin (LYRICA) 50 MG capsule, Take 1 capsule by mouth 3 (Three) Times a Day., Disp: 270 capsule, Rfl: 1    Current Facility-Administered Medications:     albuterol (PROVENTIL) nebulizer solution 0.083% 2.5 mg/3mL, 2.5 mg, Nebulization, Q6H PRN, Eliel, Berta N, APRN, 2.5 mg at 11/20/18 1010    Allergies:  Allergies   Allergen Reactions    Ace Inhibitors Cough    Penicillins Other (See Comments)     Childhood allergy        Objective     Vital Signs:   Vitals:    02/14/25 1531   BP: 122/80   Pulse: 73   Resp: 16   SpO2: 96%   Weight: 51.3 kg (113 lb)   Height: 152.4 cm (60\")     Body mass index is 22.07 kg/m².    Physical Exam:    Physical Exam  Vitals and nursing note reviewed.   Constitutional:       Appearance: She is well-developed.   HENT:      Head: Normocephalic and atraumatic.   Eyes:      Pupils: Pupils are equal, round, and reactive to light.   Cardiovascular:      Rate and Rhythm: Normal rate and regular rhythm.      Heart sounds: Normal heart sounds.   Pulmonary:      Effort: Pulmonary effort is normal.      Breath sounds: Normal breath sounds.   Abdominal:      General: Bowel sounds are normal. There is no distension.      Palpations: Abdomen is soft.      Tenderness: There is no abdominal tenderness.   Musculoskeletal:      Cervical back: Neck supple.   Skin:     General: Skin is warm and dry.   Neurological:      General: No focal deficit present.      Mental Status: " She is alert and oriented to person, place, and time.   Psychiatric:         Mood and Affect: Mood normal.         Behavior: Behavior normal.         Assessment / Plan     Assessment/Plan:   Problem List Items Addressed This Visit       Anemia    Overview             Relevant Medications    folic acid (FOLVITE) 1 MG tablet    Mixed hyperlipidemia    Relevant Medications    atorvastatin (LIPITOR) 40 MG tablet    Hypertension    Relevant Medications    metoprolol succinate XL (TOPROL-XL) 50 MG 24 hr tablet    amLODIPine (NORVASC) 10 MG tablet    losartan (COZAAR) 100 MG tablet    Thalassemia    Relevant Medications    folic acid (FOLVITE) 1 MG tablet    Trigeminal neuralgia    Relevant Medications    amitriptyline (ELAVIL) 10 MG tablet    carBAMazepine XR (TEGretol  XR) 400 MG 12 hr tablet    pregabalin (LYRICA) 50 MG capsule    Moderate single current episode of major depressive disorder    Relevant Medications    escitalopram (LEXAPRO) 20 MG tablet    amitriptyline (ELAVIL) 10 MG tablet    Pre-diabetes - Primary    Relevant Orders    POC Glycosylated Hemoglobin (Hb A1C) (Completed)     Other Visit Diagnoses         Essential hypertension              --The current medical regimen is effective;  continue present plan and medications.      Discussed plan of care in detail with pt today; pt verb understanding and agrees.    Follow up:  3 months    Electronically signed by PETERSON Nguyen   02/14/2025 16:09 EST      Please note that portions of this note were completed with a voice recognition program.

## 2025-02-26 ENCOUNTER — OFFICE VISIT (OUTPATIENT)
Dept: NEUROLOGY | Facility: CLINIC | Age: 75
End: 2025-02-26
Payer: MEDICARE

## 2025-02-26 VITALS
HEART RATE: 81 BPM | BODY MASS INDEX: 21.6 KG/M2 | HEIGHT: 60 IN | SYSTOLIC BLOOD PRESSURE: 116 MMHG | WEIGHT: 110 LBS | DIASTOLIC BLOOD PRESSURE: 62 MMHG | OXYGEN SATURATION: 96 % | TEMPERATURE: 98.2 F

## 2025-02-26 DIAGNOSIS — G50.0 TRIGEMINAL NEURALGIA: ICD-10-CM

## 2025-02-26 RX ORDER — CARBAMAZEPINE 400 MG/1
400 TABLET, EXTENDED RELEASE ORAL 2 TIMES DAILY
Qty: 180 TABLET | Refills: 1 | Status: SHIPPED | OUTPATIENT
Start: 2025-02-26

## 2025-02-26 RX ORDER — PREGABALIN 50 MG/1
50 CAPSULE ORAL 3 TIMES DAILY
Qty: 270 CAPSULE | Refills: 1 | Status: SHIPPED | OUTPATIENT
Start: 2025-02-26

## 2025-02-26 RX ORDER — AMITRIPTYLINE HYDROCHLORIDE 10 MG/1
10 TABLET ORAL NIGHTLY
Qty: 90 TABLET | Refills: 1 | Status: SHIPPED | OUTPATIENT
Start: 2025-02-26

## 2025-02-26 NOTE — PROGRESS NOTES
Follow Up Office Visit      Patient Name: Antonio Barnes  : 1950   MRN: [id number]     Chief Complaint:    Chief Complaint   Patient presents with    Follow-up     Trigeminal neuralgia        History of Present Illness: Antonio Barnes is a 74 y.o. female who is here today to follow up with trigeminal neuralgia.  She notes that she has less right sided facial pain with her current regimen.  She says that she was unable to stop her amitriptyline as she has recurrent right facial pain when she is not on the amitriptyline but she will decrease to 1 tablet instead of 1-1/2 tablets that she says she was taking prior.  We discussed side effects of amitriptyline and patient verbalizes understanding but says it keeps her pain at bay.    Her daughter lives with her.        - Past medical history: Anemia, thalassemia, mixed hyperlipidemia, vitamin D deficiency, hypertension    Subjective      Review of Systems:   Review of Systems   Neurological:         Right facial pain       I have reviewed and the following portions of the patient's history were updated as appropriate: past family history, past medical history, past social history, past surgical history and problem list.    Medications:     Current Outpatient Medications:     amitriptyline (ELAVIL) 10 MG tablet, Take 1 tablet by mouth Every Night., Disp: 90 tablet, Rfl: 1    amLODIPine (NORVASC) 10 MG tablet, TAKE 1 TABLET EVERY DAY AS DIRECTED, Disp: 90 tablet, Rfl: 3    atorvastatin (LIPITOR) 40 MG tablet, TAKE 1 TABLET EVERY NIGHT, Disp: 90 tablet, Rfl: 3    calcium-vitamin D (OSCAL-500) 500-200 MG-UNIT per tablet, Take 1 tablet by mouth 2 (Two) Times a Day., Disp: , Rfl:     carBAMazepine XR (TEGretol  XR) 400 MG 12 hr tablet, Take 1 tablet by mouth 2 (Two) Times a Day., Disp: 180 tablet, Rfl: 1    Cholecalciferol (VITAMIN D3) 1000 UNITS capsule, Take 1 capsule by mouth Daily., Disp: , Rfl:     escitalopram (LEXAPRO) 20 MG tablet, TAKE 1 TABLET EVERY  "DAY, Disp: 90 tablet, Rfl: 3    folic acid (FOLVITE) 1 MG tablet, Take 1 tablet by mouth Daily., Disp: 90 tablet, Rfl: 3    Hydrocortisone, Perianal, (Proctozone-HC) 2.5 % rectal cream, Insert  into the rectum 2 (Two) Times a Day., Disp: 28 g, Rfl: 2    losartan (COZAAR) 100 MG tablet, TAKE 1 TABLET EVERY DAY, Disp: 90 tablet, Rfl: 3    melatonin 5 MG tablet tablet, Take 1 tablet by mouth., Disp: , Rfl:     metoprolol succinate XL (TOPROL-XL) 50 MG 24 hr tablet, TAKE 1 TABLET EVERY DAY, Disp: 90 tablet, Rfl: 3    nabumetone (RELAFEN) 500 MG tablet, Take 1 tablet by mouth 2 (Two) Times a Day As Needed for Mild Pain., Disp: , Rfl:     polyethylene glycol (MIRALAX) 17 GM/SCOOP powder, Take 17 g by mouth Daily., Disp: 578 g, Rfl: 5    pregabalin (LYRICA) 50 MG capsule, Take 1 capsule by mouth 3 (Three) Times a Day., Disp: 270 capsule, Rfl: 1    VITAMIN B COMPLEX-C PO, Take  by mouth., Disp: , Rfl:     Current Facility-Administered Medications:     albuterol (PROVENTIL) nebulizer solution 0.083% 2.5 mg/3mL, 2.5 mg, Nebulization, Q6H PRN, Berta Julian N, APRN, 2.5 mg at 11/20/18 1010    Allergies:   Allergies   Allergen Reactions    Ace Inhibitors Cough    Penicillins Other (See Comments)     Childhood allergy        Objective     Physical Exam:  Vital Signs:   Vitals:    02/26/25 1019   BP: 116/62   Pulse: 81   Temp: 98.2 °F (36.8 °C)   SpO2: 96%   Weight: 49.9 kg (110 lb)   Height: 152.4 cm (60\")   PainSc: 0-No pain     Body mass index is 21.48 kg/m².    Physical Exam  Constitutional:       General: She is awake.      Appearance: Normal appearance.   HENT:      Head: Normocephalic.   Eyes:      General: Lids are normal.      Extraocular Movements: Extraocular movements intact.      Conjunctiva/sclera: Conjunctivae normal.   Pulmonary:      Effort: Pulmonary effort is normal.   Musculoskeletal:         General: Normal range of motion.   Skin:     General: Skin is warm and dry.   Neurological:      General: No focal " deficit present.      Mental Status: She is alert and oriented to person, place, and time.      Cranial Nerves: Cranial nerves 2-12 are intact. No dysarthria.      Motor: Motor strength is normal.Motor function is intact. No tremor.      Coordination: Coordination is intact. Finger-Nose-Finger Test normal.      Deep Tendon Reflexes:      Reflex Scores:       Patellar reflexes are 2+ on the left side.  Psychiatric:         Attention and Perception: Attention normal.         Mood and Affect: Mood and affect normal.         Speech: Speech normal.         Behavior: Behavior normal. Behavior is cooperative.         Thought Content: Thought content normal.         Cognition and Memory: Cognition normal.         Judgment: Judgment normal.         Neurological Exam  Mental Status  Awake and alert. Oriented to person, place, time and situation. Oriented to person, place, and time. Speech is normal. no dysarthria present. Language is fluent with no aphasia. Attention and concentration are normal.    Cranial Nerves  CN III, IV, VI: Extraocular movements intact bilaterally. Normal lids and orbits bilaterally.  CN V: Facial sensation is normal.  CN VII: Full and symmetric facial movement.  CN IX, X: Palate elevates symmetrically  CN XI: Shoulder shrug strength is normal.  CN XII: Tongue midline without atrophy or fasciculations.    Motor  Normal muscle bulk throughout. No fasciculations present. Normal muscle tone. No abnormal involuntary movements. Strength is 5/5 throughout all four extremities.    Reflexes                                            Right                      Left  Patellar                                                        2+    Coordination    Finger-to-nose, rapid alternating movements and heel-to-shin normal bilaterally without dysmetria.No tremor    Gait  Casual gait is normal including stance, stride, and arm swing.      Assessment / Plan      Assessment/Plan:   Diagnoses and all orders for this  visit:    1. Trigeminal neuralgia  -     amitriptyline (ELAVIL) 10 MG tablet; Take 1 tablet by mouth Every Night.  Dispense: 90 tablet; Refill: 1  -     carBAMazepine XR (TEGretol  XR) 400 MG 12 hr tablet; Take 1 tablet by mouth 2 (Two) Times a Day.  Dispense: 180 tablet; Refill: 1  -     pregabalin (LYRICA) 50 MG capsule; Take 1 capsule by mouth 3 (Three) Times a Day.  Dispense: 270 capsule; Refill: 1           This is a pleasant 74-year-old female patient here to follow-up with trigeminal neuralgia on the right.  She is happy with her Lyrica 50 mg 3 times daily, carbamazepine 400 mg twice daily and amitriptyline 10 mg at at bedtime.  She notes that she was taking 15 mg at at bedtime but she has been instructed to cut back to 10 mg.  She says she has tried to go without it as she discussed with her PCP but says she is unable due to the pain level when she is not on this.  Indications and side effects discussed with patient she verbalizes understanding.  Patient will call in the interim if she has any questions or concerns or changes.    Follow Up:   Return in about 6 months (around 8/26/2025).    PETERSON Chaudhari, FNP-[facility]

## 2025-03-05 DIAGNOSIS — I10 PRIMARY HYPERTENSION: ICD-10-CM

## 2025-03-05 RX ORDER — LOSARTAN POTASSIUM 100 MG/1
100 TABLET ORAL DAILY
Qty: 90 TABLET | Refills: 3 | Status: SHIPPED | OUTPATIENT
Start: 2025-03-05

## 2025-03-17 ENCOUNTER — HOSPITAL ENCOUNTER (OUTPATIENT)
Dept: MAMMOGRAPHY | Facility: HOSPITAL | Age: 75
Discharge: HOME | End: 2025-03-17
Admitting: NURSE PRACTITIONER
Payer: MEDICARE

## 2025-03-17 DIAGNOSIS — Z12.31 VISIT FOR SCREENING MAMMOGRAM: ICD-10-CM

## 2025-03-17 PROCEDURE — 77063 BREAST TOMOSYNTHESIS BI: CPT

## 2025-03-17 PROCEDURE — 77067 SCR MAMMO BI INCL CAD: CPT

## 2025-05-19 ENCOUNTER — OFFICE VISIT (OUTPATIENT)
Dept: FAMILY MEDICINE CLINIC | Facility: CLINIC | Age: 75
End: 2025-05-19
Payer: MEDICARE

## 2025-05-19 VITALS
SYSTOLIC BLOOD PRESSURE: 130 MMHG | RESPIRATION RATE: 16 BRPM | HEIGHT: 60 IN | BODY MASS INDEX: 20.97 KG/M2 | DIASTOLIC BLOOD PRESSURE: 78 MMHG | WEIGHT: 106.8 LBS | HEART RATE: 69 BPM | OXYGEN SATURATION: 97 %

## 2025-05-19 DIAGNOSIS — D56.8 OTHER THALASSEMIA: ICD-10-CM

## 2025-05-19 DIAGNOSIS — I10 ESSENTIAL HYPERTENSION: ICD-10-CM

## 2025-05-19 DIAGNOSIS — E78.2 MIXED HYPERLIPIDEMIA: ICD-10-CM

## 2025-05-19 DIAGNOSIS — R73.03 PRE-DIABETES: ICD-10-CM

## 2025-05-19 DIAGNOSIS — E55.9 VITAMIN D DEFICIENCY: ICD-10-CM

## 2025-05-19 DIAGNOSIS — F41.1 GAD (GENERALIZED ANXIETY DISORDER): ICD-10-CM

## 2025-05-19 DIAGNOSIS — G50.0 TRIGEMINAL NEURALGIA: ICD-10-CM

## 2025-05-19 DIAGNOSIS — I10 PRIMARY HYPERTENSION: Primary | ICD-10-CM

## 2025-05-19 DIAGNOSIS — F32.1 MODERATE SINGLE CURRENT EPISODE OF MAJOR DEPRESSIVE DISORDER: ICD-10-CM

## 2025-05-19 PROCEDURE — 3078F DIAST BP <80 MM HG: CPT | Performed by: NURSE PRACTITIONER

## 2025-05-19 PROCEDURE — G2211 COMPLEX E/M VISIT ADD ON: HCPCS | Performed by: NURSE PRACTITIONER

## 2025-05-19 PROCEDURE — 1159F MED LIST DOCD IN RCRD: CPT | Performed by: NURSE PRACTITIONER

## 2025-05-19 PROCEDURE — 3075F SYST BP GE 130 - 139MM HG: CPT | Performed by: NURSE PRACTITIONER

## 2025-05-19 PROCEDURE — 99214 OFFICE O/P EST MOD 30 MIN: CPT | Performed by: NURSE PRACTITIONER

## 2025-05-19 PROCEDURE — 1160F RVW MEDS BY RX/DR IN RCRD: CPT | Performed by: NURSE PRACTITIONER

## 2025-05-19 PROCEDURE — 1126F AMNT PAIN NOTED NONE PRSNT: CPT | Performed by: NURSE PRACTITIONER

## 2025-05-19 RX ORDER — METOPROLOL SUCCINATE 100 MG/1
100 TABLET, EXTENDED RELEASE ORAL DAILY
Qty: 90 TABLET | Refills: 1 | Status: SHIPPED | OUTPATIENT
Start: 2025-05-19

## 2025-05-19 RX ORDER — ALPRAZOLAM 0.25 MG
TABLET ORAL
Qty: 12 TABLET | Refills: 1 | Status: SHIPPED | OUTPATIENT
Start: 2025-05-19

## 2025-05-19 NOTE — PROGRESS NOTES
"      Subjective     Chief Complaint:    Chief Complaint   Patient presents with    Hypertension     Pt states her blood pressure has been high in the evenings.        History of Present Illness:   Pt presents with recent hypertension happening at night.   Pt taking SBP at home and states that it runs anywhere from 180-200.   Ears begin burning and turning red.   Taking medications as prescribed.   Norvasc is taken in the morning, metoprolol is taken in the afternoon.   No c/o chest pain. States that she knows that her BP is high and can tell because she doesn't feel well.   No other associated symptoms.   Depression and anxiety are under control, states that it comes and goes r/t husbands passing.   Trigeminal neuralgia is doing better, without pain but c/o numbness and her mouth feeling, \"thick.\" - Managed by neurology.       Review of Systems   Eyes:  Negative for blurred vision and visual disturbance.   Respiratory:  Negative for chest tightness and shortness of breath.    Neurological:  Positive for numbness.        Facial numbness r/t trigeminal neuralgia   Psychiatric/Behavioral:  Positive for stress.      Gen- No fevers, chills  CV- No chest pain, palpitations  Resp- No cough, dyspnea  GI- No N/V/D, abd pain  Neuro-No dizziness, headaches      I have reviewed and/or updated the patient's past medical, surgical, family, social history and problem list as appropriate.     Medications:    Current Outpatient Medications:     amitriptyline (ELAVIL) 10 MG tablet, Take 1 tablet by mouth Every Night., Disp: 90 tablet, Rfl: 1    amLODIPine (NORVASC) 10 MG tablet, TAKE 1 TABLET EVERY DAY AS DIRECTED, Disp: 90 tablet, Rfl: 3    atorvastatin (LIPITOR) 40 MG tablet, TAKE 1 TABLET EVERY NIGHT, Disp: 90 tablet, Rfl: 3    calcium-vitamin D (OSCAL-500) 500-200 MG-UNIT per tablet, Take 1 tablet by mouth 2 (Two) Times a Day., Disp: , Rfl:     carBAMazepine XR (TEGretol  XR) 400 MG 12 hr tablet, Take 1 tablet by mouth 2 (Two) " "Times a Day., Disp: 180 tablet, Rfl: 1    Cholecalciferol (VITAMIN D3) 1000 UNITS capsule, Take 1 capsule by mouth Daily., Disp: , Rfl:     escitalopram (LEXAPRO) 20 MG tablet, TAKE 1 TABLET EVERY DAY, Disp: 90 tablet, Rfl: 3    Hydrocortisone, Perianal, (Proctozone-HC) 2.5 % rectal cream, Insert  into the rectum 2 (Two) Times a Day., Disp: 28 g, Rfl: 2    losartan (COZAAR) 100 MG tablet, TAKE 1 TABLET EVERY DAY, Disp: 90 tablet, Rfl: 3    melatonin 5 MG tablet tablet, Take 1 tablet by mouth., Disp: , Rfl:     metoprolol succinate XL (TOPROL-XL) 100 MG 24 hr tablet, Take 1 tablet by mouth Daily., Disp: 90 tablet, Rfl: 1    nabumetone (RELAFEN) 500 MG tablet, Take 1 tablet by mouth 2 (Two) Times a Day As Needed for Mild Pain., Disp: , Rfl:     polyethylene glycol (MIRALAX) 17 GM/SCOOP powder, Take 17 g by mouth Daily., Disp: 578 g, Rfl: 5    pregabalin (LYRICA) 50 MG capsule, Take 1 capsule by mouth 3 (Three) Times a Day., Disp: 270 capsule, Rfl: 1    VITAMIN B COMPLEX-C PO, Take  by mouth., Disp: , Rfl:     ALPRAZolam (Xanax) 0.25 MG tablet, 1/2-1 po daily as needed for breakthrough anxiety, Disp: 12 tablet, Rfl: 1    folic acid (FOLVITE) 1 MG tablet, Take 1 tablet by mouth Daily. (Patient not taking: Reported on 5/19/2025), Disp: 90 tablet, Rfl: 3    Current Facility-Administered Medications:     albuterol (PROVENTIL) nebulizer solution 0.083% 2.5 mg/3mL, 2.5 mg, Nebulization, Q6H PRN, Berta Julian, APRN, 2.5 mg at 11/20/18 1010    Allergies:  Allergies   Allergen Reactions    Ace Inhibitors Cough    Penicillins Other (See Comments)     Childhood allergy        Objective     Vital Signs:   Vitals:    05/19/25 1017   BP: 130/78   Pulse: 69   Resp: 16   SpO2: 97%   Weight: 48.4 kg (106 lb 12.8 oz)   Height: 152.4 cm (60\")     Body mass index is 20.86 kg/m².    Physical Exam:    Physical Exam  Constitutional:       Appearance: Normal appearance. She is normal weight.   HENT:      Head: Normocephalic and atraumatic. "      Right Ear: Tympanic membrane normal.      Left Ear: Tympanic membrane normal.      Mouth/Throat:      Mouth: Mucous membranes are moist.   Eyes:      Pupils: Pupils are equal, round, and reactive to light.   Neck:      Vascular: No carotid bruit.   Cardiovascular:      Rate and Rhythm: Normal rate and regular rhythm.      Pulses: Normal pulses.      Heart sounds: Normal heart sounds.   Pulmonary:      Effort: Pulmonary effort is normal.      Breath sounds: Normal breath sounds.   Abdominal:      General: Abdomen is flat. Bowel sounds are normal.      Palpations: Abdomen is soft.   Skin:     General: Skin is warm and dry.   Neurological:      General: No focal deficit present.      Mental Status: She is alert and oriented to person, place, and time. Mental status is at baseline.   Psychiatric:         Mood and Affect: Mood normal.         Behavior: Behavior normal.         Assessment / Plan     Assessment/Plan:   Problem List Items Addressed This Visit       Mixed hyperlipidemia    Relevant Medications    atorvastatin (LIPITOR) 40 MG tablet    Hypertension - Primary    Relevant Medications    amLODIPine (NORVASC) 10 MG tablet    losartan (COZAAR) 100 MG tablet    metoprolol succinate XL (TOPROL-XL) 100 MG 24 hr tablet    Thalassemia    Relevant Medications    folic acid (FOLVITE) 1 MG tablet    Trigeminal neuralgia    Relevant Medications    amitriptyline (ELAVIL) 10 MG tablet    carBAMazepine XR (TEGretol  XR) 400 MG 12 hr tablet    pregabalin (LYRICA) 50 MG capsule    Vitamin D deficiency    Moderate single current episode of major depressive disorder    Relevant Medications    escitalopram (LEXAPRO) 20 MG tablet    amitriptyline (ELAVIL) 10 MG tablet    ALPRAZolam (Xanax) 0.25 MG tablet    Pre-diabetes    Relevant Orders    Hemoglobin A1c (Completed)     Other Visit Diagnoses         Essential hypertension        Relevant Medications    metoprolol succinate XL (TOPROL-XL) 100 MG 24 hr tablet    Other Relevant  Orders    Comprehensive Metabolic Panel (Completed)    CBC Auto Differential    Lipid Panel (Completed)      FOREST (generalized anxiety disorder)        Relevant Medications    ALPRAZolam (Xanax) 0.25 MG tablet          --Trial xanax PRN for anxiety happening at night, has tried and failed multiple other medications  --Follow labs  --Patient to see neurology for trigeminal neuralgia symptom control  --Increase metoprolol and continue to monitor blood pressure at home.       Discussed plan of care in detail with pt today; pt verb understanding and agrees.    Follow up:  1 month    Electronically signed by PETERSON Nguyen   05/19/2025 10:22 EDT      Please note that portions of this note were completed with a voice recognition program.   I, Ritu WINKLER, personally performed the services described in this documentation, as scribed by Jasmine Griffith, PETERSON Student in my presence, and is both accurate and complete

## 2025-05-20 LAB
ALBUMIN SERPL-MCNC: 4.4 G/DL (ref 3.5–5.2)
ALBUMIN/GLOB SERPL: 1.7 G/DL
ALP SERPL-CCNC: 106 U/L (ref 39–117)
ALT SERPL-CCNC: 16 U/L (ref 1–33)
AST SERPL-CCNC: 26 U/L (ref 1–32)
BASOPHILS # BLD AUTO: 0.03 10*3/MM3 (ref 0–0.2)
BASOPHILS NFR BLD AUTO: 0.5 % (ref 0–1.5)
BILIRUB SERPL-MCNC: 0.4 MG/DL (ref 0–1.2)
BUN SERPL-MCNC: 7 MG/DL (ref 8–23)
BUN/CREAT SERPL: 10 (ref 7–25)
CALCIUM SERPL-MCNC: 9.2 MG/DL (ref 8.6–10.5)
CHLORIDE SERPL-SCNC: 95 MMOL/L (ref 98–107)
CHOLEST SERPL-MCNC: 237 MG/DL (ref 0–200)
CO2 SERPL-SCNC: 26.2 MMOL/L (ref 22–29)
CREAT SERPL-MCNC: 0.7 MG/DL (ref 0.57–1)
EGFRCR SERPLBLD CKD-EPI 2021: 90.3 ML/MIN/1.73
EOSINOPHIL # BLD AUTO: 0.13 10*3/MM3 (ref 0–0.4)
EOSINOPHIL NFR BLD AUTO: 2.1 % (ref 0.3–6.2)
ERYTHROCYTE [DISTWIDTH] IN BLOOD BY AUTOMATED COUNT: 16.1 % (ref 12.3–15.4)
GLOBULIN SER CALC-MCNC: 2.6 GM/DL
GLUCOSE SERPL-MCNC: 97 MG/DL (ref 65–99)
HBA1C MFR BLD: 5.9 % (ref 4.8–5.6)
HCT VFR BLD AUTO: 38.5 % (ref 34–46.6)
HDLC SERPL-MCNC: 66 MG/DL (ref 40–60)
HGB BLD-MCNC: 11.7 G/DL (ref 12–15.9)
IMM GRANULOCYTES # BLD AUTO: 0.02 10*3/MM3 (ref 0–0.05)
IMM GRANULOCYTES NFR BLD AUTO: 0.3 % (ref 0–0.5)
LDLC SERPL CALC-MCNC: 145 MG/DL (ref 0–100)
LYMPHOCYTES # BLD AUTO: 2.09 10*3/MM3 (ref 0.7–3.1)
LYMPHOCYTES NFR BLD AUTO: 33 % (ref 19.6–45.3)
MCH RBC QN AUTO: 22.9 PG (ref 26.6–33)
MCHC RBC AUTO-ENTMCNC: 30.4 G/DL (ref 31.5–35.7)
MCV RBC AUTO: 75.3 FL (ref 79–97)
MONOCYTES # BLD AUTO: 0.47 10*3/MM3 (ref 0.1–0.9)
MONOCYTES NFR BLD AUTO: 7.4 % (ref 5–12)
NEUTROPHILS # BLD AUTO: 3.59 10*3/MM3 (ref 1.7–7)
NEUTROPHILS NFR BLD AUTO: 56.7 % (ref 42.7–76)
PLATELET # BLD AUTO: 314 10*3/MM3 (ref 140–450)
POTASSIUM SERPL-SCNC: 4.5 MMOL/L (ref 3.5–5.2)
PROT SERPL-MCNC: 7 G/DL (ref 6–8.5)
RBC # BLD AUTO: 5.11 10*6/MM3 (ref 3.77–5.28)
SODIUM SERPL-SCNC: 134 MMOL/L (ref 136–145)
TRIGL SERPL-MCNC: 148 MG/DL (ref 0–150)
VLDLC SERPL CALC-MCNC: 26 MG/DL (ref 5–40)
WBC # BLD AUTO: 6.33 10*3/MM3 (ref 3.4–10.8)

## 2025-06-16 ENCOUNTER — OFFICE VISIT (OUTPATIENT)
Dept: FAMILY MEDICINE CLINIC | Facility: CLINIC | Age: 75
End: 2025-06-16
Payer: MEDICARE

## 2025-06-16 VITALS
HEIGHT: 60 IN | OXYGEN SATURATION: 97 % | RESPIRATION RATE: 14 BRPM | HEART RATE: 64 BPM | SYSTOLIC BLOOD PRESSURE: 118 MMHG | DIASTOLIC BLOOD PRESSURE: 64 MMHG | WEIGHT: 110.4 LBS | BODY MASS INDEX: 21.68 KG/M2

## 2025-06-16 DIAGNOSIS — I10 PRIMARY HYPERTENSION: ICD-10-CM

## 2025-06-16 DIAGNOSIS — F32.1 MODERATE SINGLE CURRENT EPISODE OF MAJOR DEPRESSIVE DISORDER: Primary | ICD-10-CM

## 2025-06-16 PROCEDURE — 1160F RVW MEDS BY RX/DR IN RCRD: CPT | Performed by: NURSE PRACTITIONER

## 2025-06-16 PROCEDURE — G2211 COMPLEX E/M VISIT ADD ON: HCPCS | Performed by: NURSE PRACTITIONER

## 2025-06-16 PROCEDURE — 1126F AMNT PAIN NOTED NONE PRSNT: CPT | Performed by: NURSE PRACTITIONER

## 2025-06-16 PROCEDURE — 3078F DIAST BP <80 MM HG: CPT | Performed by: NURSE PRACTITIONER

## 2025-06-16 PROCEDURE — 1159F MED LIST DOCD IN RCRD: CPT | Performed by: NURSE PRACTITIONER

## 2025-06-16 PROCEDURE — 3074F SYST BP LT 130 MM HG: CPT | Performed by: NURSE PRACTITIONER

## 2025-06-16 PROCEDURE — 99214 OFFICE O/P EST MOD 30 MIN: CPT | Performed by: NURSE PRACTITIONER

## 2025-06-16 RX ORDER — BUPROPION HYDROCHLORIDE 100 MG/1
100 TABLET, EXTENDED RELEASE ORAL EVERY MORNING
Qty: 90 TABLET | Refills: 0 | Status: SHIPPED | OUTPATIENT
Start: 2025-06-16

## 2025-06-16 NOTE — PROGRESS NOTES
Subjective     Chief Complaint:    Chief Complaint   Patient presents with    Hypertension       History of Present Illness:   4 week f/u anxiety and HTN  Metoprolol was increased, tolerating, no longer having elevated readings in the evening  Anxiety, prn xanax was prescribed, she has used once and tolerated, no sedation, helped her feel calm and less restless  Notes persistent depression, lack of motivation, social isolation, saddness, etc. No SI/HI, on lexapro, has tried and failed zoloft, pristiq, cymbalta      Review of Systems  Gen- No fevers, chills  CV- No chest pain, palpitations  Resp- No cough, dyspnea  GI- No N/V/D, abd pain  Neuro-No dizziness, headaches      I have reviewed and/or updated the patient's past medical, surgical, family, social history and problem list as appropriate.     Medications:    Current Outpatient Medications:     ALPRAZolam (Xanax) 0.25 MG tablet, 1/2-1 po daily as needed for breakthrough anxiety, Disp: 12 tablet, Rfl: 1    amitriptyline (ELAVIL) 10 MG tablet, Take 1 tablet by mouth Every Night., Disp: 90 tablet, Rfl: 1    amLODIPine (NORVASC) 10 MG tablet, TAKE 1 TABLET EVERY DAY AS DIRECTED, Disp: 90 tablet, Rfl: 3    atorvastatin (LIPITOR) 40 MG tablet, TAKE 1 TABLET EVERY NIGHT, Disp: 90 tablet, Rfl: 3    calcium-vitamin D (OSCAL-500) 500-200 MG-UNIT per tablet, Take 1 tablet by mouth 2 (Two) Times a Day., Disp: , Rfl:     carBAMazepine XR (TEGretol  XR) 400 MG 12 hr tablet, Take 1 tablet by mouth 2 (Two) Times a Day., Disp: 180 tablet, Rfl: 1    Cholecalciferol (VITAMIN D3) 1000 UNITS capsule, Take 1 capsule by mouth Daily., Disp: , Rfl:     escitalopram (LEXAPRO) 20 MG tablet, TAKE 1 TABLET EVERY DAY, Disp: 90 tablet, Rfl: 3    folic acid (FOLVITE) 1 MG tablet, Take 1 tablet by mouth Daily., Disp: 90 tablet, Rfl: 3    Hydrocortisone, Perianal, (Proctozone-HC) 2.5 % rectal cream, Insert  into the rectum 2 (Two) Times a Day., Disp: 28 g, Rfl: 2    losartan (COZAAR) 100  "MG tablet, TAKE 1 TABLET EVERY DAY, Disp: 90 tablet, Rfl: 3    melatonin 5 MG tablet tablet, Take 1 tablet by mouth., Disp: , Rfl:     metoprolol succinate XL (TOPROL-XL) 100 MG 24 hr tablet, Take 1 tablet by mouth Daily., Disp: 90 tablet, Rfl: 1    nabumetone (RELAFEN) 500 MG tablet, Take 1 tablet by mouth 2 (Two) Times a Day As Needed for Mild Pain., Disp: , Rfl:     polyethylene glycol (MIRALAX) 17 GM/SCOOP powder, Take 17 g by mouth Daily., Disp: 578 g, Rfl: 5    pregabalin (LYRICA) 50 MG capsule, Take 1 capsule by mouth 3 (Three) Times a Day., Disp: 270 capsule, Rfl: 1    VITAMIN B COMPLEX-C PO, Take  by mouth., Disp: , Rfl:     buPROPion SR (Wellbutrin SR) 100 MG 12 hr tablet, Take 1 tablet by mouth Every Morning., Disp: 90 tablet, Rfl: 0    Current Facility-Administered Medications:     albuterol (PROVENTIL) nebulizer solution 0.083% 2.5 mg/3mL, 2.5 mg, Nebulization, Q6H PRN, Halliday, Berta N, APRN, 2.5 mg at 11/20/18 1010    Allergies:  Allergies   Allergen Reactions    Ace Inhibitors Cough    Penicillins Other (See Comments)     Childhood allergy        Objective     Vital Signs:   Vitals:    06/16/25 1540   BP: 118/64   Pulse: 64   Resp: 14   SpO2: 97%   Weight: 50.1 kg (110 lb 6.4 oz)   Height: 152.4 cm (60\")     Body mass index is 21.56 kg/m².    Physical Exam:    Physical Exam  HENT:      Head: Normocephalic and atraumatic.      Nose: Nose normal.   Eyes:      Pupils: Pupils are equal, round, and reactive to light.   Cardiovascular:      Rate and Rhythm: Normal rate.   Pulmonary:      Effort: Pulmonary effort is normal.   Neurological:      General: No focal deficit present.      Mental Status: She is alert and oriented to person, place, and time.   Psychiatric:         Mood and Affect: Mood normal.         Behavior: Behavior normal.         Assessment / Plan     Assessment/Plan:   Problem List Items Addressed This Visit       Hypertension    Relevant Medications    amLODIPine (NORVASC) 10 MG tablet    " losartan (COZAAR) 100 MG tablet    metoprolol succinate XL (TOPROL-XL) 100 MG 24 hr tablet    Moderate single current episode of major depressive disorder - Primary    Relevant Medications    escitalopram (LEXAPRO) 20 MG tablet    amitriptyline (ELAVIL) 10 MG tablet    ALPRAZolam (Xanax) 0.25 MG tablet    buPROPion SR (Wellbutrin SR) 100 MG 12 hr tablet     -- BP better  -- add wellbutrin, continue lexapro, ok to continue prn xanax    Discussed plan of care in detail with pt today; pt verb understanding and agrees.    Follow up:  4 weeks    Electronically signed by PETERSON Nguyen   06/16/2025 16:01 EDT      Please note that portions of this note were completed with a voice recognition program.

## 2025-07-29 ENCOUNTER — OFFICE VISIT (OUTPATIENT)
Dept: FAMILY MEDICINE CLINIC | Facility: CLINIC | Age: 75
End: 2025-07-29
Payer: MEDICARE

## 2025-07-29 VITALS
WEIGHT: 108.6 LBS | HEIGHT: 60 IN | OXYGEN SATURATION: 97 % | RESPIRATION RATE: 16 BRPM | SYSTOLIC BLOOD PRESSURE: 130 MMHG | DIASTOLIC BLOOD PRESSURE: 78 MMHG | HEART RATE: 66 BPM | BODY MASS INDEX: 21.32 KG/M2

## 2025-07-29 DIAGNOSIS — F32.1 MODERATE SINGLE CURRENT EPISODE OF MAJOR DEPRESSIVE DISORDER: ICD-10-CM

## 2025-07-29 PROCEDURE — 1159F MED LIST DOCD IN RCRD: CPT | Performed by: NURSE PRACTITIONER

## 2025-07-29 PROCEDURE — 3078F DIAST BP <80 MM HG: CPT | Performed by: NURSE PRACTITIONER

## 2025-07-29 PROCEDURE — 1160F RVW MEDS BY RX/DR IN RCRD: CPT | Performed by: NURSE PRACTITIONER

## 2025-07-29 PROCEDURE — 99213 OFFICE O/P EST LOW 20 MIN: CPT | Performed by: NURSE PRACTITIONER

## 2025-07-29 PROCEDURE — 1126F AMNT PAIN NOTED NONE PRSNT: CPT | Performed by: NURSE PRACTITIONER

## 2025-07-29 PROCEDURE — 3075F SYST BP GE 130 - 139MM HG: CPT | Performed by: NURSE PRACTITIONER

## 2025-07-29 RX ORDER — BUPROPION HYDROCHLORIDE 150 MG/1
150 TABLET, EXTENDED RELEASE ORAL EVERY MORNING
Qty: 90 TABLET | Refills: 1 | Status: SHIPPED | OUTPATIENT
Start: 2025-07-29

## 2025-07-29 NOTE — PROGRESS NOTES
Subjective     Chief Complaint:    Chief Complaint   Patient presents with    Depression    Hypertension       History of Present Illness  The patient is a 74-year-old female presenting for a 4-week follow-up on depression. Started on Wellbutrin 4 weeks ago due to persistent depression, lack of motivation, social isolation, and sadness. Previously tried and failed Zoloft, Pristiq, and Cymbalta. Currently on Lexapro.    Reports initial improvement after 2 weeks on Wellbutrin but recently noticed a slight return of depressive symptoms. Engaged in activities like dining out and gardening, which she typically avoids during depressive episodes. Notes decreased interest in cooking, cleaning, and playing with her cat. Adhering to medication regimen, including escitalopram, with no side effects. Sleep pattern remains normal    Sleep: Reports sleeping well at night.       Review of Systems  Gen- No fevers, chills  CV- No chest pain, palpitations  Resp- No cough, dyspnea  GI- No N/V/D, abd pain  Neuro-No dizziness, headaches      I have reviewed and/or updated the patient's past medical, surgical, family, social history and problem list as appropriate.     Medications:    Current Outpatient Medications:     ALPRAZolam (Xanax) 0.25 MG tablet, 1/2-1 po daily as needed for breakthrough anxiety, Disp: 12 tablet, Rfl: 1    amitriptyline (ELAVIL) 10 MG tablet, Take 1 tablet by mouth Every Night., Disp: 90 tablet, Rfl: 1    amLODIPine (NORVASC) 10 MG tablet, TAKE 1 TABLET EVERY DAY AS DIRECTED, Disp: 90 tablet, Rfl: 3    atorvastatin (LIPITOR) 40 MG tablet, TAKE 1 TABLET EVERY NIGHT, Disp: 90 tablet, Rfl: 3    buPROPion SR (Wellbutrin SR) 150 MG 12 hr tablet, Take 1 tablet by mouth Every Morning., Disp: 90 tablet, Rfl: 1    calcium-vitamin D (OSCAL-500) 500-200 MG-UNIT per tablet, Take 1 tablet by mouth 2 (Two) Times a Day., Disp: , Rfl:     carBAMazepine XR (TEGretol  XR) 400 MG 12 hr tablet, Take 1 tablet by mouth 2 (Two)  "Times a Day., Disp: 180 tablet, Rfl: 1    Cholecalciferol (VITAMIN D3) 1000 UNITS capsule, Take 1 capsule by mouth Daily., Disp: , Rfl:     folic acid (FOLVITE) 1 MG tablet, Take 1 tablet by mouth Daily., Disp: 90 tablet, Rfl: 3    Hydrocortisone, Perianal, (Proctozone-HC) 2.5 % rectal cream, Insert  into the rectum 2 (Two) Times a Day., Disp: 28 g, Rfl: 2    losartan (COZAAR) 100 MG tablet, TAKE 1 TABLET EVERY DAY, Disp: 90 tablet, Rfl: 3    melatonin 5 MG tablet tablet, Take 1 tablet by mouth., Disp: , Rfl:     metoprolol succinate XL (TOPROL-XL) 100 MG 24 hr tablet, Take 1 tablet by mouth Daily., Disp: 90 tablet, Rfl: 1    nabumetone (RELAFEN) 500 MG tablet, Take 1 tablet by mouth 2 (Two) Times a Day As Needed for Mild Pain., Disp: , Rfl:     pregabalin (LYRICA) 50 MG capsule, Take 1 capsule by mouth 3 (Three) Times a Day., Disp: 270 capsule, Rfl: 1    VITAMIN B COMPLEX-C PO, Take  by mouth., Disp: , Rfl:     escitalopram (LEXAPRO) 20 MG tablet, TAKE 1 TABLET EVERY DAY (Patient not taking: Reported on 7/29/2025), Disp: 90 tablet, Rfl: 3    polyethylene glycol (MIRALAX) 17 GM/SCOOP powder, Take 17 g by mouth Daily. (Patient not taking: Reported on 7/29/2025), Disp: 578 g, Rfl: 5    Current Facility-Administered Medications:     albuterol (PROVENTIL) nebulizer solution 0.083% 2.5 mg/3mL, 2.5 mg, Nebulization, Q6H PRN, [place], Berta N, APRN, 2.5 mg at 11/20/18 1010    Allergies:  Allergies   Allergen Reactions    Ace Inhibitors Cough    Penicillins Other (See Comments)     Childhood allergy        Objective     Vital Signs:   Vitals:    07/29/25 1214   BP: 130/78   Pulse: 66   Resp: 16   SpO2: 97%   Weight: 49.3 kg (108 lb 9.6 oz)   Height: 152.4 cm (60\")     Body mass index is 21.21 kg/m².    Physical Exam:    Physical Exam  Vitals and nursing note reviewed.   Constitutional:       Appearance: She is well-developed.   HENT:      Head: Normocephalic and atraumatic.   Eyes:      Pupils: Pupils are equal, round, and " reactive to light.   Cardiovascular:      Rate and Rhythm: Normal rate and regular rhythm.      Heart sounds: Normal heart sounds.   Pulmonary:      Effort: Pulmonary effort is normal.      Breath sounds: Normal breath sounds.   Musculoskeletal:      Cervical back: Neck supple.   Skin:     General: Skin is warm and dry.   Neurological:      General: No focal deficit present.      Mental Status: She is alert and oriented to person, place, and time.   Psychiatric:         Mood and Affect: Mood normal.         Behavior: Behavior normal.                 Assessment / Plan     Assessment/Plan:   Problem List Items Addressed This Visit       Moderate single current episode of major depressive disorder    Relevant Medications    melatonin 5 MG tablet tablet    polyethylene glycol (MIRALAX) 17 GM/SCOOP powder    escitalopram (LEXAPRO) 20 MG tablet    amitriptyline (ELAVIL) 10 MG tablet    carBAMazepine XR (TEGretol  XR) 400 MG 12 hr tablet    ALPRAZolam (Xanax) 0.25 MG tablet    buPROPion SR (Wellbutrin SR) 150 MG 12 hr tablet       Assessment & Plan  Depression  - Some improvement with Wellbutrin but slight relapse in symptoms  - Currently taking escitalopram as prescribed, no side effects  - Increase Wellbutrin to 150 mg to manage symptoms more effectively         Discussed plan of care in detail with pt today; pt verb understanding and agrees.    Follow up:  3 months    Electronically signed by PETERSON Nguyen   07/29/2025 12:55 EDT    Patient or patient representative verbalized consent for the use of Ambient Listening during the visit with  PETERSON Nguyen for chart documentation. 7/29/2025  12:56 EDT    Please note that portions of this note were completed with a voice recognition program.

## 2025-07-30 DIAGNOSIS — G50.0 TRIGEMINAL NEURALGIA: ICD-10-CM

## 2025-08-07 DIAGNOSIS — G50.0 TRIGEMINAL NEURALGIA: ICD-10-CM

## 2025-08-07 RX ORDER — AMITRIPTYLINE HYDROCHLORIDE 10 MG/1
10 TABLET ORAL NIGHTLY
Qty: 90 TABLET | Refills: 1 | Status: SHIPPED | OUTPATIENT
Start: 2025-08-07

## 2025-08-07 RX ORDER — PREGABALIN 50 MG/1
50 CAPSULE ORAL 3 TIMES DAILY
Qty: 270 CAPSULE | Refills: 1 | Status: SHIPPED | OUTPATIENT
Start: 2025-08-07

## 2025-08-07 RX ORDER — CARBAMAZEPINE 400 MG/1
400 TABLET, EXTENDED RELEASE ORAL 2 TIMES DAILY
Qty: 180 TABLET | Refills: 1 | Status: SHIPPED | OUTPATIENT
Start: 2025-08-07

## 2025-08-07 RX ORDER — PREGABALIN 50 MG/1
50 CAPSULE ORAL 3 TIMES DAILY
Qty: 270 CAPSULE | OUTPATIENT
Start: 2025-08-07